# Patient Record
Sex: MALE | Race: BLACK OR AFRICAN AMERICAN | Employment: FULL TIME | URBAN - METROPOLITAN AREA
[De-identification: names, ages, dates, MRNs, and addresses within clinical notes are randomized per-mention and may not be internally consistent; named-entity substitution may affect disease eponyms.]

---

## 2017-03-14 ENCOUNTER — TELEPHONE (OUTPATIENT)
Dept: INTERNAL MEDICINE CLINIC | Age: 42
End: 2017-03-14

## 2017-03-14 DIAGNOSIS — Z13.9 SCREENING: Primary | ICD-10-CM

## 2017-04-24 ENCOUNTER — OFFICE VISIT (OUTPATIENT)
Dept: INTERNAL MEDICINE CLINIC | Age: 42
End: 2017-04-24

## 2017-04-24 VITALS
HEART RATE: 65 BPM | DIASTOLIC BLOOD PRESSURE: 78 MMHG | SYSTOLIC BLOOD PRESSURE: 118 MMHG | WEIGHT: 235 LBS | OXYGEN SATURATION: 98 % | BODY MASS INDEX: 27.16 KG/M2

## 2017-04-24 DIAGNOSIS — Z00.00 WELL ADULT EXAM: Primary | ICD-10-CM

## 2017-04-24 DIAGNOSIS — Z13.9 SCREENING: ICD-10-CM

## 2017-04-24 PROCEDURE — 99396 PREV VISIT EST AGE 40-64: CPT | Performed by: INTERNAL MEDICINE

## 2017-04-24 RX ORDER — FLUTICASONE PROPIONATE 50 MCG
2 SPRAY, SUSPENSION (ML) NASAL DAILY
Qty: 1 BOTTLE | Refills: 5 | Status: SHIPPED | OUTPATIENT
Start: 2017-04-24 | End: 2019-01-14

## 2017-05-11 ENCOUNTER — TELEPHONE (OUTPATIENT)
Dept: INTERNAL MEDICINE CLINIC | Age: 42
End: 2017-05-11

## 2017-06-30 PROBLEM — R07.1 CHEST PAIN VARYING WITH BREATHING: Status: ACTIVE | Noted: 2017-06-30

## 2017-06-30 PROBLEM — I26.99 BILATERAL PULMONARY EMBOLISM (HCC): Status: ACTIVE | Noted: 2017-06-30

## 2017-07-13 ENCOUNTER — OFFICE VISIT (OUTPATIENT)
Dept: INTERNAL MEDICINE CLINIC | Age: 42
End: 2017-07-13

## 2017-07-13 VITALS
DIASTOLIC BLOOD PRESSURE: 62 MMHG | SYSTOLIC BLOOD PRESSURE: 106 MMHG | OXYGEN SATURATION: 98 % | WEIGHT: 225 LBS | BODY MASS INDEX: 26 KG/M2 | HEART RATE: 64 BPM

## 2017-07-13 DIAGNOSIS — I26.99 BILATERAL PULMONARY EMBOLISM (HCC): Primary | ICD-10-CM

## 2017-07-13 PROCEDURE — 99213 OFFICE O/P EST LOW 20 MIN: CPT | Performed by: INTERNAL MEDICINE

## 2017-07-13 RX ORDER — OXYCODONE HYDROCHLORIDE AND ACETAMINOPHEN 5; 325 MG/1; MG/1
1 TABLET ORAL EVERY 4 HOURS PRN
COMMUNITY
End: 2017-12-02

## 2017-07-13 ASSESSMENT — ENCOUNTER SYMPTOMS
ANAL BLEEDING: 0
COUGH: 0
CONSTIPATION: 0
CHEST TIGHTNESS: 0
ABDOMINAL PAIN: 0
NAUSEA: 0
SINUS PRESSURE: 0
APNEA: 0
WHEEZING: 0
BLOOD IN STOOL: 0
VOICE CHANGE: 0
PHOTOPHOBIA: 0
RHINORRHEA: 0
DIARRHEA: 0
SORE THROAT: 0
ABDOMINAL DISTENTION: 0
EYE PAIN: 0
VOMITING: 0
BACK PAIN: 0
TROUBLE SWALLOWING: 0
EYE REDNESS: 0
EYE ITCHING: 0
FACIAL SWELLING: 0
COLOR CHANGE: 0
ALLERGIC/IMMUNOLOGIC NEGATIVE: 1
SHORTNESS OF BREATH: 1
STRIDOR: 0
EYE DISCHARGE: 0

## 2017-07-24 ENCOUNTER — TELEPHONE (OUTPATIENT)
Dept: INTERNAL MEDICINE CLINIC | Age: 42
End: 2017-07-24

## 2017-07-24 RX ORDER — TIZANIDINE 4 MG/1
4 TABLET ORAL 3 TIMES DAILY
Qty: 30 TABLET | Refills: 1 | Status: SHIPPED | OUTPATIENT
Start: 2017-07-24 | End: 2017-08-16 | Stop reason: ALTCHOICE

## 2017-07-31 PROBLEM — Z79.01 CHRONIC ANTICOAGULATION: Status: ACTIVE | Noted: 2017-07-31

## 2017-09-28 ENCOUNTER — TELEPHONE (OUTPATIENT)
Dept: INTERNAL MEDICINE CLINIC | Age: 42
End: 2017-09-28

## 2017-10-05 ENCOUNTER — OFFICE VISIT (OUTPATIENT)
Dept: INTERNAL MEDICINE CLINIC | Age: 42
End: 2017-10-05

## 2017-10-05 VITALS
SYSTOLIC BLOOD PRESSURE: 124 MMHG | BODY MASS INDEX: 26.23 KG/M2 | WEIGHT: 227 LBS | HEART RATE: 68 BPM | OXYGEN SATURATION: 98 % | DIASTOLIC BLOOD PRESSURE: 64 MMHG

## 2017-10-05 DIAGNOSIS — I82.532 CHRONIC DEEP VEIN THROMBOSIS (DVT) OF POPLITEAL VEIN OF LEFT LOWER EXTREMITY (HCC): Primary | ICD-10-CM

## 2017-10-05 DIAGNOSIS — G57.92 NEUROPATHY OF LEFT LOWER EXTREMITY: ICD-10-CM

## 2017-10-05 PROCEDURE — 99213 OFFICE O/P EST LOW 20 MIN: CPT | Performed by: INTERNAL MEDICINE

## 2017-10-10 ENCOUNTER — HOSPITAL ENCOUNTER (OUTPATIENT)
Dept: NEUROLOGY | Age: 42
Discharge: HOME | End: 2017-10-10
Attending: INTERNAL MEDICINE | Admitting: INTERNAL MEDICINE

## 2017-10-10 DIAGNOSIS — I82.532 CHRONIC EMBOLISM AND THROMBOSIS OF LEFT POPLITEAL VEIN (HCC): ICD-10-CM

## 2017-10-11 ENCOUNTER — HOSPITAL ENCOUNTER (OUTPATIENT)
Dept: OTHER | Age: 42
Discharge: HOME | End: 2017-10-11
Attending: INTERNAL MEDICINE | Admitting: INTERNAL MEDICINE

## 2017-12-01 ENCOUNTER — TELEPHONE (OUTPATIENT)
Dept: INTERNAL MEDICINE CLINIC | Age: 42
End: 2017-12-01

## 2017-12-01 NOTE — TELEPHONE ENCOUNTER
Patient calling in about mess he left in My Chart for Dr. Wells Double today. He almost went to the ER last night and wondering what he needs to do . Wants appt soon and was wondering if could be fitted in?  Please cb pt

## 2017-12-04 ENCOUNTER — TELEPHONE (OUTPATIENT)
Dept: INTERNAL MEDICINE CLINIC | Age: 42
End: 2017-12-04

## 2017-12-05 NOTE — TELEPHONE ENCOUNTER
Patient received pain medication from ED on 12/2/17. We cannot refill for 20 days based on [facility] 341.

## 2017-12-08 ENCOUNTER — OFFICE VISIT (OUTPATIENT)
Dept: INTERNAL MEDICINE CLINIC | Age: 42
End: 2017-12-08

## 2017-12-08 VITALS
WEIGHT: 227 LBS | DIASTOLIC BLOOD PRESSURE: 68 MMHG | BODY MASS INDEX: 26.23 KG/M2 | HEART RATE: 82 BPM | SYSTOLIC BLOOD PRESSURE: 110 MMHG | OXYGEN SATURATION: 98 %

## 2017-12-08 DIAGNOSIS — N44.2 BENIGN CYST OF BOTH TESTICLES: Primary | ICD-10-CM

## 2017-12-08 PROCEDURE — 99212 OFFICE O/P EST SF 10 MIN: CPT | Performed by: INTERNAL MEDICINE

## 2017-12-08 RX ORDER — OXYCODONE HYDROCHLORIDE AND ACETAMINOPHEN 5; 325 MG/1; MG/1
1 TABLET ORAL EVERY 6 HOURS PRN
Qty: 28 TABLET | Refills: 0 | Status: SHIPPED | OUTPATIENT
Start: 2017-12-08 | End: 2017-12-22

## 2017-12-08 NOTE — PATIENT INSTRUCTIONS
Patient Education        Learning About Diet for Kidney Stone Prevention  What are kidney stones? Kidney stones are made of salts and minerals in the urine that form small \"fred. \" Stones can form in the kidneys and the ureters (the tubes that lead from the kidneys to the bladder). They can also form in the bladder. Stones may not cause a problem as long as they stay in the kidneys. But they can cause sudden, severe pain. Pain is most likely when the stones travel from the kidneys to the bladder. Kidney stones can cause bloody urine. Kidney stones often run in families. You are more likely to get them if you don't drink enough fluids, mainly water. Certain foods and drinks and some dietary supplements may also increase your risk for kidney stones if you consume too much of them. What can you do to prevent kidney stones? Changing what you eat may not prevent all types of kidney stones. But for people who have a history of certain kinds of kidney stones, some changes in diet may help. A dietitian can help you set up a meal plan that includes healthy, low-oxalate choices. Here are some general guidelines to get you started. Plan your meals and snacks around foods that are low in oxalate. These foods include:  · Corn, kale, parsnips, and squash,. · Beef, chicken, pork, turkey, and fish. · Milk, butter, cheese, and yogurt. You can eat certain foods that are medium-high in oxalate, but eat them only once in a while. These foods include:  · Bread. · Brown rice. · English muffins. · Figs. · Popcorn. · String beans. · Tomatoes. Limit very high-oxalate foods, including:  · Black tea. · Coffee. · Chocolate. · Dark green vegetables. · Nuts. Here are some other things you can do to help prevent kidney stones. · Drink plenty of fluids. If you have kidney, heart, or liver disease and have to limit fluids, talk with your doctor before you increase the amount of fluids you drink.   · Do not take more than

## 2017-12-11 NOTE — PROGRESS NOTES
Subjective:      Patient ID: Reyes Joy is a 36 y.o. male. HPI  Patient comes in for f/u of a left testicular cyst. He is scheduled to go  To urology. He would like some pain medication. Review of Systems  Vitals:    12/08/17 1303   BP: 110/68   Pulse: 82   SpO2: 98%   Weight: 227 lb (103 kg)      Wt Readings from Last 3 Encounters:   12/08/17 227 lb (103 kg)   12/02/17 227 lb (103 kg)   10/05/17 227 lb (103 kg)     BP Readings from Last 3 Encounters:   12/08/17 110/68   12/02/17 124/75   10/05/17 124/64     Body mass index is 26.23 kg/m². Facility age limit for growth percentiles is 20 years. Objective:   Physical Exam    Testicular ultrasound: 9 mm testicular cyst    Assessment/Plan:  Tomy was seen today for follow-up from hospital.    Diagnoses and all orders for this visit:    Benign cyst of both testicles  -     oxyCODONE-acetaminophen (PERCOCET) 5-325 MG per tablet; Take 1 tablet by mouth every 6 hours as needed for Pain  Testicular cyst.      Return in about 1 month (around 1/8/2018).      Total time 10 minutes with > 50% counseling

## 2018-01-31 ENCOUNTER — TELEPHONE (OUTPATIENT)
Dept: INTERNAL MEDICINE CLINIC | Age: 43
End: 2018-01-31

## 2018-01-31 NOTE — LETTER
[address] [facility]  [address] [address]  Phone: [phone]  Fax: [phone]    Tigre Campbell MD        January 31, 2018     Patient: Jeffrey Burgess   YOB: 1975   Date of Visit: 1/31/2018       To Whom It May Concern:    Tomy Perez is under my care for bilateral testicular cysts. These are causing him significant pain and impair his ability to work. I recommend that he remain out of work until 2/15/18. By that time he will have seen his urologist for this. If you have any questions or concerns, please don't hesitate to call.     Sincerely,        Vanita Monroy MD, MSc, [address], [address] [place] [address] Internal Medicine-Pediatrics  [address] [address].

## 2018-04-16 ENCOUNTER — OFFICE VISIT (OUTPATIENT)
Dept: INTERNAL MEDICINE CLINIC | Age: 43
End: 2018-04-16

## 2018-04-16 VITALS
DIASTOLIC BLOOD PRESSURE: 78 MMHG | SYSTOLIC BLOOD PRESSURE: 126 MMHG | OXYGEN SATURATION: 98 % | HEART RATE: 64 BPM | WEIGHT: 234 LBS | BODY MASS INDEX: 27.04 KG/M2

## 2018-04-16 DIAGNOSIS — R41.840 INATTENTION: Primary | ICD-10-CM

## 2018-04-16 PROBLEM — Z79.01 CHRONIC ANTICOAGULATION: Status: RESOLVED | Noted: 2017-07-31 | Resolved: 2018-04-16

## 2018-04-16 PROCEDURE — 99213 OFFICE O/P EST LOW 20 MIN: CPT | Performed by: INTERNAL MEDICINE

## 2018-04-16 PROCEDURE — G8417 CALC BMI ABV UP PARAM F/U: HCPCS | Performed by: INTERNAL MEDICINE

## 2018-04-16 PROCEDURE — 1036F TOBACCO NON-USER: CPT | Performed by: INTERNAL MEDICINE

## 2018-04-16 PROCEDURE — G8427 DOCREV CUR MEDS BY ELIG CLIN: HCPCS | Performed by: INTERNAL MEDICINE

## 2018-05-01 ENCOUNTER — OFFICE VISIT (OUTPATIENT)
Dept: PSYCHOLOGY | Age: 43
End: 2018-05-01

## 2018-05-01 DIAGNOSIS — F33.1 MODERATE EPISODE OF RECURRENT MAJOR DEPRESSIVE DISORDER (HCC): Primary | ICD-10-CM

## 2018-05-01 DIAGNOSIS — F41.1 GAD (GENERALIZED ANXIETY DISORDER): ICD-10-CM

## 2018-05-01 PROCEDURE — 90791 PSYCH DIAGNOSTIC EVALUATION: CPT | Performed by: PSYCHOLOGIST

## 2018-05-01 ASSESSMENT — ANXIETY QUESTIONNAIRES
5. BEING SO RESTLESS THAT IT IS HARD TO SIT STILL: 1-SEVERAL DAYS
1. FEELING NERVOUS, ANXIOUS, OR ON EDGE: 2-OVER HALF THE DAYS
4. TROUBLE RELAXING: 3-NEARLY EVERY DAY
7. FEELING AFRAID AS IF SOMETHING AWFUL MIGHT HAPPEN: 0-NOT AT ALL SURE
2. NOT BEING ABLE TO STOP OR CONTROL WORRYING: 1-SEVERAL DAYS
GAD7 TOTAL SCORE: 12
3. WORRYING TOO MUCH ABOUT DIFFERENT THINGS: 2-OVER HALF THE DAYS
6. BECOMING EASILY ANNOYED OR IRRITABLE: 3-NEARLY EVERY DAY

## 2018-05-01 ASSESSMENT — PATIENT HEALTH QUESTIONNAIRE - PHQ9
6. FEELING BAD ABOUT YOURSELF - OR THAT YOU ARE A FAILURE OR HAVE LET YOURSELF OR YOUR FAMILY DOWN: 3
SUM OF ALL RESPONSES TO PHQ9 QUESTIONS 1 & 2: 3
2. FEELING DOWN, DEPRESSED OR HOPELESS: 1
1. LITTLE INTEREST OR PLEASURE IN DOING THINGS: 2
9. THOUGHTS THAT YOU WOULD BE BETTER OFF DEAD, OR OF HURTING YOURSELF: 1
8. MOVING OR SPEAKING SO SLOWLY THAT OTHER PEOPLE COULD HAVE NOTICED. OR THE OPPOSITE, BEING SO FIGETY OR RESTLESS THAT YOU HAVE BEEN MOVING AROUND A LOT MORE THAN USUAL: 2
10. IF YOU CHECKED OFF ANY PROBLEMS, HOW DIFFICULT HAVE THESE PROBLEMS MADE IT FOR YOU TO DO YOUR WORK, TAKE CARE OF THINGS AT HOME, OR GET ALONG WITH OTHER PEOPLE: 2
4. FEELING TIRED OR HAVING LITTLE ENERGY: 1
SUM OF ALL RESPONSES TO PHQ QUESTIONS 1-9: 18
3. TROUBLE FALLING OR STAYING ASLEEP: 3
5. POOR APPETITE OR OVEREATING: 3
7. TROUBLE CONCENTRATING ON THINGS, SUCH AS READING THE NEWSPAPER OR WATCHING TELEVISION: 2

## 2018-05-30 ENCOUNTER — TELEPHONE (OUTPATIENT)
Dept: INTERNAL MEDICINE CLINIC | Age: 43
End: 2018-05-30

## 2018-06-08 ENCOUNTER — OFFICE VISIT (OUTPATIENT)
Dept: INTERNAL MEDICINE CLINIC | Age: 43
End: 2018-06-08

## 2018-06-08 VITALS
SYSTOLIC BLOOD PRESSURE: 100 MMHG | OXYGEN SATURATION: 98 % | BODY MASS INDEX: 26 KG/M2 | DIASTOLIC BLOOD PRESSURE: 70 MMHG | WEIGHT: 225 LBS | HEART RATE: 72 BPM

## 2018-06-08 DIAGNOSIS — I26.99 PULMONARY INFARCT (HCC): Primary | ICD-10-CM

## 2018-06-08 DIAGNOSIS — I26.99 BILATERAL PULMONARY EMBOLISM (HCC): ICD-10-CM

## 2018-06-08 PROCEDURE — G8417 CALC BMI ABV UP PARAM F/U: HCPCS | Performed by: INTERNAL MEDICINE

## 2018-06-08 PROCEDURE — 1036F TOBACCO NON-USER: CPT | Performed by: INTERNAL MEDICINE

## 2018-06-08 PROCEDURE — G8427 DOCREV CUR MEDS BY ELIG CLIN: HCPCS | Performed by: INTERNAL MEDICINE

## 2018-06-08 PROCEDURE — 99214 OFFICE O/P EST MOD 30 MIN: CPT | Performed by: INTERNAL MEDICINE

## 2018-06-08 RX ORDER — METHOCARBAMOL 750 MG/1
750 TABLET, FILM COATED ORAL 4 TIMES DAILY
COMMUNITY
End: 2018-06-10 | Stop reason: SDUPTHER

## 2018-06-08 RX ORDER — PREDNISONE 20 MG/1
20 TABLET ORAL DAILY
COMMUNITY
End: 2019-01-14 | Stop reason: SINTOL

## 2018-06-08 RX ORDER — OXYCODONE HYDROCHLORIDE AND ACETAMINOPHEN 5; 325 MG/1; MG/1
1 TABLET ORAL EVERY 8 HOURS PRN
COMMUNITY
End: 2018-06-08 | Stop reason: SDUPTHER

## 2018-06-08 RX ORDER — OXYCODONE HYDROCHLORIDE AND ACETAMINOPHEN 5; 325 MG/1; MG/1
1 TABLET ORAL EVERY 12 HOURS PRN
Qty: 60 TABLET | Refills: 0 | Status: SHIPPED | OUTPATIENT
Start: 2018-06-08 | End: 2018-07-10 | Stop reason: SDUPTHER

## 2018-06-09 ENCOUNTER — PATIENT MESSAGE (OUTPATIENT)
Dept: INTERNAL MEDICINE CLINIC | Age: 43
End: 2018-06-09

## 2018-06-10 ENCOUNTER — PATIENT MESSAGE (OUTPATIENT)
Dept: INTERNAL MEDICINE CLINIC | Age: 43
End: 2018-06-10

## 2018-06-10 RX ORDER — METHOCARBAMOL 750 MG/1
750 TABLET, FILM COATED ORAL 4 TIMES DAILY PRN
Qty: 90 TABLET | Refills: 2 | Status: SHIPPED | OUTPATIENT
Start: 2018-06-10 | End: 2019-01-14 | Stop reason: SINTOL

## 2018-06-10 ASSESSMENT — ENCOUNTER SYMPTOMS
CHOKING: 0
SHORTNESS OF BREATH: 0
CHEST TIGHTNESS: 1
WHEEZING: 0

## 2018-06-12 ENCOUNTER — PATIENT MESSAGE (OUTPATIENT)
Dept: INTERNAL MEDICINE CLINIC | Age: 43
End: 2018-06-12

## 2018-06-13 ENCOUNTER — OFFICE VISIT (OUTPATIENT)
Dept: INTERNAL MEDICINE CLINIC | Age: 43
End: 2018-06-13

## 2018-06-13 VITALS
OXYGEN SATURATION: 97 % | SYSTOLIC BLOOD PRESSURE: 120 MMHG | WEIGHT: 228 LBS | DIASTOLIC BLOOD PRESSURE: 80 MMHG | BODY MASS INDEX: 26.35 KG/M2 | HEART RATE: 64 BPM

## 2018-06-13 DIAGNOSIS — I26.99 PULMONARY INFARCT (HCC): ICD-10-CM

## 2018-06-13 DIAGNOSIS — F32.1 MODERATE SINGLE CURRENT EPISODE OF MAJOR DEPRESSIVE DISORDER (HCC): Primary | ICD-10-CM

## 2018-06-13 PROCEDURE — G8427 DOCREV CUR MEDS BY ELIG CLIN: HCPCS | Performed by: INTERNAL MEDICINE

## 2018-06-13 PROCEDURE — 99214 OFFICE O/P EST MOD 30 MIN: CPT | Performed by: INTERNAL MEDICINE

## 2018-06-13 PROCEDURE — G8417 CALC BMI ABV UP PARAM F/U: HCPCS | Performed by: INTERNAL MEDICINE

## 2018-06-13 PROCEDURE — 1036F TOBACCO NON-USER: CPT | Performed by: INTERNAL MEDICINE

## 2018-06-13 RX ORDER — CITALOPRAM 20 MG/1
20 TABLET ORAL DAILY
Qty: 30 TABLET | Refills: 5 | Status: SHIPPED | OUTPATIENT
Start: 2018-06-13 | End: 2019-02-10 | Stop reason: SDUPTHER

## 2018-06-13 ASSESSMENT — PATIENT HEALTH QUESTIONNAIRE - PHQ9
6. FEELING BAD ABOUT YOURSELF - OR THAT YOU ARE A FAILURE OR HAVE LET YOURSELF OR YOUR FAMILY DOWN: 1
5. POOR APPETITE OR OVEREATING: 2
3. TROUBLE FALLING OR STAYING ASLEEP: 3
8. MOVING OR SPEAKING SO SLOWLY THAT OTHER PEOPLE COULD HAVE NOTICED. OR THE OPPOSITE, BEING SO FIGETY OR RESTLESS THAT YOU HAVE BEEN MOVING AROUND A LOT MORE THAN USUAL: 1
1. LITTLE INTEREST OR PLEASURE IN DOING THINGS: 2
7. TROUBLE CONCENTRATING ON THINGS, SUCH AS READING THE NEWSPAPER OR WATCHING TELEVISION: 1
SUM OF ALL RESPONSES TO PHQ9 QUESTIONS 1 & 2: 3
2. FEELING DOWN, DEPRESSED OR HOPELESS: 1
10. IF YOU CHECKED OFF ANY PROBLEMS, HOW DIFFICULT HAVE THESE PROBLEMS MADE IT FOR YOU TO DO YOUR WORK, TAKE CARE OF THINGS AT HOME, OR GET ALONG WITH OTHER PEOPLE: 1
4. FEELING TIRED OR HAVING LITTLE ENERGY: 1
SUM OF ALL RESPONSES TO PHQ QUESTIONS 1-9: 13
9. THOUGHTS THAT YOU WOULD BE BETTER OFF DEAD, OR OF HURTING YOURSELF: 1

## 2018-06-14 ENCOUNTER — HOSPITAL ENCOUNTER (OUTPATIENT)
Dept: OTHER | Age: 43
Discharge: HOME | End: 2018-06-14
Attending: INTERNAL MEDICINE | Admitting: INTERNAL MEDICINE

## 2018-06-14 ENCOUNTER — OFFICE VISIT (OUTPATIENT)
Dept: PULMONOLOGY | Age: 43
End: 2018-06-14

## 2018-06-14 VITALS
SYSTOLIC BLOOD PRESSURE: 110 MMHG | HEIGHT: 78 IN | BODY MASS INDEX: 26.27 KG/M2 | RESPIRATION RATE: 18 BRPM | OXYGEN SATURATION: 97 % | WEIGHT: 227 LBS | DIASTOLIC BLOOD PRESSURE: 70 MMHG | HEART RATE: 70 BPM

## 2018-06-14 DIAGNOSIS — R06.02 SHORTNESS OF BREATH: ICD-10-CM

## 2018-06-14 DIAGNOSIS — I26.99 PULMONARY EMBOLISM WITH INFARCTION (HCC): Primary | ICD-10-CM

## 2018-06-14 DIAGNOSIS — R07.81 PLEURITIC CHEST PAIN: ICD-10-CM

## 2018-06-14 DIAGNOSIS — I26.99 PULMONARY EMBOLISM WITH INFARCTION (HCC): ICD-10-CM

## 2018-06-14 PROCEDURE — 99244 OFF/OP CNSLTJ NEW/EST MOD 40: CPT | Performed by: INTERNAL MEDICINE

## 2018-06-14 PROCEDURE — G8417 CALC BMI ABV UP PARAM F/U: HCPCS | Performed by: INTERNAL MEDICINE

## 2018-06-14 PROCEDURE — G8427 DOCREV CUR MEDS BY ELIG CLIN: HCPCS | Performed by: INTERNAL MEDICINE

## 2018-06-15 LAB
ANA INTERPRETATION: ABNORMAL
ANA TITER: ABNORMAL
ANTI-NUCLEAR ANTIBODY (ANA): POSITIVE
ENA TO RNP ANTIBODY: NEGATIVE EU
ENA TO SMITH (SM) ANTIBODY: NEGATIVE EU
ENA TO SSA (RO) ANTIBODY: NEGATIVE EU
ENA TO SSB (LA) ANTIBODY: NEGATIVE EU

## 2018-06-16 LAB
ANCA IFA: NORMAL
DOUBLE STRANDED DNA AB, IGG: NORMAL
SCLERODERMA (SCL-70) AB: 1 AU/ML (ref 0–40)

## 2018-06-22 ENCOUNTER — TELEPHONE (OUTPATIENT)
Dept: INTERNAL MEDICINE CLINIC | Age: 43
End: 2018-06-22

## 2018-06-25 ENCOUNTER — HOSPITAL ENCOUNTER (OUTPATIENT)
Dept: NON INVASIVE DIAGNOSTICS | Age: 43
Discharge: HOME | End: 2018-06-25
Attending: INTERNAL MEDICINE | Admitting: INTERNAL MEDICINE

## 2018-06-25 DIAGNOSIS — R06.02 SHORTNESS OF BREATH: ICD-10-CM

## 2018-06-25 LAB
LV EF: 53 %
LVEF MODALITY: NORMAL

## 2018-07-10 ENCOUNTER — OFFICE VISIT (OUTPATIENT)
Dept: INTERNAL MEDICINE CLINIC | Age: 43
End: 2018-07-10

## 2018-07-10 VITALS
HEART RATE: 65 BPM | SYSTOLIC BLOOD PRESSURE: 110 MMHG | OXYGEN SATURATION: 96 % | BODY MASS INDEX: 26.46 KG/M2 | WEIGHT: 229 LBS | DIASTOLIC BLOOD PRESSURE: 70 MMHG

## 2018-07-10 DIAGNOSIS — I26.99 PULMONARY INFARCT (HCC): ICD-10-CM

## 2018-07-10 DIAGNOSIS — F33.1 MODERATE EPISODE OF RECURRENT MAJOR DEPRESSIVE DISORDER (HCC): Primary | ICD-10-CM

## 2018-07-10 PROCEDURE — 99213 OFFICE O/P EST LOW 20 MIN: CPT | Performed by: INTERNAL MEDICINE

## 2018-07-10 PROCEDURE — G8427 DOCREV CUR MEDS BY ELIG CLIN: HCPCS | Performed by: INTERNAL MEDICINE

## 2018-07-10 PROCEDURE — 1036F TOBACCO NON-USER: CPT | Performed by: INTERNAL MEDICINE

## 2018-07-10 PROCEDURE — G8417 CALC BMI ABV UP PARAM F/U: HCPCS | Performed by: INTERNAL MEDICINE

## 2018-07-10 RX ORDER — OXYCODONE HYDROCHLORIDE AND ACETAMINOPHEN 5; 325 MG/1; MG/1
1 TABLET ORAL EVERY 12 HOURS PRN
Qty: 60 TABLET | Refills: 0 | Status: SHIPPED | OUTPATIENT
Start: 2018-07-10 | End: 2018-08-09

## 2018-07-10 RX ORDER — ARIPIPRAZOLE 5 MG/1
5 TABLET ORAL DAILY
Qty: 30 TABLET | Refills: 3 | Status: SHIPPED | OUTPATIENT
Start: 2018-07-10 | End: 2019-01-14 | Stop reason: SINTOL

## 2018-07-10 ASSESSMENT — PATIENT HEALTH QUESTIONNAIRE - PHQ9
2. FEELING DOWN, DEPRESSED OR HOPELESS: 1
1. LITTLE INTEREST OR PLEASURE IN DOING THINGS: 1
3. TROUBLE FALLING OR STAYING ASLEEP: 3
6. FEELING BAD ABOUT YOURSELF - OR THAT YOU ARE A FAILURE OR HAVE LET YOURSELF OR YOUR FAMILY DOWN: 2
8. MOVING OR SPEAKING SO SLOWLY THAT OTHER PEOPLE COULD HAVE NOTICED. OR THE OPPOSITE, BEING SO FIGETY OR RESTLESS THAT YOU HAVE BEEN MOVING AROUND A LOT MORE THAN USUAL: 0
4. FEELING TIRED OR HAVING LITTLE ENERGY: 2
5. POOR APPETITE OR OVEREATING: 1
9. THOUGHTS THAT YOU WOULD BE BETTER OFF DEAD, OR OF HURTING YOURSELF: 1
SUM OF ALL RESPONSES TO PHQ9 QUESTIONS 1 & 2: 2
SUM OF ALL RESPONSES TO PHQ QUESTIONS 1-9: 12
10. IF YOU CHECKED OFF ANY PROBLEMS, HOW DIFFICULT HAVE THESE PROBLEMS MADE IT FOR YOU TO DO YOUR WORK, TAKE CARE OF THINGS AT HOME, OR GET ALONG WITH OTHER PEOPLE: 1
7. TROUBLE CONCENTRATING ON THINGS, SUCH AS READING THE NEWSPAPER OR WATCHING TELEVISION: 1

## 2019-01-14 ENCOUNTER — OFFICE VISIT (OUTPATIENT)
Dept: INTERNAL MEDICINE CLINIC | Age: 44
End: 2019-01-14
Payer: COMMERCIAL

## 2019-01-14 VITALS
HEART RATE: 81 BPM | DIASTOLIC BLOOD PRESSURE: 70 MMHG | SYSTOLIC BLOOD PRESSURE: 128 MMHG | BODY MASS INDEX: 28.2 KG/M2 | WEIGHT: 244 LBS | OXYGEN SATURATION: 96 %

## 2019-01-14 DIAGNOSIS — R06.02 SHORTNESS OF BREATH: Primary | ICD-10-CM

## 2019-01-14 DIAGNOSIS — F51.01 PRIMARY INSOMNIA: ICD-10-CM

## 2019-01-14 DIAGNOSIS — I26.99 PULMONARY INFARCT (HCC): ICD-10-CM

## 2019-01-14 PROCEDURE — 99214 OFFICE O/P EST MOD 30 MIN: CPT | Performed by: INTERNAL MEDICINE

## 2019-01-14 PROCEDURE — G8417 CALC BMI ABV UP PARAM F/U: HCPCS | Performed by: INTERNAL MEDICINE

## 2019-01-14 PROCEDURE — G8484 FLU IMMUNIZE NO ADMIN: HCPCS | Performed by: INTERNAL MEDICINE

## 2019-01-14 PROCEDURE — 1036F TOBACCO NON-USER: CPT | Performed by: INTERNAL MEDICINE

## 2019-01-14 PROCEDURE — G8427 DOCREV CUR MEDS BY ELIG CLIN: HCPCS | Performed by: INTERNAL MEDICINE

## 2019-01-14 RX ORDER — VALACYCLOVIR HYDROCHLORIDE 1 G/1
1000 TABLET, FILM COATED ORAL DAILY
Qty: 30 TABLET | Refills: 11 | Status: SHIPPED | OUTPATIENT
Start: 2019-01-14 | End: 2020-01-20

## 2019-01-14 RX ORDER — CIPROFLOXACIN AND DEXAMETHASONE 3; 1 MG/ML; MG/ML
4 SUSPENSION/ DROPS AURICULAR (OTIC) 2 TIMES DAILY
Qty: 7.5 ML | Refills: 0 | Status: SHIPPED | OUTPATIENT
Start: 2019-01-14 | End: 2019-01-21

## 2019-01-16 ASSESSMENT — ENCOUNTER SYMPTOMS
WHEEZING: 1
SHORTNESS OF BREATH: 1
EYE PAIN: 0

## 2019-01-22 ENCOUNTER — HOSPITAL ENCOUNTER (OUTPATIENT)
Dept: PULMONOLOGY | Age: 44
Discharge: HOME | End: 2019-01-22
Payer: COMMERCIAL

## 2019-01-22 PROCEDURE — 94761 N-INVAS EAR/PLS OXIMETRY MLT: CPT

## 2019-01-22 PROCEDURE — 94664 DEMO&/EVAL PT USE INHALER: CPT

## 2019-01-22 PROCEDURE — 94729 DIFFUSING CAPACITY: CPT

## 2019-01-22 PROCEDURE — 6360000002 HC RX W HCPCS: Performed by: INTERNAL MEDICINE

## 2019-01-22 PROCEDURE — 94640 AIRWAY INHALATION TREATMENT: CPT

## 2019-01-22 PROCEDURE — 94726 PLETHYSMOGRAPHY LUNG VOLUMES: CPT

## 2019-01-22 PROCEDURE — 94060 EVALUATION OF WHEEZING: CPT

## 2019-01-22 RX ORDER — ALBUTEROL SULFATE 2.5 MG/3ML
2.5 SOLUTION RESPIRATORY (INHALATION) ONCE
Status: COMPLETED | OUTPATIENT
Start: 2019-01-22 | End: 2019-01-22

## 2019-01-22 RX ADMIN — ALBUTEROL SULFATE 2.5 MG: 2.5 SOLUTION RESPIRATORY (INHALATION) at 16:16

## 2019-02-04 ENCOUNTER — OFFICE VISIT (OUTPATIENT)
Dept: INTERNAL MEDICINE CLINIC | Age: 44
End: 2019-02-04
Payer: COMMERCIAL

## 2019-02-04 VITALS
SYSTOLIC BLOOD PRESSURE: 118 MMHG | BODY MASS INDEX: 28.54 KG/M2 | WEIGHT: 247 LBS | HEART RATE: 83 BPM | OXYGEN SATURATION: 96 % | DIASTOLIC BLOOD PRESSURE: 70 MMHG

## 2019-02-04 DIAGNOSIS — J45.32 MILD PERSISTENT ASTHMA WITH STATUS ASTHMATICUS: Primary | ICD-10-CM

## 2019-02-04 PROCEDURE — G8484 FLU IMMUNIZE NO ADMIN: HCPCS | Performed by: INTERNAL MEDICINE

## 2019-02-04 PROCEDURE — G8427 DOCREV CUR MEDS BY ELIG CLIN: HCPCS | Performed by: INTERNAL MEDICINE

## 2019-02-04 PROCEDURE — 1036F TOBACCO NON-USER: CPT | Performed by: INTERNAL MEDICINE

## 2019-02-04 PROCEDURE — 99214 OFFICE O/P EST MOD 30 MIN: CPT | Performed by: INTERNAL MEDICINE

## 2019-02-04 PROCEDURE — G8417 CALC BMI ABV UP PARAM F/U: HCPCS | Performed by: INTERNAL MEDICINE

## 2019-02-04 RX ORDER — ALBUTEROL SULFATE 90 UG/1
2 AEROSOL, METERED RESPIRATORY (INHALATION) EVERY 6 HOURS PRN
Qty: 1 INHALER | Refills: 3 | Status: SHIPPED | OUTPATIENT
Start: 2019-02-04 | End: 2020-10-29

## 2019-02-04 RX ORDER — FLUTICASONE FUROATE AND VILANTEROL 100; 25 UG/1; UG/1
1 POWDER RESPIRATORY (INHALATION) DAILY
Qty: 60 EACH | Refills: 5 | Status: SHIPPED | OUTPATIENT
Start: 2019-02-04 | End: 2019-04-03 | Stop reason: SDUPTHER

## 2019-02-04 RX ORDER — FLUTICASONE FUROATE AND VILANTEROL 100; 25 UG/1; UG/1
1 POWDER RESPIRATORY (INHALATION) DAILY
Qty: 14 EACH | Refills: 0 | COMMUNITY
Start: 2019-02-04 | End: 2019-04-03

## 2019-02-05 ASSESSMENT — ENCOUNTER SYMPTOMS
WHEEZING: 1
SHORTNESS OF BREATH: 1

## 2019-02-10 DIAGNOSIS — F32.1 MODERATE SINGLE CURRENT EPISODE OF MAJOR DEPRESSIVE DISORDER (HCC): ICD-10-CM

## 2019-02-11 RX ORDER — APIXABAN 5 MG/1
TABLET, FILM COATED ORAL
Qty: 60 TABLET | Refills: 0 | Status: SHIPPED | OUTPATIENT
Start: 2019-02-11 | End: 2019-04-06 | Stop reason: SDUPTHER

## 2019-02-11 RX ORDER — CITALOPRAM 20 MG/1
20 TABLET ORAL DAILY
Qty: 30 TABLET | Refills: 0 | Status: SHIPPED | OUTPATIENT
Start: 2019-02-11 | End: 2019-04-06 | Stop reason: SDUPTHER

## 2019-02-25 ENCOUNTER — APPOINTMENT (OUTPATIENT)
Dept: CT IMAGING | Age: 44
End: 2019-02-25
Payer: COMMERCIAL

## 2019-02-25 ENCOUNTER — HOSPITAL ENCOUNTER (EMERGENCY)
Age: 44
Discharge: HOME | End: 2019-02-26
Attending: EMERGENCY MEDICINE
Payer: COMMERCIAL

## 2019-02-25 ENCOUNTER — APPOINTMENT (OUTPATIENT)
Dept: GENERAL RADIOLOGY | Age: 44
End: 2019-02-25
Payer: COMMERCIAL

## 2019-02-25 VITALS
RESPIRATION RATE: 20 BRPM | SYSTOLIC BLOOD PRESSURE: 131 MMHG | HEART RATE: 56 BPM | BODY MASS INDEX: 28.69 KG/M2 | TEMPERATURE: 98.4 F | OXYGEN SATURATION: 97 % | WEIGHT: 248 LBS | DIASTOLIC BLOOD PRESSURE: 87 MMHG | HEIGHT: 78 IN

## 2019-02-25 DIAGNOSIS — J18.9 PNEUMONIA DUE TO ORGANISM: ICD-10-CM

## 2019-02-25 DIAGNOSIS — Z79.01 ON CONTINUOUS ORAL ANTICOAGULATION: ICD-10-CM

## 2019-02-25 DIAGNOSIS — R06.02 SHORTNESS OF BREATH: Primary | ICD-10-CM

## 2019-02-25 DIAGNOSIS — Z86.711 HX OF PULMONARY EMBOLUS: ICD-10-CM

## 2019-02-25 LAB
A/G RATIO: 1.4 (ref 1.1–2.2)
ALBUMIN SERPL-MCNC: 4.3 G/DL (ref 3.4–5)
ALP BLD-CCNC: 54 U/L (ref 40–129)
ALT SERPL-CCNC: 26 U/L (ref 10–40)
ANION GAP SERPL CALCULATED.3IONS-SCNC: 11 MMOL/L (ref 3–16)
AST SERPL-CCNC: 30 U/L (ref 15–37)
BASOPHILS ABSOLUTE: 0 K/UL (ref 0–0.2)
BASOPHILS RELATIVE PERCENT: 1.1 %
BILIRUB SERPL-MCNC: 0.3 MG/DL (ref 0–1)
BUN BLDV-MCNC: 14 MG/DL (ref 7–20)
CALCIUM SERPL-MCNC: 9.4 MG/DL (ref 8.3–10.6)
CHLORIDE BLD-SCNC: 100 MMOL/L (ref 99–110)
CO2: 28 MMOL/L (ref 21–32)
CREAT SERPL-MCNC: 1.2 MG/DL (ref 0.9–1.3)
D DIMER: 389 NG/ML DDU (ref 0–229)
EOSINOPHILS ABSOLUTE: 0.1 K/UL (ref 0–0.6)
EOSINOPHILS RELATIVE PERCENT: 2.6 %
GFR AFRICAN AMERICAN: >60
GFR NON-AFRICAN AMERICAN: >60
GLOBULIN: 3.1 G/DL
GLUCOSE BLD-MCNC: 135 MG/DL (ref 70–99)
HCT VFR BLD CALC: 44 % (ref 40.5–52.5)
HEMOGLOBIN: 14.9 G/DL (ref 13.5–17.5)
LYMPHOCYTES ABSOLUTE: 1.9 K/UL (ref 1–5.1)
LYMPHOCYTES RELATIVE PERCENT: 51.4 %
MCH RBC QN AUTO: 29.6 PG (ref 26–34)
MCHC RBC AUTO-ENTMCNC: 33.9 G/DL (ref 31–36)
MCV RBC AUTO: 87.4 FL (ref 80–100)
MONOCYTES ABSOLUTE: 0.3 K/UL (ref 0–1.3)
MONOCYTES RELATIVE PERCENT: 6.7 %
NEUTROPHILS ABSOLUTE: 1.4 K/UL (ref 1.7–7.7)
NEUTROPHILS RELATIVE PERCENT: 38.2 %
PDW BLD-RTO: 14.1 % (ref 12.4–15.4)
PLATELET # BLD: 132 K/UL (ref 135–450)
PMV BLD AUTO: 8.5 FL (ref 5–10.5)
POTASSIUM SERPL-SCNC: 3.9 MMOL/L (ref 3.5–5.1)
RAPID INFLUENZA  B AGN: NEGATIVE
RAPID INFLUENZA A AGN: NEGATIVE
RBC # BLD: 5.03 M/UL (ref 4.2–5.9)
SODIUM BLD-SCNC: 139 MMOL/L (ref 136–145)
TOTAL PROTEIN: 7.4 G/DL (ref 6.4–8.2)
TROPONIN: <0.01 NG/ML
TROPONIN: <0.01 NG/ML
WBC # BLD: 3.7 K/UL (ref 4–11)

## 2019-02-25 PROCEDURE — 87804 INFLUENZA ASSAY W/OPTIC: CPT

## 2019-02-25 PROCEDURE — 85379 FIBRIN DEGRADATION QUANT: CPT

## 2019-02-25 PROCEDURE — 71046 X-RAY EXAM CHEST 2 VIEWS: CPT

## 2019-02-25 PROCEDURE — 93005 ELECTROCARDIOGRAM TRACING: CPT | Performed by: EMERGENCY MEDICINE

## 2019-02-25 PROCEDURE — 6370000000 HC RX 637 (ALT 250 FOR IP): Performed by: EMERGENCY MEDICINE

## 2019-02-25 PROCEDURE — 6360000004 HC RX CONTRAST MEDICATION: Performed by: EMERGENCY MEDICINE

## 2019-02-25 PROCEDURE — 84484 ASSAY OF TROPONIN QUANT: CPT

## 2019-02-25 PROCEDURE — 85025 COMPLETE CBC W/AUTO DIFF WBC: CPT

## 2019-02-25 PROCEDURE — 80053 COMPREHEN METABOLIC PANEL: CPT

## 2019-02-25 PROCEDURE — 99284 EMERGENCY DEPT VISIT MOD MDM: CPT

## 2019-02-25 PROCEDURE — 71275 CT ANGIOGRAPHY CHEST: CPT

## 2019-02-25 PROCEDURE — 36415 COLL VENOUS BLD VENIPUNCTURE: CPT

## 2019-02-25 RX ORDER — AMOXICILLIN AND CLAVULANATE POTASSIUM 875; 125 MG/1; MG/1
1 TABLET, FILM COATED ORAL 2 TIMES DAILY
Qty: 20 TABLET | Refills: 0 | Status: SHIPPED | OUTPATIENT
Start: 2019-02-25 | End: 2019-03-07

## 2019-02-25 RX ORDER — AMOXICILLIN AND CLAVULANATE POTASSIUM 875; 125 MG/1; MG/1
1 TABLET, FILM COATED ORAL ONCE
Status: COMPLETED | OUTPATIENT
Start: 2019-02-25 | End: 2019-02-25

## 2019-02-25 RX ADMIN — AMOXICILLIN AND CLAVULANATE POTASSIUM 1 TABLET: 875; 125 TABLET, FILM COATED ORAL at 23:31

## 2019-02-25 RX ADMIN — IOPAMIDOL 80 ML: 755 INJECTION, SOLUTION INTRAVENOUS at 21:32

## 2019-02-25 ASSESSMENT — PAIN DESCRIPTION - PAIN TYPE
TYPE: ACUTE PAIN
TYPE: ACUTE PAIN

## 2019-02-25 ASSESSMENT — PAIN DESCRIPTION - DESCRIPTORS: DESCRIPTORS: PRESSURE

## 2019-02-25 ASSESSMENT — PAIN SCALES - GENERAL
PAINLEVEL_OUTOF10: 8
PAINLEVEL_OUTOF10: 5

## 2019-02-25 ASSESSMENT — PAIN DESCRIPTION - ORIENTATION
ORIENTATION: RIGHT
ORIENTATION: MID

## 2019-02-25 ASSESSMENT — PAIN DESCRIPTION - LOCATION
LOCATION: CHEST
LOCATION: HAND

## 2019-02-26 LAB
EKG ATRIAL RATE: 74 BPM
EKG DIAGNOSIS: NORMAL
EKG P AXIS: 55 DEGREES
EKG P-R INTERVAL: 150 MS
EKG Q-T INTERVAL: 356 MS
EKG QRS DURATION: 82 MS
EKG QTC CALCULATION (BAZETT): 395 MS
EKG R AXIS: 14 DEGREES
EKG T AXIS: 53 DEGREES
EKG VENTRICULAR RATE: 74 BPM

## 2019-02-26 PROCEDURE — 93010 ELECTROCARDIOGRAM REPORT: CPT | Performed by: INTERNAL MEDICINE

## 2019-04-03 ENCOUNTER — OFFICE VISIT (OUTPATIENT)
Dept: INTERNAL MEDICINE CLINIC | Age: 44
End: 2019-04-03
Payer: COMMERCIAL

## 2019-04-03 VITALS
DIASTOLIC BLOOD PRESSURE: 68 MMHG | OXYGEN SATURATION: 96 % | WEIGHT: 250 LBS | BODY MASS INDEX: 28.89 KG/M2 | HEART RATE: 61 BPM | SYSTOLIC BLOOD PRESSURE: 108 MMHG

## 2019-04-03 DIAGNOSIS — J45.32 MILD PERSISTENT ASTHMA WITH STATUS ASTHMATICUS: ICD-10-CM

## 2019-04-03 PROCEDURE — 1036F TOBACCO NON-USER: CPT | Performed by: INTERNAL MEDICINE

## 2019-04-03 PROCEDURE — 99214 OFFICE O/P EST MOD 30 MIN: CPT | Performed by: INTERNAL MEDICINE

## 2019-04-03 PROCEDURE — G8427 DOCREV CUR MEDS BY ELIG CLIN: HCPCS | Performed by: INTERNAL MEDICINE

## 2019-04-03 PROCEDURE — G8417 CALC BMI ABV UP PARAM F/U: HCPCS | Performed by: INTERNAL MEDICINE

## 2019-04-03 RX ORDER — FLUTICASONE FUROATE AND VILANTEROL 100; 25 UG/1; UG/1
1 POWDER RESPIRATORY (INHALATION) DAILY
Qty: 60 EACH | Refills: 11 | Status: SHIPPED | OUTPATIENT
Start: 2019-04-03 | End: 2020-10-29

## 2019-04-03 ASSESSMENT — ASTHMA QUESTIONNAIRES
QUESTION_2 LAST FOUR WEEKS HOW OFTEN HAVE YOU HAD SHORTNESS OF BREATH: 3
ACT_TOTALSCORE: 18
QUESTION_1 LAST FOUR WEEKS HOW MUCH OF THE TIME DID YOUR ASTHMA KEEP YOU FROM GETTING AS MUCH DONE AT WORK, SCHOOL OR AT HOME: 3
QUESTION_5 LAST FOUR WEEKS HOW WOULD YOU RATE YOUR ASTHMA CONTROL: 4
QUESTION_3 LAST FOUR WEEKS HOW OFTEN DID YOUR ASTHMA SYMPTOMS (WHEEZING, COUGHING, SHORTNESS OF BREATH, CHEST TIGHTNESS OR PAIN) WAKE YOU UP AT NIGHT OR EARLIER THAN USUAL IN THE MORNING: 5
QUESTION_4 LAST FOUR WEEKS HOW OFTEN HAVE YOU USED YOUR RESCUE INHALER OR NEBULIZER MEDICATION (SUCH AS ALBUTEROL): 3

## 2019-04-03 NOTE — PROGRESS NOTES
4/3/2019     Tomy Perez (:  1975) is a 39 y.o. male, here for evaluation of the following medical concerns:    HPI  Asthma:  Current treatment includes combination beta agonists/steroid inhalers. Using preventive medication(s) consistently: no- using it intermittently. Residual symptoms: chest tightness and non-productive cough. Patient denies any other symptoms. He requires his rescue inhaler 2 time(s) per week. Review of Systems   Constitutional: Negative for diaphoresis and fatigue. Eyes: Negative for pain and redness. Respiratory: Negative for cough and choking. Prior to Visit Medications    Medication Sig Taking? Authorizing Provider   fluticasone-vilanterol (BREO ELLIPTA) 100-25 MCG/INH AEPB inhaler Inhale 1 puff into the lungs daily Yes Loren Leal MD   citalopram (CELEXA) 20 MG tablet TAKE 1 TABLET BY MOUTH DAILY Yes Loren Leal MD   ELIQUIS 5 MG TABS tablet TAKE 1 TABLET BY MOUTH TWICE DAILY Yes Loren Leal MD   albuterol sulfate HFA (VENTOLIN HFA) 108 (90 Base) MCG/ACT inhaler Inhale 2 puffs into the lungs every 6 hours as needed for Wheezing Yes Loren Leal MD   Suvorexant (BELSOMRA) 10 MG TABS Take 10 mg by mouth daily for 90 days. Alfa Chen MD   valACYclovir Morena Floro) 1 g tablet Take 1 tablet by mouth daily Yes Loren Leal MD        Social History     Tobacco Use    Smoking status: Former Smoker     Packs/day: 0.50     Years: 25.00     Pack years: 12.50     Types: Cigarettes     Last attempt to quit: 2015     Years since quitting: 3.5    Smokeless tobacco: Never Used    Tobacco comment: started to smoke at 12 / smoked up to 1 to 1.5 p.p.d / quit smoking 2015   Substance Use Topics    Alcohol use:  Yes     Alcohol/week: 0.0 oz     Comment: socially        Vitals:    19 1712   BP: 108/68   Site: Left Upper Arm   Position: Sitting   Cuff Size: Large Adult   Pulse: 61   SpO2: 96%   Weight: 250 lb (113.4 kg)     Estimated body mass index is 28.89 kg/m² as calculated from the following:    Height as of 2/25/19: 6' 6\" (1.981 m). Weight as of this encounter: 250 lb (113.4 kg). Physical Exam   Constitutional: He appears well-nourished. HENT:   Head: Normocephalic. Right Ear: External ear normal.   Left Ear: External ear normal.   Mouth/Throat: No oropharyngeal exudate. Eyes: Pupils are equal, round, and reactive to light. Conjunctivae are normal. Right eye exhibits no discharge. Neck: Normal range of motion. Neck supple. No tracheal deviation present. No thyromegaly present. Cardiovascular: Normal rate, regular rhythm and normal heart sounds. Exam reveals no friction rub. No murmur heard. Pulmonary/Chest: Effort normal and breath sounds normal. No stridor. No respiratory distress. He has no wheezes. ASTHMA CONTROL TEST 4/3/2019   In the past 4 weeks, how much of the time did your asthma keep you from getting as much done at work, school or at home? 3   During the past 4 weeks, how often have you had shortness of breath? 3   During the past 4 weeks, how often did your asthma symptoms (wheezing, coughing, shortness of breath, chest tightness or pain) wake you up at night or earlier than usual in the morning? 5   During the past 4 weeks, how often have you used your rescue inhaler or nebulizer medication (such as albuterol)? 3   How would you rate your asthma control during the past 4 weeks? 4   Asthma Control Test Total Score 18       ASSESSMENT/PLAN:  1. Mild persistent asthma with status asthmaticus  stable  - fluticasone-vilanterol (BREO ELLIPTA) 100-25 MCG/INH AEPB inhaler; Inhale 1 puff into the lungs daily  Dispense: 60 each; Refill: 11      Return in about 2 months (around 6/3/2019) for asthma. An electronic signature was used to authenticate this note.     --Александр Araujo MD on 4/4/2019 at 3:45 PM

## 2019-04-04 ASSESSMENT — ENCOUNTER SYMPTOMS
COUGH: 0
EYE PAIN: 0
CHOKING: 0
EYE REDNESS: 0

## 2019-04-06 DIAGNOSIS — F32.1 MODERATE SINGLE CURRENT EPISODE OF MAJOR DEPRESSIVE DISORDER (HCC): ICD-10-CM

## 2019-04-08 RX ORDER — CITALOPRAM 20 MG/1
20 TABLET ORAL DAILY
Qty: 30 TABLET | Refills: 0 | Status: SHIPPED | OUTPATIENT
Start: 2019-04-08 | End: 2019-04-25 | Stop reason: SDUPTHER

## 2019-04-08 RX ORDER — APIXABAN 5 MG/1
TABLET, FILM COATED ORAL
Qty: 60 TABLET | Refills: 0 | Status: SHIPPED | OUTPATIENT
Start: 2019-04-08 | End: 2019-06-10 | Stop reason: SDUPTHER

## 2019-04-22 ENCOUNTER — NURSE TRIAGE (OUTPATIENT)
Dept: OTHER | Facility: CLINIC | Age: 44
End: 2019-04-22

## 2019-04-25 DIAGNOSIS — F32.1 MODERATE SINGLE CURRENT EPISODE OF MAJOR DEPRESSIVE DISORDER (HCC): ICD-10-CM

## 2019-04-29 ENCOUNTER — TELEPHONE (OUTPATIENT)
Dept: INTERNAL MEDICINE CLINIC | Age: 44
End: 2019-04-29

## 2019-04-29 RX ORDER — CITALOPRAM 20 MG/1
20 TABLET ORAL DAILY
Qty: 30 TABLET | Refills: 0 | Status: SHIPPED | OUTPATIENT
Start: 2019-04-29 | End: 2019-06-05 | Stop reason: SDUPTHER

## 2019-04-29 RX ORDER — SUVOREXANT 10 MG/1
TABLET, FILM COATED ORAL
Qty: 30 TABLET | Refills: 2 | Status: SHIPPED | OUTPATIENT
Start: 2019-04-29 | End: 2019-07-28

## 2019-06-05 DIAGNOSIS — F32.1 MODERATE SINGLE CURRENT EPISODE OF MAJOR DEPRESSIVE DISORDER (HCC): ICD-10-CM

## 2019-06-05 RX ORDER — CITALOPRAM 20 MG/1
20 TABLET ORAL DAILY
Qty: 30 TABLET | Refills: 0 | Status: SHIPPED | OUTPATIENT
Start: 2019-06-05 | End: 2019-07-07 | Stop reason: SDUPTHER

## 2019-07-07 DIAGNOSIS — F32.1 MODERATE SINGLE CURRENT EPISODE OF MAJOR DEPRESSIVE DISORDER (HCC): ICD-10-CM

## 2019-07-09 ENCOUNTER — TELEPHONE (OUTPATIENT)
Dept: INTERNAL MEDICINE CLINIC | Age: 44
End: 2019-07-09

## 2019-07-09 RX ORDER — CITALOPRAM 20 MG/1
20 TABLET ORAL DAILY
Qty: 30 TABLET | Refills: 0 | Status: SHIPPED | OUTPATIENT
Start: 2019-07-09 | End: 2019-08-15 | Stop reason: SDUPTHER

## 2019-08-01 RX ORDER — APIXABAN 5 MG/1
TABLET, FILM COATED ORAL
Qty: 60 TABLET | Refills: 5 | OUTPATIENT
Start: 2019-08-01

## 2019-08-20 ENCOUNTER — HOSPITAL ENCOUNTER (EMERGENCY)
Age: 44
Discharge: LEFT BEFORE DISCHARGE | End: 2019-08-20
Payer: COMMERCIAL

## 2019-08-20 PROCEDURE — 4500000002 HC ER NO CHARGE

## 2019-08-28 ENCOUNTER — OFFICE VISIT (OUTPATIENT)
Dept: INTERNAL MEDICINE CLINIC | Age: 44
End: 2019-08-28
Payer: COMMERCIAL

## 2019-08-28 VITALS
DIASTOLIC BLOOD PRESSURE: 80 MMHG | WEIGHT: 247 LBS | OXYGEN SATURATION: 98 % | HEART RATE: 72 BPM | BODY MASS INDEX: 28.54 KG/M2 | SYSTOLIC BLOOD PRESSURE: 130 MMHG

## 2019-08-28 DIAGNOSIS — G47.30 SLEEP DISORDER BREATHING: ICD-10-CM

## 2019-08-28 DIAGNOSIS — Z20.2 CONTACT WITH AND (SUSPECTED) EXPOSURE TO INFECTIONS WITH A PREDOMINANTLY SEXUAL MODE OF TRANSMISSION: ICD-10-CM

## 2019-08-28 DIAGNOSIS — J45.32 MILD PERSISTENT ASTHMA WITH STATUS ASTHMATICUS: Primary | ICD-10-CM

## 2019-08-28 PROCEDURE — 1036F TOBACCO NON-USER: CPT | Performed by: INTERNAL MEDICINE

## 2019-08-28 PROCEDURE — 99213 OFFICE O/P EST LOW 20 MIN: CPT | Performed by: INTERNAL MEDICINE

## 2019-08-28 PROCEDURE — 36415 COLL VENOUS BLD VENIPUNCTURE: CPT | Performed by: INTERNAL MEDICINE

## 2019-08-28 PROCEDURE — G8427 DOCREV CUR MEDS BY ELIG CLIN: HCPCS | Performed by: INTERNAL MEDICINE

## 2019-08-28 PROCEDURE — G8417 CALC BMI ABV UP PARAM F/U: HCPCS | Performed by: INTERNAL MEDICINE

## 2019-08-28 ASSESSMENT — ENCOUNTER SYMPTOMS
EYE PAIN: 0
COUGH: 0
EYE REDNESS: 0
CHOKING: 0

## 2019-08-28 ASSESSMENT — ASTHMA QUESTIONNAIRES
QUESTION_2 LAST FOUR WEEKS HOW OFTEN HAVE YOU HAD SHORTNESS OF BREATH: 4
QUESTION_1 LAST FOUR WEEKS HOW MUCH OF THE TIME DID YOUR ASTHMA KEEP YOU FROM GETTING AS MUCH DONE AT WORK, SCHOOL OR AT HOME: 4
QUESTION_5 LAST FOUR WEEKS HOW WOULD YOU RATE YOUR ASTHMA CONTROL: 4
QUESTION_3 LAST FOUR WEEKS HOW OFTEN DID YOUR ASTHMA SYMPTOMS (WHEEZING, COUGHING, SHORTNESS OF BREATH, CHEST TIGHTNESS OR PAIN) WAKE YOU UP AT NIGHT OR EARLIER THAN USUAL IN THE MORNING: 5
QUESTION_4 LAST FOUR WEEKS HOW OFTEN HAVE YOU USED YOUR RESCUE INHALER OR NEBULIZER MEDICATION (SUCH AS ALBUTEROL): 4
ACT_TOTALSCORE: 21

## 2019-08-28 NOTE — PROGRESS NOTES
kg)   02/25/19 248 lb (112.5 kg)     BP Readings from Last 3 Encounters:   08/28/19 130/80   04/03/19 108/68   02/25/19 131/87     Body mass index is 28.54 kg/m². Facility age limit for growth percentiles is 20 years. Physical Exam   Constitutional: He appears well-nourished. No distress. HENT:   Head: Normocephalic. Right Ear: External ear normal.   Left Ear: External ear normal.   Mouth/Throat: Oropharynx is clear and moist.   Eyes: Pupils are equal, round, and reactive to light. Conjunctivae are normal. Right eye exhibits no discharge. Left eye exhibits no discharge. Neck: Normal range of motion. No JVD present. No tracheal deviation present. No thyromegaly present. Cardiovascular: Normal rate, regular rhythm and normal heart sounds. Exam reveals no friction rub. No murmur heard. Pulmonary/Chest: Effort normal and breath sounds normal. No stridor. No respiratory distress. He has no wheezes. ASTHMA CONTROL TEST 8/28/2019 4/3/2019   In the past 4 weeks, how much of the time did your asthma keep you from getting as much done at work, school or at home? 4 3   During the past 4 weeks, how often have you had shortness of breath? 4 3   During the past 4 weeks, how often did your asthma symptoms (wheezing, coughing, shortness of breath, chest tightness or pain) wake you up at night or earlier than usual in the morning? 5 5   During the past 4 weeks, how often have you used your rescue inhaler or nebulizer medication (such as albuterol)? 4 3   How would you rate your asthma control during the past 4 weeks? 4 4   Asthma Control Test Total Score 21 18     ASSESSMENT/PLAN:  1. Mild persistent asthma with status asthmaticus  Improved  -  Continue current treatment    2. Contact with and (suspected) exposure to infections with a predominantly sexual mode of transmission  - C.trachomatis N.gonorrhoeae DNA, Urine  - Syphilis Antibody Cascading Reflex  - HIV-1, Quantitative, Molecular  - Urine Culture    3.

## 2019-08-29 LAB — TOTAL SYPHILLIS IGG/IGM: NORMAL

## 2019-08-30 LAB
C. TRACHOMATIS DNA ,URINE: NEGATIVE
N. GONORRHOEAE DNA, URINE: NEGATIVE
URINE CULTURE, ROUTINE: NORMAL

## 2019-08-31 LAB
HIV-1 QNT LOG, IU/ML: NOT DETECTED LOG CPY/ML
HIV-1 QNT, IU/ML: NOT DETECTED CPY/ML
INTERPRETATION: NOT DETECTED

## 2020-01-13 ENCOUNTER — OFFICE VISIT (OUTPATIENT)
Dept: INTERNAL MEDICINE CLINIC | Age: 45
End: 2020-01-13
Payer: COMMERCIAL

## 2020-01-13 VITALS
OXYGEN SATURATION: 97 % | BODY MASS INDEX: 27.85 KG/M2 | HEART RATE: 65 BPM | WEIGHT: 241 LBS | DIASTOLIC BLOOD PRESSURE: 60 MMHG | SYSTOLIC BLOOD PRESSURE: 114 MMHG

## 2020-01-13 PROCEDURE — 99213 OFFICE O/P EST LOW 20 MIN: CPT | Performed by: INTERNAL MEDICINE

## 2020-01-13 RX ORDER — METHYLPREDNISOLONE 4 MG/1
TABLET ORAL
Qty: 1 KIT | Refills: 0 | Status: SHIPPED | OUTPATIENT
Start: 2020-01-13 | End: 2020-01-19

## 2020-01-13 RX ORDER — CYCLOBENZAPRINE HCL 10 MG
10 TABLET ORAL 3 TIMES DAILY PRN
Qty: 30 TABLET | Refills: 0 | Status: SHIPPED | OUTPATIENT
Start: 2020-01-13 | End: 2020-01-23

## 2020-01-13 ASSESSMENT — ENCOUNTER SYMPTOMS
BOWEL INCONTINENCE: 0
BACK PAIN: 1
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
## 2020-01-20 ENCOUNTER — HOSPITAL ENCOUNTER (OUTPATIENT)
Dept: PHYSICAL THERAPY | Age: 45
Setting detail: THERAPIES SERIES
Discharge: HOME | End: 2020-01-20
Payer: COMMERCIAL

## 2020-01-20 PROCEDURE — 97161 PT EVAL LOW COMPLEX 20 MIN: CPT

## 2020-01-20 PROCEDURE — 97530 THERAPEUTIC ACTIVITIES: CPT

## 2020-01-20 PROCEDURE — 97110 THERAPEUTIC EXERCISES: CPT

## 2020-01-20 RX ORDER — VALACYCLOVIR HYDROCHLORIDE 1 G/1
1000 TABLET, FILM COATED ORAL DAILY
Qty: 30 TABLET | Refills: 11 | Status: SHIPPED | OUTPATIENT
Start: 2020-01-20 | End: 2020-02-07 | Stop reason: SDUPTHER

## 2020-01-20 NOTE — FLOWSHEET NOTE
proximal hip and/or LS spine soft tissue/joints for the purpose of modulating pain, promoting relaxation,  increasing ROM, reducing/eliminating soft tissue swelling/inflammation/restriction, improving soft tissue extensibility and allowing for proper ROM for normal function with   [] LE / lumbar: self care, mobility, lifting and ambulation. [] UE / Cervical: self care, reaching, carrying, lifting, house/yardwork, driving, computer work. Modalities:  [] (89256) Vasopneumatic compression: Utilized vasopneumatic compression to decrease edema / swelling for the purpose of improving mobility and quad tone / recruitment which will allow for increased overall function including but not limited to self-care, transfers, ambulation, and ascending / descending stairs. Modalities:      Charges:  Timed Code Treatment Minutes: 29   64      [x] EVAL - LOW (86285)   [] EVAL - MOD (56824)  [] EVAL - HIGH (62743)  [] RE-EVAL (04268)  [x] XB(41926) x 1       [] Ionto  [] NMR (01053) x       [] Vaso  [] Manual (10560) x       [] Ultrasound  [x] TA x 2        [] Mech Traction (21411)  [] Aquatic Therapy x     [] ES (un) (02631):   [] Home Management Training x  [] ES(attended) (81385)   [] Dry Needling 1-2 muscles (42567):  [] Dry Needling 3+ muscles (911810  [] Group:      [] Other:     GOALS: Short Term Goals: To be achieved in: 2 weeks  1. Independent in HEP and progression per patient tolerance, in order to prevent re-injury. [] Progressing: [] Met: [] Not Met: [] Adjusted  2. Patient will have a decrease in pain to facilitate improvement in movement, function, and ADLs as indicated by Functional Deficits. [] Progressing: [] Met: [] Not Met: [] Adjusted    Long Term Goals: To be achieved in: 5 weeks  2.  Patient will demonstrate increased AROM to [facility] of thoracic/lumbar spine, including improving lumbar flexion to 80* and good LS mobility, good hip ROM to allow for proper joint functioning as indicated by patients Other:     Prognosis: [] Good [] Fair  [] Poor    Patient Requires Follow-up: [x] Yes  [] No    Plan for next treatment session:    PLAN: See eval. PT 2x / week for 5weeks. [] Continue per plan of care [] Alter current plan (see comments)  [x] Plan of care initiated [] Hold pending MD visit [] Discharge    Electronically signed by: Marianela Sahu PT    Note: If patient does not return for scheduled/ recommended follow up visits, his note will serve as a discharge from care along with most recent update on progress.
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
## 2020-01-23 ENCOUNTER — HOSPITAL ENCOUNTER (OUTPATIENT)
Dept: PHYSICAL THERAPY | Age: 45
Setting detail: THERAPIES SERIES
Discharge: HOME | End: 2020-01-23
Payer: COMMERCIAL

## 2020-01-23 ENCOUNTER — TELEPHONE (OUTPATIENT)
Dept: INTERNAL MEDICINE CLINIC | Age: 45
End: 2020-01-23

## 2020-01-23 PROCEDURE — G0283 ELEC STIM OTHER THAN WOUND: HCPCS

## 2020-01-23 PROCEDURE — 97140 MANUAL THERAPY 1/> REGIONS: CPT

## 2020-01-23 PROCEDURE — 97110 THERAPEUTIC EXERCISES: CPT

## 2020-01-23 NOTE — FLOWSHEET NOTE
Neuromuscular Re-ed (53720)       Swiss Ball       Multifidi Walkout/Paloff                                    Manual Intervention (46498)       Gentle P/A glides C11-T10 X 5 mins    Gentle, with pain           Sideling mobs L5 rotation  X 3 mins                                Pt. Education:  -patient educated on diagnosis, prognosis and expectations for rehab  -all patient questions were answered    HEP instruction:  -patient provided with written and illustrated instructions for HEP (see scanned image in ): prone press up, prone on elbows, kneeling/standing hip flexor stretch, standing hamstring stretch, mid/high rows blue TB    Therapeutic Exercise and NMR EXR  [] (43102) Provided verbal/tactile cueing for activities related to strengthening, flexibility, endurance, ROM for improvements in  [] LE / Lumbar: LE, proximal hip, and core control with self care, mobility, lifting, ambulation. [] UE / Cervical: cervical, postural, scapular, scapulothoracic and UE control with self care, reaching, carrying, lifting, house/yardwork, driving, computer work.  [] (38137) Provided verbal/tactile cueing for activities related to improving balance, coordination, kinesthetic sense, posture, motor skill, proprioception to assist with   [] LE / lumbar: LE, proximal hip, and core control in self care, mobility, lifting, ambulation and eccentric single leg control. [] UE / cervical: cervical, scapular, scapulothoracic and UE control with self care, reaching, carrying, lifting, house/yardwork, driving, computer work.   [] (24827) Therapist is in constant attendance of 2 or more patients providing skilled therapy interventions, but not providing any significant amount of measurable one-on-one time to either patient, for improvements in  [] LE / lumbar: LE, proximal hip, and core control in self care, mobility, lifting, ambulation and eccentric single leg control.    [] UE / cervical: cervical, cervical, postural,  scapular, scapulothoracic and UE control with self care, reaching, carrying, lifting, house/yardwork, driving, computer work    Manual Treatments:  PROM / STM / Oscillations-Mobs:  G-I, II, III, IV (PA's, Inf., Post.)  [] (23569) Provided manual therapy to mobilize LE, proximal hip and/or LS spine soft tissue/joints for the purpose of modulating pain, promoting relaxation,  increasing ROM, reducing/eliminating soft tissue swelling/inflammation/restriction, improving soft tissue extensibility and allowing for proper ROM for normal function with   [] LE / lumbar: self care, mobility, lifting and ambulation. [] UE / Cervical: self care, reaching, carrying, lifting, house/yardwork, driving, computer work. Modalities:  [] (84311) Vasopneumatic compression: Utilized vasopneumatic compression to decrease edema / swelling for the purpose of improving mobility and quad tone / recruitment which will allow for increased overall function including but not limited to self-care, transfers, ambulation, and ascending / descending stairs. Modalities:  123 E-stim IFC  to lumbar paraspinals with CP in supine position with bolster under knees x 15'        Charges:  Timed Code Treatment Minutes: 32   Total Treatment Minutes: 47     [] EVAL - LOW (76087)   [] EVAL - MOD (93837)  [] EVAL - HIGH (98268)  [] RE-EVAL (07124)  [x] BL(32503) x 1      [] Ionto  [] NMR (95571) x       [] Vaso  [x] Manual (33880) x  1     [] Ultrasound  [] TA x 2        [] Mech Traction (33732)  [] Aquatic Therapy x     [x] ES (un) (92318):   [] Home Management Training x  [] ES(attended) (13015)   [] Dry Needling 1-2 muscles (28931):  [] Dry Needling 3+ muscles (660598  [] Group:      [] Other:     GOALS: Short Term Goals: To be achieved in: 2 weeks  1. Independent in HEP and progression per patient tolerance, in order to prevent re-injury. [] Progressing: [] Met: [] Not Met: [] Adjusted  2.  Patient will have a decrease in pain to facilitate improvement in movement, function, and ADLs as indicated by Functional Deficits. [] Progressing: [] Met: [] Not Met: [] Adjusted    Long Term Goals: To be achieved in: 5 weeks  2. Patient will demonstrate increased [facility]/lumbar spine, including improving lumbar flexion to 80* and good LS mobility, good hip ROM to allow for proper joint functioning as indicated by patients Functional Deficits. [] Progressing: [] Met: [] Not Met: [] Adjusted  3. Patient will demonstrate an increase in postural awareness and control and activation of deep lumbar stabilizers to allow for proper functional mobility as indicated by patients Functional Deficits. [] Progressing: [] Met: [] Not Met: [] Adjusted  4. Patient will demonstrate an increase in Strength to good proximal hip and core activation to allow for proper functional mobility as indicated by patients Functional Deficits. [] Progressing: [] Met: [] Not Met: [] Adjusted  5. Patient will return to functional activities including standing for > 1 hour  without increased symptoms or restriction. [] Progressing: [] Met: [] Not Met: [] Adjusted  6. 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 [] Progression is slowed due to complexities/Impairments listed. [] Progression has been slowed due to co-morbidities.   [x] Plan just implemented, too soon to assess goals progression <30days   [] Goals require adjustment due to lack of progress  [] Patient is not progressing as expected and requires additional follow up with physician  [] Other    Persisting Functional

## 2020-01-28 ENCOUNTER — HOSPITAL ENCOUNTER (OUTPATIENT)
Dept: PHYSICAL THERAPY | Age: 45
Setting detail: THERAPIES SERIES
Discharge: HOME | End: 2020-01-28
Payer: COMMERCIAL

## 2020-01-28 PROCEDURE — 97110 THERAPEUTIC EXERCISES: CPT

## 2020-01-28 PROCEDURE — G0283 ELEC STIM OTHER THAN WOUND: HCPCS

## 2020-01-28 PROCEDURE — 97140 MANUAL THERAPY 1/> REGIONS: CPT

## 2020-01-28 NOTE — FLOWSHEET NOTE
[address] [facility]  Phone: [phone]   Fax: [phone]    Physical Therapy Daily Treatment Note  Date:  2020    Patient Name:  Flores Rodriguez    :  1975  MRN: [id number]  Medical/Treatment Diagnosis Information:  · Diagnosis: Thoracic/Lumbar DDD M51.36, M51.34  · Treatment Diagnosis: Muscle guarding thoracic/lumbar, tight hip flexors/hamstrings, decreased core activation, poor sitting/standing tolerance, inability to work   Insurance/Certification information:  PT Insurance Information: [facility] 40 visits/year  Physician Information:  Referring Practitioner: [address] [place] [address] signed (Y/N): []  Yes [x]  No     Date of Patient follow up with Physician:      Progress Report: []  Yes  [x]  No     Date Range for reporting period:  Beginning    Ending    Progress report due (10 Rx/or 30 days whichever is less):     Recertification due (POC duration/ or 90 days whichever is less):    Visit # Insurance Allowable Auth required?  Date Range   3/10 40 []  Yes  [x]  No      Latex Allergy:  [x]NO      []YES  Preferred Language for Healthcare:   [x]English       []other:    Functional Scale:      Pain level: 6-7/10     SUBJECTIVE:  Says the knot in his shoulder blade has been bothering him a lot (points to mid thoracic spine), lower back stays about the same     OBJECTIVE: See eval      RESTRICTIONS/PRECAUTIONS:     Exercises/Interventions:     Therapeutic Exercises (23089) Resistance / level Sets/sec Reps Notes   Bike       HS/Hip Flexor Stretch   30\"  X 2 each     Prone Press Up  3\"  X 10     Prone Hip Extension     If tolerated    Cat/Camel    X 10     Lumbar Ext over SB       Prayer Stretch   X 10     Supine 90/90 HS Stretch with sciatic nerve flossing   10\" X 10 B    Tra hooklying  5\" X 10  Tactile cues with pt placing hands on his ASIS   1/2 foam W's against wall   1/2 foam scap squeezes   X 10  X 10     Horiz Abd on 1/2 foam  blue  2 x 5  With mid scap discomfort                        Therapeutic Activities (48828)              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 Education:  -patient educated on diagnosis, prognosis and expectations for rehab  -all patient questions were answered    HEP instruction:  -patient provided with written and illustrated instructions for HEP (see scanned image in ): prone press up, prone on elbows, kneeling/standing hip flexor stretch, standing hamstring stretch, mid/high rows blue TB    Therapeutic Exercise and NMR EXR  [] (70072) Provided verbal/tactile cueing for activities related to strengthening, flexibility, endurance, ROM for improvements in  [] LE / Lumbar: LE, proximal hip, and core control with self care, mobility, lifting, ambulation. [] UE / Cervical: cervical, postural, scapular, scapulothoracic and UE control with self care, reaching, carrying, lifting, house/yardwork, driving, computer work.  [] (26155) Provided verbal/tactile cueing for activities related to improving balance, coordination, kinesthetic sense, posture, motor skill, proprioception to assist with   [] LE / lumbar: LE, proximal hip, and core control in self care, mobility, lifting, ambulation and eccentric single leg control.    [] UE / cervical: cervical, scapular, scapulothoracic and UE control with self care, reaching, carrying, lifting, house/yardwork, driving, computer work.   [] (77568) Therapist is in constant attendance of 2 or more patients providing skilled therapy interventions, but not providing any significant amount of Dry Needling 1-2 muscles (46906):  [] Dry Needling 3+ muscles (229748  [] Group:      [] Other:     GOALS: Short Term Goals: To be achieved in: 2 weeks  1. Independent in HEP and progression per patient tolerance, in order to prevent re-injury. [] Progressing: [] Met: [] Not Met: [] Adjusted  2. Patient will have a decrease in pain to facilitate improvement in movement, function, and ADLs as indicated by Functional Deficits. [] Progressing: [] Met: [] Not Met: [] Adjusted    Long Term Goals: To be achieved in: 5 weeks  2. Patient will demonstrate increased AROM to [employer] of thoracic/lumbar spine, including improving lumbar flexion to 80* and good LS mobility, good hip ROM to allow for proper joint functioning as indicated by patients Functional Deficits. [] Progressing: [] Met: [] Not Met: [] Adjusted  3. Patient will demonstrate an increase in postural awareness and control and activation of deep lumbar stabilizers to allow for proper functional mobility as indicated by patients Functional Deficits. [] Progressing: [] Met: [] Not Met: [] Adjusted  4. Patient will demonstrate an increase in Strength to good proximal hip and core activation to allow for proper functional mobility as indicated by patients Functional Deficits. [] Progressing: [] Met: [] Not Met: [] Adjusted  5. Patient will return to functional activities including standing for > 1 hour  without increased symptoms or restriction. [] Progressing: [] Met: [] Not Met: [] Adjusted  6. 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 [] Progression is slowed due to complexities/Impairments listed.   [] Progression has been slowed due to co-morbidities. [x] Plan just implemented, too soon to assess goals progression <30days   [] Goals require adjustment due to lack of progress  [] Patient is not progressing as expected and requires additional follow up with physician  [] Other    Persisting Functional Limitations/Impairments:  [x]Sleeping []Sitting               [x]Standing [x]Transfers        [x]Walking []Kneeling               [x]Stairs [x]Squatting / bending   [x]ADLs [x]Reaching  [x]Lifting  []Housework  [x]Driving [x]Job related tasks  []Sports/Recreation []Other:        ASSESSMENT:   Pt very limited/guarding throughout thoracic and lumbar spine. Inconsistent with biases, with progressive changes in where symptoms are located. Continues to be very guarded and limited with all interventions. Treatment/Activity Tolerance:  [] Patient able to complete tx [] Patient limited by fatigue  [x] Patient limited by pain  [] Patient limited by other medical complications  [x] Other:  Poor tolerance overall, guarded with all activities this date     Prognosis: [] Good [] Fair  [] Poor    Patient Requires Follow-up: [x] Yes  [] No    Plan for next treatment session:    PLAN: See eval. PT 2x / week for 5weeks. [x] Continue per plan of care [] Alter current plan (see comments)  [] Plan of care initiated [] Hold pending MD visit [] Discharge    Electronically signed by: Neeru Perez PT    Note: If patient does not return for scheduled/ recommended follow up visits, his note will serve as a discharge from care along with most recent update on progress.

## 2020-01-30 ENCOUNTER — HOSPITAL ENCOUNTER (OUTPATIENT)
Dept: PHYSICAL THERAPY | Age: 45
Setting detail: THERAPIES SERIES
Discharge: HOME | End: 2020-01-30
Payer: COMMERCIAL

## 2020-01-30 PROCEDURE — G0283 ELEC STIM OTHER THAN WOUND: HCPCS

## 2020-01-30 PROCEDURE — 97140 MANUAL THERAPY 1/> REGIONS: CPT

## 2020-01-30 PROCEDURE — 97110 THERAPEUTIC EXERCISES: CPT

## 2020-01-30 NOTE — FLOWSHEET NOTE
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 Education:  -patient educated on diagnosis, prognosis and expectations for rehab  -all patient questions were answered    Manual: 1/30: MET to correct R Pubic upslip, L4 NRRSL/ERSR, L5 FRSR, L3 FRSL, ANA MARIA sacral torsion, L SI anterior innominate SI, R SI posterior innominate, L5 flexion mobs grade II followed with PA yaakov Grade II L1-L5, sacral respiration mobs X15'  HEP instruction:  -patient provided with written and illustrated instructions for HEP (see scanned image in ): prone press up, prone on elbows, kneeling/standing hip flexor stretch, standing hamstring stretch, mid/high rows blue TB    Therapeutic Exercise and NMR EXR  [x] (09000) Provided verbal/tactile cueing for activities related to strengthening, flexibility, endurance, ROM for improvements in  [] LE / Lumbar: LE, proximal hip, and core control with self care, mobility, lifting, ambulation. [] UE / Cervical: cervical, postural, scapular, scapulothoracic and UE control with self care, reaching, carrying, lifting, house/yardwork, driving, computer work.  [] (62640) Provided verbal/tactile cueing for activities related to improving balance, coordination, kinesthetic sense, posture, motor skill, proprioception to assist with   [] LE / lumbar: LE, proximal hip, and core control in self care, mobility, lifting, ambulation and eccentric single leg control.    [] UE / cervical: cervical, scapular, scapulothoracic and UE control with self care, reaching, carrying, lifting, house/yardwork, driving, computer work.   [] (21815) Therapist is in constant attendance of 2 or more patients providing skilled therapy interventions, but not providing any significant amount of measurable one-on-one time to either patient, for improvements in  [] LE / lumbar: LE, proximal hip, and core control in self care, mobility, lifting, ambulation and eccentric single leg control. [] UE / cervical: cervical, scapular, scapulothoracic and UE control with self care, reaching, carrying, lifting, house/yardwork, driving, computer work. NMR and Therapeutic Activities:    [] (29658 or 99062) Provided verbal/tactile cueing for activities related to improving balance, coordination, kinesthetic sense, posture, motor skill, proprioception and motor activation to allow for proper function of   [] LE: / Lumbar core, proximal hip and LE with self care and ADLs  [] UE / Cervical: cervical, postural, scapular, scapulothoracic and UE control with self care, carrying, lifting, driving, computer work.   [] (35129) Gait Re-education- Provided training and instruction to the patient for proper LE, core and proximal hip recruitment and positioning and eccentric body weight control with ambulation re-education including up and down stairs     Home Management Training / Self Care:  [] (38048) Provided self-care/home management training related to activities of daily living and compensatory training, and/or use of adaptive equipment for improvement with: ADLs and compensatory training, meal preparation, safety procedures and instruction in use of adaptive equipment, including bathing, grooming, dressing, personal hygiene, basic household cleaning and chores.      Home Exercise Program:    [x] (21379) Reviewed/Progressed HEP activities related to strengthening, flexibility, endurance, ROM of   [] LE / Lumbar: core, proximal hip and LE for functional self-care, mobility, lifting and ambulation/stair navigation   [] UE / Cervical: cervical, postural, scapular, scapulothoracic and UE control with self care, reaching, carrying, lifting, house/yardwork, driving, computer work  [] (95980)Reviewed/Progressed HEP activities related to improving balance, coordination, kinesthetic sense, posture, motor skill, proprioception of   [] LE: core, proximal hip and LE for self care, mobility, lifting, and ambulation/stair navigation    [] UE / Cervical: cervical, postural,  scapular, scapulothoracic and UE control with self care, reaching, carrying, lifting, house/yardwork, driving, computer work    Manual Treatments:  PROM / STM / Oscillations-Mobs:  G-I, II, III, IV (PA's, Inf., Post.)  [x] (14418) Provided manual therapy to mobilize LE, proximal hip and/or LS spine soft tissue/joints for the purpose of modulating pain, promoting relaxation,  increasing ROM, reducing/eliminating soft tissue swelling/inflammation/restriction, improving soft tissue extensibility and allowing for proper ROM for normal function with   [] LE / lumbar: self care, mobility, lifting and ambulation. [] UE / Cervical: self care, reaching, carrying, lifting, house/yardwork, driving, computer work. Modalities:  [x] (47730) Vasopneumatic compression: Utilized vasopneumatic compression to decrease edema / swelling for the purpose of improving mobility and quad tone / recruitment which will allow for increased overall function including but not limited to self-care, transfers, ambulation, and ascending / descending stairs. Modalities:    1/30 E-stim IFC  to lumbar paraspinals with CP in prone position with Towel rolls under L/R legs X15'  1/28.   Premod to mid scapula, lumbar spine with Cold packs with grey bolster under knees x 15 mins  1/23 E-stim IFC  to lumbar paraspinals with CP in supine position with bolster under knees x 15'        Charges:  Timed Code Treatment Minutes: 30   Total Treatment Minutes: 47     [] EVAL - LOW (25457)   [] EVAL - MOD (53714)  [] EVAL - HIGH (37941)  [] RE-EVAL (99851)  [x] PS(20712) x 1

## 2020-02-04 ENCOUNTER — HOSPITAL ENCOUNTER (OUTPATIENT)
Dept: PHYSICAL THERAPY | Age: 45
Setting detail: THERAPIES SERIES
Discharge: HOME | End: 2020-02-04
Payer: COMMERCIAL

## 2020-02-04 PROCEDURE — G0283 ELEC STIM OTHER THAN WOUND: HCPCS

## 2020-02-04 PROCEDURE — 97110 THERAPEUTIC EXERCISES: CPT

## 2020-02-04 PROCEDURE — 97140 MANUAL THERAPY 1/> REGIONS: CPT

## 2020-02-04 NOTE — FLOWSHEET NOTE
[address] [facility] Physical Therapy  Phone: [phone]   Fax: [phone]    Physical Therapy Daily Treatment Note  Date:  2020    Patient Name:  Edward Payne    :  1975  MRN: [id number]  Medical/Treatment Diagnosis Information:  · Diagnosis: Thoracic/Lumbar DDD M51.36, M51.34  · Treatment Diagnosis: Muscle guarding thoracic/lumbar, tight hip flexors/hamstrings, decreased core activation, poor sitting/standing tolerance, inability to work   Insurance/Certification information:  PT Insurance Information: [facility] 40 visits/year  Physician Information:  Referring Practitioner: [address] [place] [address] signed (Y/N): []  Yes [x]  No     Date of Patient follow up with Physician:      Progress Report: []  Yes  [x]  No     Date Range for reporting period:  Beginning    Ending    Progress report due (10 Rx/or 30 days whichever is less):     Recertification due (POC duration/ or 90 days whichever is less):    Visit # Insurance Allowable Auth required?  Date Range   5/10 40 []  Yes  [x]  No      Latex Allergy:  [x]NO      []YES  Preferred Language for Healthcare:   [x]English       []other:    Functional Scale:      Pain level: 5/10 middle of back; 7-8/10 R Lower Lumbar    SUBJECTIVE:   Says whenever he tries to do anything (ie: mopping), it makes it worse     OBJECTIVE: See eval      RESTRICTIONS/PRECAUTIONS:  Pt on blood thinners (may be contraindication for dry needling)    Exercises/Interventions:     Therapeutic Exercises (59702) Resistance / level Sets/sec Reps Notes   UBE Level 1.5 1.5' FWD, 1.5' Bkwd     HS/Hip Flexor Stretch   30\"  X 2 each     Prone Press Up  3\"  X 10     Prone Hip Extension     If tolerated    Cat/Camel        Lumbar Ext over SB  Lumbar Sidebend over SB  10\"  5\"  X 10  X 5 B     Prayer Stretch       Supine 90/90 HS Stretch with sciatic nerve flossing   10\" X 10 B    Tra hooklying  1/2 foam W's against wall   1/2 foam scap squeezes  Horiz Abd on 1/2 foam  blue Lat stretch ballet bar   20\" x 3                  Therapeutic Activities (75644)              Functional lifting 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 Education:  -patient educated on diagnosis, prognosis and expectations for rehab  -all patient questions were answered  2/4  Educated pt regarding dry needling, discussed contraindications with Bonita, dry needling therapist, present. Talked to pt about discussing potential dry needling with his PCP, Dr Kenny Zepeda, who prescribes him the blood thinners, to get approval for the dry needling prior to proceeding at a future date. Dry Needling acknowledgement form given to patient this date. Manual: 1/30: MET to correct R Pubic upslip, L4 NRRSL/ERSR, L5 FRSR, L3 FRSL, ANA MARIA sacral torsion, L SI anterior innominate SI, R SI posterior innominate, L5 flexion mobs grade II followed with KILO nuno Grade II L1-L5, sacral respiration mobs X15'  HEP instruction:  -patient provided with written and illustrated instructions for HEP (see scanned image in ): prone press up, prone on elbows, kneeling/standing hip flexor stretch, standing hamstring stretch, mid/high rows blue TB    Therapeutic Exercise and NMR EXR  [x] (95765) Provided verbal/tactile cueing for activities related to strengthening, flexibility, endurance, ROM for improvements in  [] LE / Lumbar: LE, proximal hip, and core control with self care, mobility, lifting, ambulation.   [] UE / Cervical: cervical, postural, scapular, scapulothoracic and UE control with self care, reaching, carrying, lifting, house/yardwork, driving, computer work.  [] (18949) Provided verbal/tactile cueing for activities related to improving balance, coordination, kinesthetic sense, posture, motor skill, proprioception to assist with   [] LE / lumbar: LE, proximal hip, and core control in self care, mobility, lifting, ambulation and eccentric single leg control. [] UE / cervical: cervical, scapular, scapulothoracic and UE control with self care, reaching, carrying, lifting, house/yardwork, driving, computer work.   [] (15476) Therapist is in constant attendance of 2 or more patients providing skilled therapy interventions, but not providing any significant amount of measurable one-on-one time to either patient, for improvements in  [] LE / lumbar: LE, proximal hip, and core control in self care, mobility, lifting, ambulation and eccentric single leg control. [] UE / cervical: cervical, scapular, scapulothoracic and UE control with self care, reaching, carrying, lifting, house/yardwork, driving, computer work.      NMR and Therapeutic Activities:    [] (01393 or 29485) Provided verbal/tactile cueing for activities related to improving balance, coordination, kinesthetic sense, posture, motor skill, proprioception and motor activation to allow for proper function of   [] LE: / Lumbar core, proximal hip and LE with self care and ADLs  [] UE / Cervical: cervical, postural, scapular, scapulothoracic and UE control with self care, carrying, lifting, driving, computer work.   [] (42784) Gait Re-education- Provided training and instruction to the patient for proper LE, core and proximal hip recruitment and positioning and eccentric body weight control with ambulation re-education including up and down stairs     Home Management Training / Self Care:  [] (60727) Provided self-care/home management training related to activities of daily living and compensatory training, and/or use of adaptive equipment for improvement with: ADLs and compensatory training, meal preparation, safety procedures and instruction in use of adaptive equipment, including bathing, grooming, dressing, personal hygiene, basic household cleaning and chores. Home Exercise Program:    [x] (47338) Reviewed/Progressed HEP activities related to strengthening, flexibility, endurance, ROM of   [] LE / Lumbar: core, proximal hip and LE for functional self-care, mobility, lifting and ambulation/stair navigation   [] UE / Cervical: cervical, postural, scapular, scapulothoracic and UE control with self care, reaching, carrying, lifting, house/yardwork, driving, computer work  [] (76039)Reviewed/Progressed HEP activities related to improving balance, coordination, kinesthetic sense, posture, motor skill, proprioception of   [] LE: core, proximal hip and LE for self care, mobility, lifting, and ambulation/stair navigation    [] UE / Cervical: cervical, postural,  scapular, scapulothoracic and UE control with self care, reaching, carrying, lifting, house/yardwork, driving, computer work    Manual Treatments:  PROM / STM / Oscillations-Mobs:  G-I, II, III, IV (PA's, Inf., Post.)  [x] (13836) Provided manual therapy to mobilize LE, proximal hip and/or LS spine soft tissue/joints for the purpose of modulating pain, promoting relaxation,  increasing ROM, reducing/eliminating soft tissue swelling/inflammation/restriction, improving soft tissue extensibility and allowing for proper ROM for normal function with   [] LE / lumbar: self care, mobility, lifting and ambulation. [] UE / Cervical: self care, reaching, carrying, lifting, house/yardwork, driving, computer work.      Modalities:  [x] (82517) Vasopneumatic compression: Utilized vasopneumatic compression to decrease edema / swelling for the purpose of improving mobility and quad tone / recruitment which will allow for increased overall function including but not limited to self-care, transfers, ambulation, and ascending / descending stairs. Modalities:    2/4, 1/30 E-stim IFC  to lumbar paraspinals with CP in prone position with Towel rolls under L/R legs X15'  1/28. Premod to mid scapula, lumbar spine with Cold packs with grey bolster under knees x 15 mins  1/23 E-stim IFC  to lumbar paraspinals with CP in supine position with bolster under knees x 15'        Charges:  Timed Code Treatment Minutes: 30   Total Treatment Minutes: 47     [] EVAL - LOW (55043)   [] EVAL - MOD (24999)  [] EVAL - HIGH (75666)  [] RE-EVAL (61577)  [x] WL(41840) x 1      [] Ionto  [] NMR (93283) x       [] Vaso  [x] Manual (19266) x  1     [] Ultrasound  [] TA x 2        [] Mech Traction (48262)  [] Aquatic Therapy x      [x] ES (un) (27216):   [] Home Management Training x  [] ES(attended) (17497)   [] Dry Needling 1-2 muscles (88419):  [] Dry Needling 3+ muscles (862766  [] Group:      [] Other:     GOALS: Short Term Goals: To be achieved in: 2 weeks  1. Independent in HEP and progression per patient tolerance, in order to prevent re-injury. [] Progressing: [] Met: [] Not Met: [] Adjusted  2. Patient will have a decrease in pain to facilitate improvement in movement, function, and ADLs as indicated by Functional Deficits. [] Progressing: [] Met: [] Not Met: [] Adjusted    Long Term Goals: To be achieved in: 5 weeks  2. Patient will demonstrate increased AROM to [employer] of thoracic/lumbar spine, including improving lumbar flexion to 80* and good LS mobility, good hip ROM to allow for proper joint functioning as indicated by patients Functional Deficits. [] Progressing: [] Met: [] Not Met: [] Adjusted  3. Patient will demonstrate an increase in postural awareness and control and activation of deep lumbar stabilizers to allow for proper functional mobility as indicated by patients Functional Deficits. [] Progressing: [] Met: [] Not Met: [] Adjusted  4.  Patient will demonstrate an increase in Strength to good proximal hip and core activation to allow for proper functional mobility as indicated by patients Functional Deficits. [] Progressing: [] Met: [] Not Met: [] Adjusted  5. Patient will return to functional activities including standing for > 1 hour  without increased symptoms or restriction. [] Progressing: [] Met: [] Not Met: [] Adjusted  6. 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 [x] Progression is slowed due to complexities/Impairments listed. [] Progression has been slowed due to co-morbidities. [] Plan just implemented, too soon to assess goals progression <30days   [] Goals require adjustment due to lack of progress  [] Patient is not progressing as expected and requires additional follow up with physician  [] Other    Persisting Functional Limitations/Impairments:  [x]Sleeping []Sitting               [x]Standing [x]Transfers        [x]Walking []Kneeling               [x]Stairs [x]Squatting / bending   [x]ADLs [x]Reaching  [x]Lifting  []Housework  [x]Driving [x]Job related tasks  []Sports/Recreation []Other:        ASSESSMENT:   Patient presented with R pubic uplsip as well as a R LAteral shift at L4. Patient has significant muscle spasms and guarding but did show some improvement with gentle manual therapy. PAtient may also benefit from dry needling into Lumbar ps/multifidus in next few visits if spasms persists.   After achieving improved extension in spine and hips, patient's pain should decrease enough to progress better with lumbar stabilzation program.    Treatment/Activity Tolerance:  [] Patient able to complete tx [] Patient limited by fatigue  [x] Patient limited by pain  [] Patient limited by other medical complications  [x] Other:  Poor tolerance overall, guarded with all activities this date     Prognosis: [] Good [] Fair  [] Poor    Patient Requires Follow-up: [x] Yes  [] No    Plan for next treatment session:    PLAN: See eval. PT 2x / week for 5weeks. [x] Continue per plan of care [] Alter current plan (see comments)  [] Plan of care initiated [] Hold pending MD visit [] Discharge    Electronically signed by: Allyn Jain PT    Note: If patient does not return for scheduled/ recommended follow up visits, his note will serve as a discharge from care along with most recent update on progress.

## 2020-02-07 ENCOUNTER — HOSPITAL ENCOUNTER (OUTPATIENT)
Dept: PHYSICAL THERAPY | Age: 45
Setting detail: THERAPIES SERIES
Discharge: HOME | End: 2020-02-07
Payer: COMMERCIAL

## 2020-02-07 PROCEDURE — 97110 THERAPEUTIC EXERCISES: CPT

## 2020-02-07 PROCEDURE — G0283 ELEC STIM OTHER THAN WOUND: HCPCS

## 2020-02-07 RX ORDER — VALACYCLOVIR HYDROCHLORIDE 1 G/1
1000 TABLET, FILM COATED ORAL DAILY
Qty: 30 TABLET | Refills: 11 | Status: SHIPPED | OUTPATIENT
Start: 2020-02-07 | End: 2020-10-29

## 2020-02-07 RX ORDER — CITALOPRAM 20 MG/1
20 TABLET ORAL DAILY
Qty: 90 TABLET | Refills: 3 | Status: SHIPPED | OUTPATIENT
Start: 2020-02-07 | End: 2020-09-14

## 2020-02-07 NOTE — FLOWSHEET NOTE
[address] [facility] Physical Therapy  Phone: [phone]   Fax: [phone]    Physical Therapy Daily Treatment Note  Date:  2020    Patient Name:  Cherry Barragan    :  1975  MRN: [id number]  Medical/Treatment Diagnosis Information:  · Diagnosis: Thoracic/Lumbar DDD M51.36, M51.34  · Treatment Diagnosis: Muscle guarding thoracic/lumbar, tight hip flexors/hamstrings, decreased core activation, poor sitting/standing tolerance, inability to work   Insurance/Certification information:  PT Insurance Information: [facility] 40 visits/year  Physician Information:  Referring Practitioner: [address] [place] [address] signed (Y/N): []  Yes [x]  No     Date of Patient follow up with Physician:      Progress Report: []  Yes  [x]  No     Date Range for reporting period:  Beginning    Ending    Progress report due (10 Rx/or 30 days whichever is less):     Recertification due (POC duration/ or 90 days whichever is less):    Visit # Insurance Allowable Auth required? Date Range   6/10 40 []  Yes  [x]  No      Latex Allergy:  [x]NO      []YES  Preferred Language for Healthcare:   [x]English       []other:    Functional Scale:      Pain level: 1-2/10 middle of back; 3-4/10 R Lower Lumbar    SUBJECTIVE:   Feel a little better, stiff but OK, no major pain. Says he sent a message to his MD on [employer] regarding dry needling, and haven't got a response back.  Goes back to work on Monday      OBJECTIVE: See eval      RESTRICTIONS/PRECAUTIONS:  Pt on blood thinners (may be contraindication for dry needling)    Exercises/Interventions:     Therapeutic Exercises (89323) Resistance / level Sets/sec Reps Notes   UBE Level 1.5 1.5' FWD, 1.5' Bkwd     HS/Hip Flexor Stretch   30\"  X 2 each     Prone Press Up  3\"  X 10     Prone Hip Extension     If tolerated    Cat/Camel        Lumbar Ext over SB  Lumbar Sidebend over SB  10\"  5\"  X 10  X 5 B     Prayer Stretch       Supine 90/90 HS Stretch with sciatic nerve flossing   10\" X 10 B    Tra hooklying  1/2 foam W's against wall   1/2 foam scap squeezes  Horiz Abd on 1/2 foam  blue Lat stretch ballet bar   20\" x 3    Quadruped Alt Leg Ext   X 5 B           Therapeutic Activities (46495)              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 Education:  -patient educated on diagnosis, prognosis and expectations for rehab  -all patient questions were answered  2/4  Educated pt regarding dry needling, discussed contraindications with Bonita dry needling therapist, present. Talked to pt about discussing potential dry needling with his PCP, Dr Ana Danielson, who prescribes him the blood thinners, to get approval for the dry needling prior to proceeding at a future date. Dry Needling acknowledgement form given to patient this date.       Manual: 1/30: MET to correct R Pubic upslip, L4 NRRSL/ERSR, L5 FRSR, L3 FRSL, NAA MARIA sacral torsion, L SI anterior innominate SI, R SI posterior innominate, L5 flexion mobs grade II followed with KILO nuno Grade II L1-L5, sacral respiration mobs X15'  HEP instruction:  -patient provided with written and illustrated instructions for HEP (see scanned image in ): prone press up, prone on elbows, kneeling/standing hip flexor stretch, standing hamstring stretch, mid/high rows blue TB    Therapeutic Exercise and NMR EXR  [x] (12366) Provided verbal/tactile cueing for activities related to strengthening, flexibility, endurance, ROM for improvements in  [] LE / Lumbar: LE, proximal hip, and core control with self care, mobility, lifting, ambulation. [] UE / Cervical: cervical, postural, scapular, scapulothoracic and UE control with self care, reaching, carrying, lifting, house/yardwork, driving, computer work.  [] (62470) Provided verbal/tactile cueing for activities related to improving balance, coordination, kinesthetic sense, posture, motor skill, proprioception to assist with   [] LE / lumbar: LE, proximal hip, and core control in self care, mobility, lifting, ambulation and eccentric single leg control. [] UE / cervical: cervical, scapular, scapulothoracic and UE control with self care, reaching, carrying, lifting, house/yardwork, driving, computer work.   [] (56675) Therapist is in constant attendance of 2 or more patients providing skilled therapy interventions, but not providing any significant amount of measurable one-on-one time to either patient, for improvements in  [] LE / lumbar: LE, proximal hip, and core control in self care, mobility, lifting, ambulation and eccentric single leg control. [] UE / cervical: cervical, scapular, scapulothoracic and UE control with self care, reaching, carrying, lifting, house/yardwork, driving, computer work.      NMR and Therapeutic Activities:    [] (26639 or 26923) Provided verbal/tactile cueing for activities related to improving balance, coordination, kinesthetic sense, posture, motor skill, proprioception and motor activation to allow for proper function of   [] LE: / Lumbar core, proximal hip and LE with self care and ADLs  [] UE / Cervical: cervical, postural, scapular, scapulothoracic and UE control with self care, carrying, lifting, driving, computer work.   [] (28742) Gait Re-education- Provided training and instruction to the patient for proper LE, core and proximal hip recruitment and positioning and eccentric body weight control with ambulation re-education including up and down stairs     Home Management Training / Self Care:  [] (62085) Provided self-care/home management training related to activities of daily living and compensatory training, and/or use of adaptive equipment for improvement with: ADLs and compensatory training, meal preparation, safety procedures and instruction in use of adaptive equipment, including bathing, grooming, dressing, personal hygiene, basic household cleaning and chores. Home Exercise Program:    [x] (81095) Reviewed/Progressed HEP activities related to strengthening, flexibility, endurance, ROM of   [] LE / Lumbar: core, proximal hip and LE for functional self-care, mobility, lifting and ambulation/stair navigation   [] UE / Cervical: cervical, postural, scapular, scapulothoracic and UE control with self care, reaching, carrying, lifting, house/yardwork, driving, computer work  [] (49325)Reviewed/Progressed HEP activities related to improving balance, coordination, kinesthetic sense, posture, motor skill, proprioception of   [] LE: core, proximal hip and LE for self care, mobility, lifting, and ambulation/stair navigation    [] UE / Cervical: cervical, postural,  scapular, scapulothoracic and UE control with self care, reaching, carrying, lifting, house/yardwork, driving, computer work    Manual Treatments:  PROM / STM / Oscillations-Mobs:  G-I, II, III, IV (PA's, Inf., Post.)  [x] (37658) Provided manual therapy to mobilize LE, proximal hip and/or LS spine soft tissue/joints for the purpose of modulating pain, promoting relaxation,  increasing ROM, reducing/eliminating soft tissue swelling/inflammation/restriction, improving soft tissue extensibility and allowing for proper ROM for normal function with   [] LE / lumbar: self care, mobility, lifting and ambulation. [] UE / Cervical: self care, reaching, carrying, lifting, house/yardwork, driving, computer work.      Modalities:  [x] (14593) Vasopneumatic compression: Utilized vasopneumatic compression to decrease edema / swelling for the purpose of improving mobility and quad tone / recruitment which will allow for increased overall function including but not limited to self-care, transfers, ambulation, and ascending / descending stairs. Modalities:    2/7, 2/4, 1/30 E-stim IFC  to lumbar paraspinals with CP in prone position with Towel rolls under L/R legs X15'  1/28. Premod to mid scapula, lumbar spine with Cold packs with grey bolster under knees x 15 mins  1/23 E-stim IFC  to lumbar paraspinals with CP in supine position with bolster under knees x 15'        Charges:  Timed Code Treatment Minutes: 33   Total Treatment Minutes: 48     [] EVAL - LOW (76626)   [] EVAL - MOD (88840)  [] EVAL - HIGH (89204)  [] RE-EVAL (42515)  [x] ZA(30595) x 2      [] Ionto  [] NMR (86342) x       [] Vaso  [] Manual (10282) x  1     [] Ultrasound  [] TA x 2        [] Mech Traction (03907)  [] Aquatic Therapy x      [x] ES (un) (30115):   [] Home Management Training x  [] ES(attended) (60193)   [] Dry Needling 1-2 muscles (72794):  [] Dry Needling 3+ muscles (255720  [] Group:      [] Other:     GOALS: Short Term Goals: To be achieved in: 2 weeks  1. Independent in HEP and progression per patient tolerance, in order to prevent re-injury. [] Progressing: [] Met: [] Not Met: [] Adjusted  2. Patient will have a decrease in pain to facilitate improvement in movement, function, and ADLs as indicated by Functional Deficits. [] Progressing: [] Met: [] Not Met: [] Adjusted    Long Term Goals: To be achieved in: 5 weeks  2. Patient will demonstrate increased AROM to [employer] of thoracic/lumbar spine, including improving lumbar flexion to 80* and good LS mobility, good hip ROM to allow for proper joint functioning as indicated by patients Functional Deficits. [] Progressing: [] Met: [] Not Met: [] Adjusted  3. Patient will demonstrate an increase in postural awareness and control and activation of deep lumbar stabilizers to allow for proper functional mobility as indicated by patients Functional Deficits.     [] better tolerance initially this visit, but then increased spasms and guarding in lower lumbar spine by end of session. Still has difficulty with TrA/multifidi activation. 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 Would continue to benefit from PT services for manual therapy to decrease muscle spasms/guarding, and progress to lumbar stabilization program.      Treatment/Activity Tolerance:  [] Patient able to complete tx [] Patient limited by fatigue  [x] Patient limited by pain  [] Patient limited by other medical complications  [x] Other:  Poor tolerance overall, guarded with all activities this date     Prognosis: [] Good [] Fair  [] Poor    Patient Requires Follow-up: [x] Yes  [] No    Plan for next treatment session:    PLAN: See karin. PT 2x / week for 5weeks. [x] Continue per plan of care [] Alter current plan (see comments)  [] Plan of care initiated [] Hold pending MD visit [] Discharge    Electronically signed by: Marianela Sahu PT    Note: If patient does not return for scheduled/ recommended follow up visits, his note will serve as a discharge from care along with most recent update on progress.

## 2020-02-10 ENCOUNTER — PATIENT MESSAGE (OUTPATIENT)
Dept: INTERNAL MEDICINE CLINIC | Age: 45
End: 2020-02-10

## 2020-02-11 ENCOUNTER — APPOINTMENT (OUTPATIENT)
Dept: PHYSICAL THERAPY | Age: 45
End: 2020-02-11
Payer: COMMERCIAL

## 2020-02-11 ENCOUNTER — HOSPITAL ENCOUNTER (OUTPATIENT)
Dept: PHYSICAL THERAPY | Age: 45
Setting detail: THERAPIES SERIES
Discharge: HOME | End: 2020-02-11
Payer: COMMERCIAL

## 2020-02-11 PROCEDURE — 97110 THERAPEUTIC EXERCISES: CPT

## 2020-02-11 PROCEDURE — 97140 MANUAL THERAPY 1/> REGIONS: CPT

## 2020-02-11 NOTE — FLOWSHEET NOTE
patient for proper LE, core and proximal hip recruitment and positioning and eccentric body weight control with ambulation re-education including up and down stairs     Home Management Training / Self Care:  [] (57480) Provided self-care/home management training related to activities of daily living and compensatory training, and/or use of adaptive equipment for improvement with: ADLs and compensatory training, meal preparation, safety procedures and instruction in use of adaptive equipment, including bathing, grooming, dressing, personal hygiene, basic household cleaning and chores. Home Exercise Program:    [x] (58048) Reviewed/Progressed HEP activities related to strengthening, flexibility, endurance, ROM of   [] LE / Lumbar: core, proximal hip and LE for functional self-care, mobility, lifting and ambulation/stair navigation   [] UE / Cervical: cervical, postural, scapular, scapulothoracic and UE control with self care, reaching, carrying, lifting, house/yardwork, driving, computer work  [] (13926)Reviewed/Progressed HEP activities related to improving balance, coordination, kinesthetic sense, posture, motor skill, proprioception of   [] LE: core, proximal hip and LE for self care, mobility, lifting, and ambulation/stair navigation    [] UE / Cervical: cervical, postural,  scapular, scapulothoracic and UE control with self care, reaching, carrying, lifting, house/yardwork, driving, computer work    Manual Treatments:  PROM / STM / Oscillations-Mobs:  G-I, II, III, IV (PA's, Inf., Post.)  [x] (06080) Provided manual therapy to mobilize LE, proximal hip and/or LS spine soft tissue/joints for the purpose of modulating pain, promoting relaxation,  increasing ROM, reducing/eliminating soft tissue swelling/inflammation/restriction, improving soft tissue extensibility and allowing for proper ROM for normal function with   [] LE / lumbar: self care, mobility, lifting and ambulation.     [x] UE / Cervical: self care, reaching, carrying, lifting, house/yardwork, driving, computer work. Modalities:  [] (95545) Vasopneumatic compression: Utilized vasopneumatic compression to decrease edema / swelling for the purpose of improving mobility and quad tone / recruitment which will allow for increased overall function including but not limited to self-care, transfers, ambulation, and ascending / descending stairs. Modalities:    2/11: MHP to upper back in prone x 15 min   2/7, 2/4, 1/30 E-stim IFC  to lumbar paraspinals with CP in prone position with Towel rolls under L/R legs X15'  1/28. Premod to mid scapula, lumbar spine with Cold packs with grey bolster under knees x 15 mins  1/23 E-stim IFC  to lumbar paraspinals with CP in supine position with bolster under knees x 15'        Charges:  Timed Code Treatment Minutes: 29   Total Treatment Minutes: 44     [] EVAL - LOW (19948)   [] EVAL - MOD (31313)  [] EVAL - HIGH (33876)  [] RE-EVAL (83072)  [x] LX(76035) x 1      [] Ionto  [] NMR (80770) x       [] Vaso  [x] Manual (27364) x  1     [] Ultrasound  [] TA x         [] Mech Traction (94654)  [] Aquatic Therapy x      [] ES (un) (91330):   [] Home Management Training x  [] ES(attended) (38060)   [] Dry Needling 1-2 muscles (74159):  [] Dry Needling 3+ muscles (500790  [] Group:      [] Other:     GOALS: Short Term Goals: To be achieved in: 2 weeks  1. Independent in HEP and progression per patient tolerance, in order to prevent re-injury. [] Progressing: [] Met: [] Not Met: [] Adjusted  2. Patient will have a decrease in pain to facilitate improvement in movement, function, and ADLs as indicated by Functional Deficits. [] Progressing: [] Met: [] Not Met: [] Adjusted    Long Term Goals: To be achieved in: 5 weeks  2.  Patient will demonstrate increased [facility] to [facility] of thoracic/lumbar spine, including improving lumbar flexion to 80* and good LS mobility, good hip ROM to allow for proper joint functioning as

## 2020-02-12 ENCOUNTER — PATIENT MESSAGE (OUTPATIENT)
Dept: INTERNAL MEDICINE CLINIC | Age: 45
End: 2020-02-12

## 2020-02-13 ENCOUNTER — OFFICE VISIT (OUTPATIENT)
Dept: INTERNAL MEDICINE CLINIC | Age: 45
End: 2020-02-13
Payer: COMMERCIAL

## 2020-02-13 ENCOUNTER — HOSPITAL ENCOUNTER (OUTPATIENT)
Dept: PHYSICAL THERAPY | Age: 45
Setting detail: THERAPIES SERIES
Discharge: HOME | End: 2020-02-13
Payer: COMMERCIAL

## 2020-02-13 ENCOUNTER — APPOINTMENT (OUTPATIENT)
Dept: PHYSICAL THERAPY | Age: 45
End: 2020-02-13
Payer: COMMERCIAL

## 2020-02-13 VITALS
SYSTOLIC BLOOD PRESSURE: 124 MMHG | BODY MASS INDEX: 28.43 KG/M2 | OXYGEN SATURATION: 96 % | WEIGHT: 246 LBS | HEART RATE: 87 BPM | DIASTOLIC BLOOD PRESSURE: 82 MMHG

## 2020-02-13 PROCEDURE — 99214 OFFICE O/P EST MOD 30 MIN: CPT | Performed by: INTERNAL MEDICINE

## 2020-02-13 PROCEDURE — G8427 DOCREV CUR MEDS BY ELIG CLIN: HCPCS | Performed by: INTERNAL MEDICINE

## 2020-02-13 PROCEDURE — G0283 ELEC STIM OTHER THAN WOUND: HCPCS

## 2020-02-13 PROCEDURE — G8417 CALC BMI ABV UP PARAM F/U: HCPCS | Performed by: INTERNAL MEDICINE

## 2020-02-13 PROCEDURE — G8484 FLU IMMUNIZE NO ADMIN: HCPCS | Performed by: INTERNAL MEDICINE

## 2020-02-13 PROCEDURE — 97110 THERAPEUTIC EXERCISES: CPT

## 2020-02-13 PROCEDURE — 1036F TOBACCO NON-USER: CPT | Performed by: INTERNAL MEDICINE

## 2020-02-13 PROCEDURE — 97140 MANUAL THERAPY 1/> REGIONS: CPT

## 2020-02-13 RX ORDER — PREDNISONE 10 MG/1
TABLET ORAL
Qty: 40 TABLET | Refills: 0 | Status: SHIPPED | OUTPATIENT
Start: 2020-02-13 | End: 2020-02-29

## 2020-02-13 ASSESSMENT — ENCOUNTER SYMPTOMS: BACK PAIN: 1

## 2020-02-13 NOTE — FLOWSHEET NOTE
[facility]  Phone: [phone]   Fax: [phone]    Physical Therapy Daily Treatment Note  Date:  2020    Patient Name:  Lesvia Powers    :  1975  MRN: [id number]  Medical/Treatment Diagnosis Information:  · Diagnosis: Thoracic/Lumbar DDD M51.36, M51.34  · Treatment Diagnosis: Muscle guarding thoracic/lumbar, tight hip flexors/hamstrings, decreased core activation, poor sitting/standing tolerance, inability to work   Insurance/Certification information:  PT Insurance Information: [facility] 40 visits/year  Physician Information:  Referring Practitioner: [address] [place] [address] signed (Y/N): [x]  Yes []  No     Date of Patient follow up with Physician:      Progress Report: []  Yes  [x]  No     Date Range for reporting period:  Beginning    Ending    Progress report due (10 Rx/or 30 days whichever is less):     Recertification due (POC duration/ or 90 days whichever is less):    Visit # Insurance Allowable Auth required? Date Range   7/10 40 []  Yes  [x]  No      Latex Allergy:  [x]NO      []YES  Preferred Language for Healthcare:   [x]English       []other:    Functional Scale:      Pain level: >7/10    SUBJECTIVE:  Pt saw Dr Bucky Oliver today and he's being referred to a specialist.  Hugo Netters that he didn't recommend the dry needling because of risk of bruising. Pt states that he did not return to work because he was having some pains and didn't think he should go back to work, didn't feel right. Now is off work till sometime at the end of March. Even though he's ready to go back to work. Currently don't feel comfortable even bending down to pick anything up.           OBJECTIVE:      RESTRICTIONS/PRECAUTIONS:  Pt on blood thinners (may be contraindication for dry needling)    Exercises/Interventions:     Therapeutic Exercises (81430) Resistance / level Sets/sec Reps Notes   UBE Level 1.5 1.5' FWD, 1.5' Bkwd     HS Stretch   30\"  X 2 each Prone Press Up     Prone Hip Extension     If tolerated    Cat/Camel        Lumbar Ext over SB  Lumbar Sidebend over SB  10\"  10\"  X 10  X 5 B     Prayer Stretch       Supine 90/90 HS Stretch with sciatic nerve flossing      Tra hooklying  1/2 foam W's against wall   1/2 foam scap squeezes  X 10  X 10 Horiz Abd on 1/2 foam  blue Lat stretch ballet bar       Quadruped Alt Leg Ext       Ext over 1/2 foam roll in supine 4 areas 10 sec ea  UEs in flexion    Theracane self use  3 min     LS stretch attempted but caused muscle spasm in mid back       Quadruped thoracic rotation, full range    5\" x 5 B    Sidelying open book thoracic rotation    5\" x 5 B     Lawnmowers  15#  X 10                   Therapeutic Activities (24616)              Functional lifting mechanics/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 Education:  -patient educated on diagnosis, prognosis and expectations for rehab  -all patient questions were answered  2/4  Educated pt regarding dry needling, discussed contraindications with [place] dry needling therapist, present. Talked to pt about discussing potential dry needling with his PCP, Dr Willis Ellis, who prescribes him the blood thinners, to get approval for the dry needling prior to proceeding at a future date. Dry Needling acknowledgement form given to patient this date.     2/11: Discussed with pt purchasing a theracane for home use     Manual: 1/30: MET to correct R Pubic upslip, L4 NRRSL/ERSR, L5 FRSR, L3 FRSL, ANA MARIA sacral torsion, L SI anterior innominate SI, R SI skill, proprioception and motor activation to allow for proper function of   [] LE: / Lumbar core, proximal hip and LE with self care and ADLs  [] UE / Cervical: cervical, postural, scapular, scapulothoracic and UE control with self care, carrying, lifting, driving, computer work.   [] (36537) Gait Re-education- Provided training and instruction to the patient for proper LE, core and proximal hip recruitment and positioning and eccentric body weight control with ambulation re-education including up and down stairs     Home Management Training / Self Care:  [] (36290) Provided self-care/home management training related to activities of daily living and compensatory training, and/or use of adaptive equipment for improvement with: ADLs and compensatory training, meal preparation, safety procedures and instruction in use of adaptive equipment, including bathing, grooming, dressing, personal hygiene, basic household cleaning and chores.      Home Exercise Program:    [x] (29723) Reviewed/Progressed HEP activities related to strengthening, flexibility, endurance, ROM of   [] LE / Lumbar: core, proximal hip and LE for functional self-care, mobility, lifting and ambulation/stair navigation   [] UE / Cervical: cervical, postural, scapular, scapulothoracic and UE control with self care, reaching, carrying, lifting, house/yardwork, driving, computer work  [] (22182)Reviewed/Progressed HEP activities related to improving balance, coordination, kinesthetic sense, posture, motor skill, proprioception of   [] LE: core, proximal hip and LE for self care, mobility, lifting, and ambulation/stair navigation    [] UE / Cervical: cervical, postural,  scapular, scapulothoracic and UE control with self care, reaching, carrying, lifting, house/yardwork, driving, computer work    Manual Treatments:  PROM / STM / Oscillations-Mobs:  G-I, II, III, IV (PA's, Inf., Post.)  [x] (43019) Provided manual therapy to mobilize LE, proximal hip and/or facilitate improvement in movement, function, and ADLs as indicated by Functional Deficits. [] Progressing: [] Met: [] Not Met: [] Adjusted    Long Term Goals: To be achieved in: 5 weeks  2. Patient will demonstrate increased AROM to [employer] of thoracic/lumbar spine, including improving lumbar flexion to 80* and good LS mobility, good hip ROM to allow for proper joint functioning as indicated by patients Functional Deficits. [] Progressing: [] Met: [] Not Met: [] Adjusted  3. Patient will demonstrate an increase in postural awareness and control and activation of deep lumbar stabilizers to allow for proper functional mobility as indicated by patients Functional Deficits. [] Progressing: [] Met: [] Not Met: [] Adjusted  4. Patient will demonstrate an increase in Strength to good proximal hip and core activation to allow for proper functional mobility as indicated by patients Functional Deficits. [] Progressing: [] Met: [] Not Met: [] Adjusted  5. Patient will return to functional activities including standing for > 1 hour  without increased symptoms or restriction. [] Progressing: [] Met: [] Not Met: [] Adjusted  6. 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 [x] Progression is slowed due to complexities/Impairments listed. [] Progression has been slowed due to co-morbidities.   [] Plan just implemented, too soon to assess goals progression <30days   [] Goals require adjustment due to lack of progress  [] Patient is not progressing as expected and requires additional follow up with physician  [] Other    Persisting Functional

## 2020-02-18 ENCOUNTER — APPOINTMENT (OUTPATIENT)
Dept: PHYSICAL THERAPY | Age: 45
End: 2020-02-18
Payer: COMMERCIAL

## 2020-02-19 ENCOUNTER — OFFICE VISIT (OUTPATIENT)
Dept: ORTHOPEDIC SURGERY | Age: 45
End: 2020-02-19
Payer: COMMERCIAL

## 2020-02-19 VITALS
DIASTOLIC BLOOD PRESSURE: 79 MMHG | HEART RATE: 60 BPM | BODY MASS INDEX: 28.47 KG/M2 | HEIGHT: 78 IN | WEIGHT: 246.03 LBS | SYSTOLIC BLOOD PRESSURE: 120 MMHG

## 2020-02-19 PROCEDURE — 99204 OFFICE O/P NEW MOD 45 MIN: CPT | Performed by: PHYSICAL MEDICINE & REHABILITATION

## 2020-02-19 PROCEDURE — G8427 DOCREV CUR MEDS BY ELIG CLIN: HCPCS | Performed by: PHYSICAL MEDICINE & REHABILITATION

## 2020-02-19 PROCEDURE — G8484 FLU IMMUNIZE NO ADMIN: HCPCS | Performed by: PHYSICAL MEDICINE & REHABILITATION

## 2020-02-19 PROCEDURE — G8417 CALC BMI ABV UP PARAM F/U: HCPCS | Performed by: PHYSICAL MEDICINE & REHABILITATION

## 2020-02-19 NOTE — PROGRESS NOTES
New Patient: SPINE    Referring Provider:  Jimmie Finch, *    Chief Complaint   Patient presents with    Lower Back Pain     NP, LSP       HISTORY OF PRESENT ILLNESS:      · The patient is being sent at the request of Jimmie Finch, * in consultation as a new spine patient for low back pain. The patient is a 38 y.o. male whom reports symptoms for 2-3 months. Symptoms progressed over the last  1 month. Patient reports there was not a significant event to cause the symptoms. Today discomfort is report at 6 out of 10, describing it as sharp, throbbing, piercing, stabbing, tightness. Symptoms are aggravated by: standing, sitting, bending, driving. Patient has undergone recent treatment including, physical therapy, chiropractor treatments and oral medications. Patient denies previous lumbar spine surgery. · Mr. Perez presents today for evaluation of his ongoing low back pain. He states no lower extremity pain or symptoms at this time but is complaining of mid and upper back pain. He states pain first began in his upper and mid back a few months ago with no specific injury or cause. He notes over the past 1 month that his symptoms began to progress into his low back, which is his main complaint today. He states overall his biggest aggravating factors are with standing, sitting and bending. The patient notes the longer that he stands, his legs become to feel weak and heavy. He also notes issues with driving at times. He has been doing some physical therapy recently, so far completing 7 visits. He has also had oral medications including prednisone and flexeril. He does not feel any of his current treatment has been helpful. The patient also states prior tos starting physical therapy and getting medications, he was seeking treatment at a chiropractor. Again, he denies any relief with this.   He does not present today using any ambulatory devices or braces to assist him with his current [employer] HERNIA REPAIR  2009     Current Medications:     Current Outpatient Medications:     predniSONE (DELTASONE) 10 MG tablet, Take 4 tablets by mouth daily for 4 days, THEN 3 tablets daily for 4 days, THEN 2 tablets daily for 4 days, THEN 1 tablet daily for 4 days. , Disp: 40 tablet, Rfl: 0    citalopram (CELEXA) 20 MG tablet, Take 1 tablet by mouth daily, Disp: 90 tablet, Rfl: 3    apixaban (ELIQUIS) 5 MG TABS tablet, TAKE 1 TABLET BY MOUTH TWICE DAILY, Disp: 60 tablet, Rfl: 5    valACYclovir (VALTREX) 1 g tablet, Take 1 tablet by mouth daily, Disp: 30 tablet, Rfl: 11    fluticasone-vilanterol (BREO ELLIPTA) 100-25 MCG/INH AEPB inhaler, Inhale 1 puff into the lungs daily, Disp: 60 each, Rfl: 11    albuterol sulfate HFA (VENTOLIN HFA) 108 (90 Base) MCG/ACT inhaler, Inhale 2 puffs into the lungs every 6 hours as needed for Wheezing, Disp: 1 Inhaler, Rfl: 3  Allergies:  Patient has no known allergies. Social History:    reports that he quit smoking about 4 years ago. His smoking use included cigarettes. He has a 12.50 pack-year smoking history. He has never used smokeless tobacco. He reports current alcohol use. He reports that he does not use drugs. Family History:   Family History   Problem Relation Age of Onset    Diabetes Mother     Clotting Disorder Maternal Grandfather     Diabetes Sister        REVIEW OF SYSTEMS: ROS - 14 point    Constitutional: No fevers, chills, night sweats, unexplained weight loss  Eye: No vision changes or diplopia  ENT: No nasal congestion, postnasal drip or sore throat.  No tinnitus, + sinus issues  Respiratory: No cough or SOB  CV: No chest pain or palpitations  GI: No nausea, abdominal pain, stool changes  : No dysuria or hematuria, + frequency  Skin: No new or changing skin lesions, no rashes  MSK: No joint swelling, morning stiffness, unusual joint pain  Neurological: + Headache/dizziness, confusion, syncope  Psychiatric: There are no rashes, ulcerations or lesions. Strength and tone are normal. No atrophy or abnormal movements are noted. Diagnostic Testing:    Xrays:   Lumbar spine [facility] show L2-L5 mild DDD  MRI or CT:  None  EMG:  None  Results for orders placed or performed in visit on 19   C.trachomatis N.gonorrhoeae DNA, Urine   Result Value Ref Range    C. trachomatis DNA ,Urine Negative Negative    N. gonorrhoeae DNA, Urine Negative Negative   Urine Culture   Result Value Ref Range    Urine Culture, Routine No growth at 18-36 hours    Syphilis Antibody Cascading Reflex   Result Value Ref Range    Total Syphillis IgG/IgM Non-Reactive Non-reactive   HIV-1, Quantitative, Molecular   Result Value Ref Range    HIV-1 QNT, IU/ML Not Detected cpy/mL    HIV-1 QNT Log, IU/ML Not Detected log cpy/mL    Interpretation Not Detected Not Detected       Impression (Medical Decision Making):       1. Degenerative disc disease, lumbar    2. Lumbar foraminal stenosis    3. Spondylosis without myelopathy or radiculopathy, lumbar region    4. Cervical strain, initial encounter        Plan (Medical Decision Making):    I discussed the diagnosis and the treatment options with Tomy Perez today. In Summary:  The various treatment options were outlined and discussed with Tomy Perez including:  Conservative care options: physical therapy, ice, medications, bracing, and activity modification. The indications for therapeutic injections. The indications for additional imaging/laboratory studies. The indications for (possible future) interventions. After considering the various options discussed, Tomy Perez elected to pursue a course of treatment that includes the followin. Medications: Continue anti-inflammatories with appropriate GI Precautions including to stop if develop dark tarry stools or GI upset and to take with food.     2. PT:  Encouraged to continue with Home exercise program.    3. Further studies: Setup Lumbar MR without contrast to evaluate for soft tissue pathology or stenosis contributing to the back pain and paresthesia. The patient has failed a six week trial of a HEP program within the last 3 months. 4. Interventional:  After further imaging is obtained, interventional options will be reviewed and recommended. 5. Healthy Lifestyle Measures:  Patient education material reviewing the following was distributed to [employer] Chris  Anatomic drawings  Healthy lifestyle education  Osteoporosis prevention,   Back and neck pain educational information   Advanced imaging preparedness    Posture education   Proper lifting and carrying techniques,   Weight management  Quitting smoking and   Minor ways to treat back pain  For further information regarding the spine conditions and to review interventional treatments the patient was directed to [employer].    6.  Follow up:  1-2 weeks    Tomy Perez was instructed to call the office if his symptoms worsen or if new symptoms appear prior to the next scheduled visit. He is specifically instructed to contact the office between now & his scheduled appointment if he has concerns related to his condition or if he needs assistance in scheduling the above tests. He is welcome to call for an appointment sooner if he has any additional concerns or questions. Myra STAPLETON ATC, am scribing for and in the presence of Dr. Magdaleno Partida.  02/19/20 3:07 PM Myra Cuenca. The physical examination was performed between the patient and Dr. Magdaleno Partida. All counseling during the appointment was performed between the patient and the provider. Dr. Magdaleno STAPLETON, personally performed the services described in this documentation as scribed by Myra Harrington ATC in my presence and it is both accurate and complete. Oscar Le.  Lluvia Vidal MD, DANYELL, [facility]  Board Certified in [address] [facility]  Board Certified and Fellowship Trained in [facility] ([facility])     This dictation was performed with a verbal recognition program [facility] ) and it was checked for errors. It is possible that there are still dictated errors within this office note. If so, please bring any errors to my attention for an addendum. All efforts were made to ensure that this office note is accurate.

## 2020-02-20 ENCOUNTER — TELEPHONE (OUTPATIENT)
Dept: ORTHOPEDIC SURGERY | Age: 45
End: 2020-02-20

## 2020-02-20 ENCOUNTER — APPOINTMENT (OUTPATIENT)
Dept: PHYSICAL THERAPY | Age: 45
End: 2020-02-20
Payer: COMMERCIAL

## 2020-02-21 ENCOUNTER — HOSPITAL ENCOUNTER (OUTPATIENT)
Dept: PHYSICAL THERAPY | Age: 45
Setting detail: THERAPIES SERIES
Discharge: HOME | End: 2020-02-21
Payer: COMMERCIAL

## 2020-02-21 PROCEDURE — 97110 THERAPEUTIC EXERCISES: CPT

## 2020-02-21 NOTE — FLOWSHEET NOTE
[facility] Physical Therapy  Phone: [phone]   Fax: [phone]    Physical Therapy Daily Treatment Note  Date:  2020    Patient Name:  Flores Rodriguez    :  1975  MRN: [id number]  Medical/Treatment Diagnosis Information:  · Diagnosis: Thoracic/Lumbar DDD M51.36, M51.34  · Treatment Diagnosis: Muscle guarding thoracic/lumbar, tight hip flexors/hamstrings, decreased core activation, poor sitting/standing tolerance, inability to work   Insurance/Certification information:  PT Insurance Information: [facility] 40 visits/year  Physician Information:  Referring Practitioner: [address] [place] [address] signed (Y/N): [x]  Yes []  No     Date of Patient follow up with Physician:      Progress Report: []  Yes  [x]  No     Date Range for reporting period:  Beginning    Ending    Progress report due (10 Rx/or 30 days whichever is less):     Recertification due (POC duration/ or 90 days whichever is less):    Visit # Insurance Allowable Auth required? Date Range   8/10 40 []  Yes  [x]  No      Latex Allergy:  [x]NO      []YES  Preferred Language for Healthcare:   [x]English       []other:    Functional Scale:      Pain level: 6-7/10    SUBJECTIVE:  Pt reports his lower back is hurting him today and he has increased stiffness overall. He has really been working on his stretching at home. Pt has an appointment Monday with an orthopedic specialist.     OBJECTIVE:   pt discussed dry needling with Dr. Issac Cross.  Dr. Issac Cross did not approve dry needling    RESTRICTIONS/PRECAUTIONS:  Pt on blood thinners (may be contraindication for dry needling)    Exercises/Interventions:     Therapeutic Exercises (18754) Resistance / level Sets/sec Reps Notes   UBE Level 1.5 1.5' FWD, 1.5' Bkwd     HS Stretch   30\"  X 2 each     Prone Press Up     Prone Hip Extension     If tolerated    Cat/Camel        Lumbar Ext over SB  Lumbar Sidebend over SB  10\"  10\"  X 10  X 5 B     Prayer Stretch innominate SI, R SI posterior innominate, L5 flexion mobs grade II followed with KILO nuno Grade II L1-L5, sacral respiration mobs X15'  HEP instruction:  -patient provided with written and illustrated instructions for HEP (see scanned image in ): prone press up, prone on elbows, kneeling/standing hip flexor stretch, standing hamstring stretch, mid/high rows blue TB    Therapeutic Exercise and NMR EXR  [x] (90373) Provided verbal/tactile cueing for activities related to strengthening, flexibility, endurance, ROM for improvements in  [x] LE / Lumbar: LE, proximal hip, and core control with self care, mobility, lifting, ambulation. [x] UE / Cervical: cervical, postural, scapular, scapulothoracic and UE control with self care, reaching, carrying, lifting, house/yardwork, driving, computer work.  [] (43491) Provided verbal/tactile cueing for activities related to improving balance, coordination, kinesthetic sense, posture, motor skill, proprioception to assist with   [] LE / lumbar: LE, proximal hip, and core control in self care, mobility, lifting, ambulation and eccentric single leg control. [] UE / cervical: cervical, scapular, scapulothoracic and UE control with self care, reaching, carrying, lifting, house/yardwork, driving, computer work.   [] (45339) Therapist is in constant attendance of 2 or more patients providing skilled therapy interventions, but not providing any significant amount of measurable one-on-one time to either patient, for improvements in  [] LE / lumbar: LE, proximal hip, and core control in self care, mobility, lifting, ambulation and eccentric single leg control. [] UE / cervical: cervical, scapular, scapulothoracic and UE control with self care, reaching, carrying, lifting, house/yardwork, driving, computer work.      NMR and Therapeutic Activities:    [] (69766 or 54985) Provided verbal/tactile cueing for activities related to improving balance, coordination, kinesthetic sense, posture, motor skill, proprioception and motor activation to allow for proper function of   [] LE: / Lumbar core, proximal hip and LE with self care and ADLs  [] UE / Cervical: cervical, postural, scapular, scapulothoracic and UE control with self care, carrying, lifting, driving, computer work.   [] (84306) Gait Re-education- Provided training and instruction to the patient for proper LE, core and proximal hip recruitment and positioning and eccentric body weight control with ambulation re-education including up and down stairs     Home Management Training / Self Care:  [] (88565) Provided self-care/home management training related to activities of daily living and compensatory training, and/or use of adaptive equipment for improvement with: ADLs and compensatory training, meal preparation, safety procedures and instruction in use of adaptive equipment, including bathing, grooming, dressing, personal hygiene, basic household cleaning and chores.      Home Exercise Program:    [x] (91086) Reviewed/Progressed HEP activities related to strengthening, flexibility, endurance, ROM of   [] LE / Lumbar: core, proximal hip and LE for functional self-care, mobility, lifting and ambulation/stair navigation   [] UE / Cervical: cervical, postural, scapular, scapulothoracic and UE control with self care, reaching, carrying, lifting, house/yardwork, driving, computer work  [] (66621)Reviewed/Progressed HEP activities related to improving balance, coordination, kinesthetic sense, posture, motor skill, proprioception of   [] LE: core, proximal hip and LE for self care, mobility, lifting, and ambulation/stair navigation    [] UE / Cervical: cervical, postural,  scapular, scapulothoracic and UE control with self care, reaching, carrying, lifting, house/yardwork, driving, computer work    Manual Treatments:  PROM / STM / Oscillations-Mobs:  G-I, II, III, IV (PA's, Inf., Post.)  [x] (00867) Provided manual therapy to mobilize LE, proximal hip and/or LS spine soft tissue/joints for the purpose of modulating pain, promoting relaxation,  increasing ROM, reducing/eliminating soft tissue swelling/inflammation/restriction, improving soft tissue extensibility and allowing for proper ROM for normal function with   [] LE / lumbar: self care, mobility, lifting and ambulation. [] UE / Cervical: self care, reaching, carrying, lifting, house/yardwork, driving, computer work. Modalities:  [] (29064) Vasopneumatic compression: Utilized vasopneumatic compression to decrease edema / swelling for the purpose of improving mobility and quad tone / recruitment which will allow for increased overall function including but not limited to self-care, transfers, ambulation, and ascending / descending stairs. Modalities:    2/21: MHP to upper and lower back in supine x 15 min   2/11: MHP to upper back in prone x 15 min   2/13, with hot pack in prone 2/7, 2/4, 1/30 E-stim IFC  to lumbar paraspinals with CP in prone position with Towel rolls under L/R legs X15'  1/28. Premod to mid scapula, lumbar spine with Cold packs with grey bolster under knees x 15 mins  1/23 E-stim IFC  to lumbar paraspinals with CP in supine position with bolster under knees x 15'        Charges:  Timed Code Treatment Minutes: 27   Total Treatment Minutes: 42     [] EVAL - LOW (70014)   [] EVAL - MOD (56595)  [] EVAL - HIGH (08174)  [] RE-EVAL (80579)  [x] BS(51238) x 2      [] Ionto  [] NMR (06985) x       [] Vaso  [] Manual (20294) x       [] Ultrasound  [] TA x         [] Mech Traction (61993)  [] Aquatic Therapy x      [] ES (un) (94948):   [] Home Management Training x  [] ES(attended) (59338)   [] Dry Needling 1-2 muscles (06938):  [] Dry Needling 3+ muscles (961047  [] Group:      [] Other:     GOALS: Short Term Goals: To be achieved in: 2 weeks  1. Independent in HEP and progression per patient tolerance, in order to prevent re-injury.    [] Progressing: [] Met: [] Not Met: [] Adjusted  2. Patient will have a decrease in pain to facilitate improvement in movement, function, and ADLs as indicated by Functional Deficits. [] Progressing: [] Met: [] Not Met: [] Adjusted    Long Term Goals: To be achieved in: 5 weeks  2. Patient will demonstrate increased AROM to [facility] of thoracic/lumbar spine, including improving lumbar flexion to 80* and good LS mobility, good hip ROM to allow for proper joint functioning as indicated by patients Functional Deficits. [] Progressing: [] Met: [] Not Met: [] Adjusted  3. Patient will demonstrate an increase in postural awareness and control and activation of deep lumbar stabilizers to allow for proper functional mobility as indicated by patients Functional Deficits. [] Progressing: [] Met: [] Not Met: [] Adjusted  4. Patient will demonstrate an increase in Strength to good proximal hip and core activation to allow for proper functional mobility as indicated by patients Functional Deficits. [] Progressing: [] Met: [] Not Met: [] Adjusted  5. Patient will return to functional activities including standing for > 1 hour  without increased symptoms or restriction. [] Progressing: [] Met: [] Not Met: [] Adjusted  6. 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 [x] Progression is slowed due to complexities/Impairments listed. [] Progression has been slowed due to co-morbidities.   [] Plan just implemented, too soon to assess goals progression <30days   [] Goals require adjustment due to lack of progress  [] Patient is not progressing as expected and requires additional follow up with physician  [] Other    Persisting Functional

## 2020-02-22 ENCOUNTER — HOSPITAL ENCOUNTER (OUTPATIENT)
Dept: PHYSICAL THERAPY | Age: 45
Setting detail: THERAPIES SERIES
Discharge: HOME | End: 2020-02-22
Payer: COMMERCIAL

## 2020-02-22 PROCEDURE — 97112 NEUROMUSCULAR REEDUCATION: CPT

## 2020-02-22 PROCEDURE — G0283 ELEC STIM OTHER THAN WOUND: HCPCS

## 2020-02-22 PROCEDURE — 97140 MANUAL THERAPY 1/> REGIONS: CPT

## 2020-02-22 PROCEDURE — 97110 THERAPEUTIC EXERCISES: CPT

## 2020-02-22 NOTE — FLOWSHEET NOTE
[address] [facility] Physical Therapy  Phone: [phone]   Fax: [phone]    Physical Therapy Daily Treatment Note  Date:  2020    Patient Name:  Jerri Burgess    :  1975  MRN: [id number]  Medical/Treatment Diagnosis Information:  · Diagnosis: Thoracic/Lumbar DDD M51.36, M51.34  · Treatment Diagnosis: Muscle guarding thoracic/lumbar, tight hip flexors/hamstrings, decreased core activation, poor sitting/standing tolerance, inability to work   Insurance/Certification information:  PT Insurance Information: [facility] 40 visits/year  Physician Information:  Referring Practitioner: [address] [place] [address] signed (Y/N): [x]  Yes []  No     Date of Patient follow up with Physician:      Progress Report: []  Yes  [x]  No     Date Range for reporting period:  Beginning    Ending    Progress report due (10 Rx/or 30 days whichever is less):     Recertification due (POC duration/ or 90 days whichever is less):    Visit # Insurance Allowable Auth required? Date Range   9/10 40 []  Yes  [x]  No      Latex Allergy:  [x]NO      []YES  Preferred Language for Healthcare:   [x]English       []other:    Functional Scale:      Pain level:  6 /10 R-upper lumbar; 4/10 L interscap    SUBJECTIVE:  Pt reports he is scheduled for MRI Monday. He states he is usually in more pain when he leaves PT. Still off work as a . Sweeping, mopping , walking the dog can increase pain. The more activity the worse the pain is. Continues to have muscle spasms in paraspinals    OBJECTIVE:   pt discussed dry needling with Dr. Kaitlin Lopez.  Dr. Kaitlin Lopez did not approve dry needling    RESTRICTIONS/PRECAUTIONS:  Pt on blood thinners (may be contraindication for dry needling)    Exercises/Interventions:     Therapeutic Exercises (33085) x 20' Resistance / level Sets/sec Reps Notes   UBE Level 1 1.5' FWD, 1.5' Bkwd     HS Stretch      Prone Press Up     Prone Hip Extension     If tolerated    TBand mid row blue 2 10   2/22   TB LPD blue 1 10 2/22   Cat/Camel        Lumbar Ext over SB against wall  Lumbar Sidebend over SB against wall  5\"  2\" X 10  X 10 B  2/22: decreased hold time d/t muscle spasm   Prayer Stretch       Supine 90/90 HS Stretch with sciatic nerve flossing      Tra hooklying  1/2 foam W's against wall   1/2 foam scap squeezes at wall     X 10  Horiz Abd on 1/2 foam   X 10Lat stretch ballet bar       Quadruped Alt Leg Ext       Ext over 1/2 foam roll in supine  2/21: attempted but too painful    Theracane self use       LS stretch attempted but caused muscle spasm in mid back       Quadruped thoracic rotation, full range        Sidelying open book thoracic rotation    3\" x 10 B     Lawnmowers     LEONARD with hip extension  1 10 B    Prone supermans     Prone swimmers                   Therapeutic Activities (20199)              Functional lifting mechanics/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 Education:  -patient educated on diagnosis, prognosis and expectations for rehab  -all patient questions were answered  2/4  Educated pt regarding dry needling, discussed contraindications with [place] dry needling therapist, present. Talked to pt about discussing potential dry needling with his PCP, Dr Nan Galvan, who prescribes him the blood thinners, to get approval for the dry needling prior to proceeding at a future date. Dry Needling acknowledgement form given to patient this date.     2/11: care, mobility, lifting, ambulation and eccentric single leg control. [] UE / cervical: cervical, scapular, scapulothoracic and UE control with self care, reaching, carrying, lifting, house/yardwork, driving, computer work. NMR and Therapeutic Activities:    [x] (43773 or 29926) Provided verbal/tactile cueing for activities related to improving balance, coordination, kinesthetic sense, posture, motor skill, proprioception and motor activation to allow for proper function of   [x] LE: / Lumbar core, proximal hip and LE with self care and ADLs  [] UE / Cervical: cervical, postural, scapular, scapulothoracic and UE control with self care, carrying, lifting, driving, computer work.   [] (56379) Gait Re-education- Provided training and instruction to the patient for proper LE, core and proximal hip recruitment and positioning and eccentric body weight control with ambulation re-education including up and down stairs     Home Management Training / Self Care:  [] (56150) Provided self-care/home management training related to activities of daily living and compensatory training, and/or use of adaptive equipment for improvement with: ADLs and compensatory training, meal preparation, safety procedures and instruction in use of adaptive equipment, including bathing, grooming, dressing, personal hygiene, basic household cleaning and chores.      Home Exercise Program:    [] (18218) Reviewed/Progressed HEP activities related to strengthening, flexibility, endurance, ROM of   [] LE / Lumbar: core, proximal hip and LE for functional self-care, mobility, lifting and ambulation/stair navigation   [] UE / Cervical: cervical, postural, scapular, scapulothoracic and UE control with self care, reaching, carrying, lifting, house/yardwork, driving, computer work  [] (95719)Reviewed/Progressed HEP activities related to improving balance, coordination, kinesthetic sense, posture, motor skill, proprioception of   [] LE: core, proximal (97916) x 1      [] Vaso  [x] Manual (19869) x  1     [] Ultrasound  [] TA x         [] Mech Traction (61897)  [] Aquatic Therapy x      [x] ES (un) (51448):   [] Home Management Training x  [] ES(attended) (33725)   [] Dry Needling 1-2 muscles (48460):  [] Dry Needling 3+ muscles (275337  [] Group:      [] Other:     GOALS: Short Term Goals: To be achieved in: 2 weeks  1. Independent in HEP and progression per patient tolerance, in order to prevent re-injury. [] Progressing: [] Met: [] Not Met: [] Adjusted  2. Patient will have a decrease in pain to facilitate improvement in movement, function, and ADLs as indicated by Functional Deficits. [] Progressing: [] Met: [] Not Met: [] Adjusted    Long Term Goals: To be achieved in: 5 weeks  2. Patient will demonstrate increased AROM to [employer] of thoracic/lumbar spine, including improving lumbar flexion to 80* and good LS mobility, good hip ROM to allow for proper joint functioning as indicated by patients Functional Deficits. [] Progressing: [] Met: [] Not Met: [] Adjusted  3. Patient will demonstrate an increase in postural awareness and control and activation of deep lumbar stabilizers to allow for proper functional mobility as indicated by patients Functional Deficits. [] Progressing: [] Met: [] Not Met: [] Adjusted  4. Patient will demonstrate an increase in Strength to good proximal hip and core activation to allow for proper functional mobility as indicated by patients Functional Deficits. [] Progressing: [] Met: [] Not Met: [] Adjusted  5. Patient will return to functional activities including standing for > 1 hour  without increased symptoms or restriction. [] Progressing: [] Met: [] Not Met: [] Adjusted  6.  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 [x] Progression is slowed due to complexities/Impairments listed. [] Progression has been slowed due to co-morbidities. [] Plan just implemented, too soon to assess goals progression <30days   [] Goals require adjustment due to lack of progress  [] Patient is not progressing as expected and requires additional follow up with physician  [] Other    Persisting Functional Limitations/Impairments:  [x]Sleeping []Sitting               [x]Standing [x]Transfers        [x]Walking []Kneeling               [x]Stairs [x]Squatting / bending   [x]ADLs [x]Reaching  [x]Lifting  []Housework  [x]Driving [x]Job related tasks  []Sports/Recreation []Other:        ASSESSMENT: Pt presented with some mal-alignments in T/S and L/S regions. Did MET to correct. He still has difficulty flexing spine and reports pain frequently with exercises even when we decrease intensity or reps. He c/o muscle spasms in his back. Unsure what is causing pain to continue at this frequency and intensity. Pt is scheduled for an MRI on Monday. Continue PT. Treatment/Activity Tolerance:  [] Patient able to complete tx  [] Patient limited by fatigue  [x] Patient limited by pain  [] Patient limited by other medical complications  [] Other:       Prognosis: [] Good [x] Fair  [] Poor    Patient Requires Follow-up: [x] Yes  [] No     Plan for next treatment session:  Reassess for progress note-10th visit. Pt not showing improvement at this time. PLAN: See eval. PT 2x / week for 5weeks. [x] Continue per plan of care [] Alter current plan (see comments)  [] Plan of care initiated [] Hold pending MD visit [] Discharge    Electronically signed by: Geeta Velez PT DPT    Note: If patient does not return for scheduled/ recommended follow up visits, his note will serve as a discharge from care along with most recent update on progress.

## 2020-02-25 ENCOUNTER — APPOINTMENT (OUTPATIENT)
Dept: PHYSICAL THERAPY | Age: 45
End: 2020-02-25
Payer: COMMERCIAL

## 2020-02-25 ENCOUNTER — HOSPITAL ENCOUNTER (OUTPATIENT)
Dept: PHYSICAL THERAPY | Age: 45
Setting detail: THERAPIES SERIES
Discharge: HOME | End: 2020-02-25
Payer: COMMERCIAL

## 2020-02-25 NOTE — PROGRESS NOTES
Physical Therapy  Cancellation/No-show Note  Patient Name:  Jerri Burgess  :  1975   Date:  2020  Cancelled visits to date: 2   No-shows to date: 1    Patient status for today's appointment patient:  [x]  Cancelled  (late for appt),    []  Rescheduled appointment  []  No-show      Reason given by patient:  []  Patient ill  []  Conflicting appointment  []  No transportation    []  Conflict with work  []  No reason given  []  Other:     Comments:  Pt R/S as he was stuck in traffic    Phone call information:   []  Phone call made today to patient at _ time at number provided:      []  Patient answered, conversation as follows:    []  Patient did not answer, message left as follows:  [x]  Phone call not made today    Electronically signed by:  Alexi Hoyos, PT DPT

## 2020-02-26 ENCOUNTER — HOSPITAL ENCOUNTER (OUTPATIENT)
Dept: PHYSICAL THERAPY | Age: 45
Setting detail: THERAPIES SERIES
Discharge: HOME | End: 2020-02-26
Payer: COMMERCIAL

## 2020-02-26 ENCOUNTER — OFFICE VISIT (OUTPATIENT)
Dept: INTERNAL MEDICINE CLINIC | Age: 45
End: 2020-02-26
Payer: COMMERCIAL

## 2020-02-26 VITALS
OXYGEN SATURATION: 98 % | DIASTOLIC BLOOD PRESSURE: 74 MMHG | HEART RATE: 69 BPM | SYSTOLIC BLOOD PRESSURE: 118 MMHG | WEIGHT: 249 LBS | BODY MASS INDEX: 28.78 KG/M2

## 2020-02-26 PROCEDURE — G8417 CALC BMI ABV UP PARAM F/U: HCPCS | Performed by: INTERNAL MEDICINE

## 2020-02-26 PROCEDURE — 99213 OFFICE O/P EST LOW 20 MIN: CPT | Performed by: INTERNAL MEDICINE

## 2020-02-26 PROCEDURE — 1036F TOBACCO NON-USER: CPT | Performed by: INTERNAL MEDICINE

## 2020-02-26 PROCEDURE — G8427 DOCREV CUR MEDS BY ELIG CLIN: HCPCS | Performed by: INTERNAL MEDICINE

## 2020-02-26 PROCEDURE — 97110 THERAPEUTIC EXERCISES: CPT

## 2020-02-26 PROCEDURE — G8484 FLU IMMUNIZE NO ADMIN: HCPCS | Performed by: INTERNAL MEDICINE

## 2020-02-26 ASSESSMENT — ENCOUNTER SYMPTOMS
COUGH: 0
BACK PAIN: 1
EYE REDNESS: 0
EYE PAIN: 0
CHOKING: 0

## 2020-02-26 NOTE — PROGRESS NOTES
[id number]  Medical/Treatment Diagnosis Information:  · Diagnosis: Thoracic/Lumbar DDD M51.36, M51.34  · Treatment Diagnosis: Muscle guarding thoracic/lumbar, tight hip flexors/hamstrings, decreased core activation, poor sitting/standing tolerance, inability to work   Insurance/Certification information:  PT Insurance Information: [facility] 40 visits/year  Physician Information:  Referring Practitioner: Mariana Villatoro MD ; Ran Hitchcock MD is PCP  Plan of care signed (Y/N): [x]  Yes []  No     Date of Patient follow up with Physician:      Progress Report: [x]  Yes  []  No     Date Range for reporting period:  Beginning 1/20   Ending    Progress report due (10 Rx/or 30 days whichever is less):     Recertification due (POC duration/ or 90 days whichever is less):    Visit # Insurance Allowable Auth required? Date Range   10/10 40 []  Yes  [x]  No      Latex Allergy:  [x]NO      []YES  Preferred Language for Healthcare:   [x]English       []other:    Functional Scale:      Pain level:  6 /10     SUBJECTIVE:  Pt does feel like therapy has helped. Pt is not back at work yet. Pt reports standing tolerance is only about 15 min. OBJECTIVE:  2/21 pt discussed dry needling with Dr. Ricco Crane.  Dr. Ricco Crane did not approve dry needling    2/26:   ROM   Comments   Cervical Flexion WNL     Cervical Extension WNL             Lumbar Flexion 25 with pain  pain in central llow back   Lumbar Extension 25 with relief        ROM LEFT RIGHT Comments   Lumbar Side Bend LIMITED 25% LIMITED 25%  Tightness on L    Lumbar Rotation WNL WNL               Hamstring Flex Lacking 20* Lacking 30     Piriformis 40 cm from table  40 cm from table Measured from lateral femoral condyle to bottom of plinth      Strength LEFT RIGHT Comments   Hip Flexors 5 5     Hip Abductors 4 4+     Hip Extensors 4+ 5 With pain          RESTRICTIONS/PRECAUTIONS:  Pt on blood thinners (may be contraindication for dry needling)    Exercises/Interventions: Therapeutic Exercises (68900) x 20' Resistance / level Sets/sec Reps Notes   UBE Level 1   4 min on bike      HS Stretch   30\"  X 2 each     Prone Press Up     Prone Hip Extension     If tolerated    TBand mid row blue 2/22   TB LPD blue 2/22   Cat/Camel        Lumbar Ext over SB against wall  Lumbar Sidebend over SB against wall  2/22: decreased hold time d/t muscle spasm   Prayer Stretch       Supine 90/90 HS Stretch with sciatic nerve flossing      Tra hooklying  1/2 foam W's against wall   1/2 foam scap squeezes at wall      Horiz Abd on 1/2 foam   Lat stretch ballet bar       Quadruped Alt Leg Ext       Ext over 1/2 foam roll in supine  2/21: attempted but too painful    Theracane self use       LS stretch attempted but caused muscle spasm in mid back       Quadruped thoracic rotation, full range        Sidelying open book thoracic rotation    3\" x 10 B     Lawnmowers     LEONARD with hip extension     Prone supermans     Prone swimmers                   Therapeutic Activities (18395)              Functional lifting mechanics/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 Education:  -patient educated on diagnosis, prognosis and expectations for rehab  -all patient questions were answered  2/4  Educated pt regarding dry needling, discussed contraindications with [place] dry needling therapist, present.   Talked to pt about discussing potential dry needling with his PCP, Dr Caridad Grossman, who prescribes him the blood providing any significant amount of measurable one-on-one time to either patient, for improvements in  [] LE / lumbar: LE, proximal hip, and core control in self care, mobility, lifting, ambulation and eccentric single leg control. [] UE / cervical: cervical, scapular, scapulothoracic and UE control with self care, reaching, carrying, lifting, house/yardwork, driving, computer work. NMR and Therapeutic Activities:    [x] (62396 or 96377) Provided verbal/tactile cueing for activities related to improving balance, coordination, kinesthetic sense, posture, motor skill, proprioception and motor activation to allow for proper function of   [x] LE: / Lumbar core, proximal hip and LE with self care and ADLs  [] UE / Cervical: cervical, postural, scapular, scapulothoracic and UE control with self care, carrying, lifting, driving, computer work.   [] (09500) Gait Re-education- Provided training and instruction to the patient for proper LE, core and proximal hip recruitment and positioning and eccentric body weight control with ambulation re-education including up and down stairs     Home Management Training / Self Care:  [] (65871) Provided self-care/home management training related to activities of daily living and compensatory training, and/or use of adaptive equipment for improvement with: ADLs and compensatory training, meal preparation, safety procedures and instruction in use of adaptive equipment, including bathing, grooming, dressing, personal hygiene, basic household cleaning and chores.      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 [x] Progression is slowed due to complexities/Impairments listed. [] Progression has been slowed due to co-morbidities. [] Plan just implemented, too soon to assess goals progression <30days   [] Goals require adjustment due to lack of progress  [] Patient is not progressing as expected and requires additional follow up with physician  [] Other    Persisting Functional Limitations/Impairments:  [x]Sleeping []Sitting               [x]Standing [x]Transfers        [x]Walking []Kneeling               [x]Stairs [x]Squatting / bending   [x]ADLs [x]Reaching  [x]Lifting  []Housework  [x]Driving [x]Job related tasks  []Sports/Recreation []Other:        ASSESSMENT: See above     Treatment/Activity Tolerance:  [] Patient able to complete tx  [] Patient limited by fatigue  [x] Patient limited by pain  [] Patient limited by other medical complications  [] Other:       Prognosis: [] Good [x] Fair  [] Poor    Patient Requires Follow-up: [x] Yes  [] No     Plan for next treatment session:      PLAN: See [facility] PT 2x / week for 5weeks. [x] Continue per plan of care [] Alter current plan (see comments)  [] Plan of care initiated [] Hold pending MD visit [] Discharge    Electronically signed by: Cal Walls PT DPT    Note: If patient does not return for scheduled/ recommended follow up visits, his note will serve as a discharge from care along with most recent update on progress.

## 2020-03-04 ENCOUNTER — OFFICE VISIT (OUTPATIENT)
Dept: ORTHOPEDIC SURGERY | Age: 45
End: 2020-03-04
Payer: COMMERCIAL

## 2020-03-04 VITALS
DIASTOLIC BLOOD PRESSURE: 81 MMHG | HEIGHT: 78 IN | HEART RATE: 60 BPM | WEIGHT: 248.9 LBS | BODY MASS INDEX: 28.8 KG/M2 | SYSTOLIC BLOOD PRESSURE: 123 MMHG

## 2020-03-04 PROCEDURE — G8427 DOCREV CUR MEDS BY ELIG CLIN: HCPCS | Performed by: PHYSICAL MEDICINE & REHABILITATION

## 2020-03-04 PROCEDURE — G8417 CALC BMI ABV UP PARAM F/U: HCPCS | Performed by: PHYSICAL MEDICINE & REHABILITATION

## 2020-03-04 PROCEDURE — G8484 FLU IMMUNIZE NO ADMIN: HCPCS | Performed by: PHYSICAL MEDICINE & REHABILITATION

## 2020-03-04 PROCEDURE — 99213 OFFICE O/P EST LOW 20 MIN: CPT | Performed by: PHYSICAL MEDICINE & REHABILITATION

## 2020-03-04 PROCEDURE — 1036F TOBACCO NON-USER: CPT | Performed by: PHYSICAL MEDICINE & REHABILITATION

## 2020-03-05 NOTE — PROGRESS NOTES
Follow up: Latrelle Galeazzi  1975  [id number]         Chief Complaint   Patient presents with    Lower Back Pain     TR MRI LSP         HISTORY OF PRESENT ILLNESS:  Mr. Srinivas Jordan is a 38 y.o. male returns for a follow up visit for multiple medical problems. His current presenting problems are   1. Degenerative disc disease, lumbar    2. Lumbar foraminal stenosis    3. Spondylosis without myelopathy or radiculopathy, lumbar region    . As per information/history obtained from the PADT(patient assessment and documentation tool) - He complains of pain in the lower back with radiation to the upper leg Bilateral He rates the pain 5/10 and describes it as throbbing. Pain is made worse by: walking. He denies side effects from the current pain regimen. Patient reports that since the last follow up visit the physical functioning is unchanged, family/social relationships are unchanged, mood is unchanged and sleep patterns are unchanged, and that the overall functioning is unchanged. Patient denies neurological bowel or bladder.          Associated signs and symptoms:   Neurogenic bowel or bladder symptoms:  no   Perceived weakness:  no   Difficulty walking:  yes              Past Medical History:   Past Medical History:   Diagnosis Date    Collar bone fracture     BIJAL (generalized anxiety disorder)     History of appendectomy     Migraine     Moderate episode of recurrent major depressive disorder ([facility] [address] 5/1/2018    Pulmonary emboli (HCC)       Past Surgical History:     Past Surgical History:   Procedure Laterality Date    APPENDECTOMY  2019   [address] [place]    HERNIA REPAIR  2009     Current Medications:     Current Outpatient Medications:     citalopram (CELEXA) 20 MG tablet, Take 1 tablet by mouth daily, Disp: 90 tablet, Rfl: 3    apixaban (ELIQUIS) 5 MG TABS tablet, TAKE 1 TABLET BY MOUTH TWICE DAILY, Disp: 60 tablet, Rfl: 5    valACYclovir (VALTREX) 1 g tablet, Take 1 tablet by mouth daily,

## 2020-03-10 ENCOUNTER — TELEPHONE (OUTPATIENT)
Dept: INTERNAL MEDICINE CLINIC | Age: 45
End: 2020-03-10

## 2020-09-14 RX ORDER — CITALOPRAM 20 MG/1
20 TABLET ORAL DAILY
Qty: 90 TABLET | Refills: 3 | Status: SHIPPED | OUTPATIENT
Start: 2020-09-14 | End: 2021-02-19 | Stop reason: SDUPTHER

## 2020-10-29 ENCOUNTER — TELEPHONE (OUTPATIENT)
Dept: SLEEP CENTER | Age: 45
End: 2020-10-29

## 2020-10-29 ENCOUNTER — VIRTUAL VISIT (OUTPATIENT)
Dept: PULMONOLOGY | Age: 45
End: 2020-10-29
Payer: COMMERCIAL

## 2020-10-29 PROBLEM — F33.1 MODERATE EPISODE OF RECURRENT MAJOR DEPRESSIVE DISORDER (HCC): Chronic | Status: ACTIVE | Noted: 2018-05-01

## 2020-10-29 PROBLEM — F41.1 GAD (GENERALIZED ANXIETY DISORDER): Chronic | Status: ACTIVE | Noted: 2018-05-01

## 2020-10-29 PROCEDURE — 99214 OFFICE O/P EST MOD 30 MIN: CPT | Performed by: INTERNAL MEDICINE

## 2020-10-29 PROCEDURE — G8427 DOCREV CUR MEDS BY ELIG CLIN: HCPCS | Performed by: INTERNAL MEDICINE

## 2020-10-29 ASSESSMENT — SLEEP AND FATIGUE QUESTIONNAIRES
HOW LIKELY ARE YOU TO NOD OFF OR FALL ASLEEP WHILE SITTING AND READING: 1
HOW LIKELY ARE YOU TO NOD OFF OR FALL ASLEEP IN A CAR, WHILE STOPPED FOR A FEW MINUTES IN TRAFFIC: 0
ESS TOTAL SCORE: 7
HOW LIKELY ARE YOU TO NOD OFF OR FALL ASLEEP WHILE SITTING INACTIVE IN A PUBLIC PLACE: 1
HOW LIKELY ARE YOU TO NOD OFF OR FALL ASLEEP WHILE LYING DOWN TO REST IN THE AFTERNOON WHEN CIRCUMSTANCES PERMIT: 2
HOW LIKELY ARE YOU TO NOD OFF OR FALL ASLEEP WHEN YOU ARE A PASSENGER IN A CAR FOR AN HOUR WITHOUT A BREAK: 1
HOW LIKELY ARE YOU TO NOD OFF OR FALL ASLEEP WHILE SITTING QUIETLY AFTER LUNCH WITHOUT ALCOHOL: 1
HOW LIKELY ARE YOU TO NOD OFF OR FALL ASLEEP WHILE SITTING AND TALKING TO SOMEONE: 0
HOW LIKELY ARE YOU TO NOD OFF OR FALL ASLEEP WHILE WATCHING TV: 1

## 2020-10-29 NOTE — LETTER
[facility] Sleep Medicine  [place] [address] [address] [facility] [address]  Phone: [phone]  Fax: [phone]      October 29, 2020       Patient: Abhishek Narayan   MR Number: [id number]   YOB: 1975   Date of Visit: 10/29/2020     Thank you for allowing me to participate in the care of Tomy Perez. Here is my assessment and plan. Also attached is a copy of his consult note:    ASSESSMENT:  Visit Diagnoses and Associated Orders     Hypersomnia   (New Problem)  -  Primary    needs work-up    Home Sleep Study (HST) [62345 Custom]   - Future Order         Snoring   (New Problem)      needs work-up    Home Sleep Study (HST) [84622 Custom]   - Future Order         BIJAL (generalized anxiety disorder)   (Stable)                 Plan:  Differential diagnosis includes but not limited to: VU, PLMD's, narcolepsy, parasomnias. Reviewed VU (highest likelihood Dx): pathophysiology, diagnosis, complications and treatment. Instructed him not to drive if drowsy. Continue medications per her PCP and other physicians. Limit caffeine use after 3pm. Standard of care is to do in-lab PSG but insurance is mandating an inferior HST. 1 wk follow up after study to review his results. The chronic medical conditions listed are directly related to the primary diagnosis listed above. The management of the primary diagnosis affects the secondary diagnosis and vice versa. Continue meds for: BIJAL. Pt would medically benefit from wt loss for VU (diet, exercise, surgical). Orders Placed This Encounter   Procedures    Home Sleep Study (HST)         If you have questions or concerns, please do not hesitate to call me. I look forward to following Tomy along with you.     Sincerely,      Sally Griffin MD    CC providers:  William Mccarthy, [address] [facility] [address]  VIA In Basket

## 2020-10-29 NOTE — PROGRESS NOTES
Wendy Cuenca MD, [facility], [facility]  [address]iffanie [address] [facility]  [address] [facility] [address]  [address],  [address] [facility], [facility] [address] [phone]   [facility] [address] Ruddy Valdez. [address] [place] [address]. [address] [phone]     Video Visit- Consult    Pursuant to the emergency declaration under the [address] [place] [address], [facility] waiver authority and the [employer] Act, this Virtual  Visit was conducted, with patient's consent, to reduce the patient's risk of exposure to COVID-19. Services were provided through a video synchronous discussion virtually to substitute for in-person clinic visit. Patient was located in their home. Assessment:      Visit Diagnoses and Associated Orders     Hypersomnia   (New Problem)  -  Primary    needs work-up    Home Sleep Study (HST) [83907 Custom]   - Future Order         RLS (restless legs syndrome)   (New Problem)      needs work-up    Home Sleep Study (HST) [57857 Custom]   - Future Order    Iron and TIBC [42789 Custom]   - Future Order    Ferritin [49770 Custom]   - Future Order    TSH with Reflex [04765 Custom]   - Future Order    Renal Panel [[id number] Custom]   - Future Order         BIJAL (generalized anxiety disorder)   (Stable)                  Plan:      Differential diagnosis includes but not limited to: VU, PLMD's, narcolepsy, parasomnias. Reviewed with patient VU: pathophysiology, diagnosis, complications and treatment. Instructed not to drive when drowsy. Continue medications per his PCP and other physicians. Discussed CDL/DOT rules and regulations. He needs to be off driving till work up is done and, if positive for VU, has responded to therapy.  If positive for VU will need to be compliant (at least 4 hrs per night of use and using at least 80% of the days in a 30 day period) on his prescribed treatment, needs repeat test(s) to show treatment is effective, and then may need a negative MWT prior to returning to driving. He also will need 6 month compliance checks if on pressure therapy. This is per federal DOT rules and regulations. Standard of care is to do in-lab PSG but insurance is mandating an inferior HST. 1 wk follow up after study to review his results. The chronic medical conditions listed are directly related to the primary diagnosis listed above. The management of the primary diagnosis affects the secondary diagnosis and vice versa. Will check for secondary causes of RLS (hypothyroid, low Fe, renal dysfunction). Discussed avoiding caffeine and nicotine. Discussed working aerobic exersice and avoiding meds that can exacerbate RLS. Continue meds for: BIJAL. Pt would medically benefit from wt loss for VU (diet, exercise, surgical). Orders Placed This Encounter   Procedures    Iron and TIBC    Ferritin    TSH with Reflex    Renal Panel    Home Sleep Study (HST)          Subjective:     Patient ID: Fransico Tolentino is a 54 y.o. male. Chief Complaint   Patient presents with    Insomnia     taking melatonin    Daytime Sleepiness       HPI:      Tomy Perez is a 54 y.o. male referred by Meme Almanzar MD for a sleep evaluation. He complains of: excessive daytime sleepiness , non-restorative sleep, nocturia, decreased concentration, short term memory issues and tossing and turning at night. He denies: snoring, witnessed apneas, cataplexy and hypnagogic hallucinations. Trouble initiating and maintaining sleep. Has tried melatonin 10 mg but wakes groggy with it. Legs bother him at night at times. Not painful but feels his legs will keep moving when laying down or sitting for long periods of time but worse at night. Attempts bed at 9P, but not sleepy at that time, mind will race. Has to wake at 5 AM for work. On weekends will sleep in latter to 8-9A and feels better. Tried belsomra but not sure if it helped much. Stress has been worse over the last 5 years.     No results found for: FERRITIN  No results found for: IRON, TIBC, FERRITIN  Lab Results   Component Value Date    TSH 1.08 2015     Lab Results   Component Value Date    CREATININE 1.2 2019     Lab Results   Component Value Date    BUN 14 2019       BIJAL: stable on meds and followed by pt's PCP and other physicians. Previous evaluation and treatment has included- none    DOT/CDL - No  FAA/'s license -No    Previous Report(s) Reviewed: historical medical records, office notes, andreferral letter(s). Pertinent data has been documented. [place] - Total score: 7    Caffeine Intake - None. Social History     Socioeconomic History    Marital status: Single     Spouse name: Not on file    Number of children: Not on file    Years of education: Not on file    Highest education level: Not on file   Occupational History    Not on file   Social Needs    Financial resource strain: Not on file    Food insecurity     Worry: Not on file     Inability: Not on file    Transportation needs     Medical: Not on file     Non-medical: Not on file   Tobacco Use    Smoking status: Former Smoker     Packs/day: 0.50     Years: 25.00     Pack years: 12.50     Types: Cigarettes     Last attempt to quit: 2015     Years since quittin.1    Smokeless tobacco: Never Used    Tobacco comment: started to smoke at 12 / smoked up to 1 to 1.5 p.p.d / quit smoking 2015   Substance and Sexual Activity    Alcohol use:  Yes     Alcohol/week: 0.0 standard drinks     Comment: socially    Drug use: No    Sexual activity: Yes     Partners: Female     Comment: 1 child   Lifestyle    Physical activity     Days per week: Not on file     Minutes per session: Not on file    Stress: Not on file   Relationships    Social connections     Talks on phone: Not on file     Gets together: Not on file     Attends Faith service: Not on file     Active member of club or organization: Not on file     Attends meetings of clubs or organizations: Not on file     Relationship status: Not on file    Intimate partner violence     Fear of current or ex partner: Not on file     Emotionally abused: Not on file     Physically abused: Not on file     Forced sexual activity: Not on file   Other Topics Concern    Not on file   Social History Narrative    Not on file        Current Outpatient Medications   Medication Sig Dispense Refill    apixaban (ELIQUIS) 5 MG TABS tablet TAKE 1 TABLET BY MOUTH TWICE DAILY 60 tablet 11    citalopram (CELEXA) 20 MG tablet TAKE 1 TABLET BY MOUTH DAILY 90 tablet 3     No current facility-administered medications for this visit. Allergies as of 10/29/2020    (No Known Allergies)       Patient Active Problem List   Diagnosis    Chronic tension headaches    Gastritis    Insomnia    Bilateral pulmonary embolism (HCC)    Chest pain varying with breathing    Pulmonary infarct (HCC)    Moderate episode of recurrent major depressive disorder ([address]    BIJAL (generalized anxiety disorder)       Past Medical History:   Diagnosis Date    Collar bone fracture     BIJAL (generalized anxiety disorder)     History of appendectomy     Migraine     Moderate episode of recurrent major depressive disorder ([address] 5/1/2018    Pulmonary emboli ([address]        Past Surgical History:   Procedure Laterality Date    APPENDECTOMY  2019    FOREARM SURGERY  2000    HERNIA REPAIR  2009       Family History   Problem Relation Age of Onset    Diabetes Mother     Clotting Disorder Maternal Grandfather     Diabetes Sister        Objective:     Vitals:  Patient reported Height and Weight Calculated BMI   Patient-Reported Vitals 10/29/2020   Patient-Reported Weight 250#   Patient-Reported Height 6'6\"       28.9     Due to COVID-19 this was a virtual visit and physical exam was deferred.     Electronically signed by Jose Sheffield MD on10/29/2020 at 9:46 AM

## 2020-10-29 NOTE — LETTER
[facility] Sleep Medicine  [place] [address] [facility] [address]  Phone: [phone]  Fax: [phone]      October 29, 2020       Patient: Lani Joshua   MR Number: [id number]   YOB: 1975   Date of Visit: 10/29/2020     Thank you for allowing me to participate in the care of Tomy Perez. Here is my assessment and plan. Also attached is a copy of his consult note:    ASSESSMENT:  Visit Diagnoses and Associated Orders     Hypersomnia   (New Problem)  -  Primary    needs work-up    Home Sleep Study (HST) [24584 Custom]   - Future Order         RLS (restless legs syndrome)   (New Problem)      needs work-up    Home Sleep Study (HST) [[id number] Custom]   - Future Order    Iron and TIBC [82090 Custom]   - Future Order    Ferritin [[id number] Custom]   - Future Order    TSH with Reflex [[id number] Custom]   - Future Order    Renal Panel [[id number] Custom]   - Future Order         BIJAL (generalized anxiety disorder)   (Stable)                 Plan:  Differential diagnosis includes but not limited to: VU, PLMD's, narcolepsy, parasomnias. Reviewed with patient VU: pathophysiology, diagnosis, complications and treatment. Instructed not to drive when drowsy. Continue medications per his PCP and other physicians. Discussed CDL/DOT rules and regulations. He needs to be off driving till work up is done and, if positive for VU, has responded to therapy. If positive for VU will need to be compliant (at least 4 hrs per night of use and using at least 80% of the days in a 30 day period) on his prescribed treatment, needs repeat test(s) to show treatment is effective, and then may need a negative MWT prior to returning to driving. He also will need 6 month compliance checks if on pressure therapy. This is per federal DOT rules and regulations. Standard of care is to do in-lab PSG but insurance is mandating an inferior HST.   1 wk follow up after study to review his

## 2020-11-11 ENCOUNTER — VIRTUAL VISIT (OUTPATIENT)
Dept: INTERNAL MEDICINE CLINIC | Age: 45
End: 2020-11-11
Payer: COMMERCIAL

## 2020-11-11 PROCEDURE — G8427 DOCREV CUR MEDS BY ELIG CLIN: HCPCS | Performed by: NURSE PRACTITIONER

## 2020-11-11 PROCEDURE — 99214 OFFICE O/P EST MOD 30 MIN: CPT | Performed by: NURSE PRACTITIONER

## 2020-11-11 PROCEDURE — G8431 POS CLIN DEPRES SCRN F/U DOC: HCPCS | Performed by: NURSE PRACTITIONER

## 2020-11-11 RX ORDER — BUSPIRONE HYDROCHLORIDE 7.5 MG/1
7.5 TABLET ORAL 3 TIMES DAILY
Qty: 90 TABLET | Refills: 0 | Status: SHIPPED | OUTPATIENT
Start: 2020-11-11 | End: 2020-12-11

## 2020-11-11 ASSESSMENT — COLUMBIA-SUICIDE SEVERITY RATING SCALE - C-SSRS
6. HAVE YOU EVER DONE ANYTHING, STARTED TO DO ANYTHING, OR PREPARED TO DO ANYTHING TO END YOUR LIFE?: NO
1. WITHIN THE PAST MONTH, HAVE YOU WISHED YOU WERE DEAD OR WISHED YOU COULD GO TO SLEEP AND NOT WAKE UP?: NO
2. HAVE YOU ACTUALLY HAD ANY THOUGHTS OF KILLING YOURSELF?: NO

## 2020-11-11 ASSESSMENT — PATIENT HEALTH QUESTIONNAIRE - PHQ9
4. FEELING TIRED OR HAVING LITTLE ENERGY: 3
8. MOVING OR SPEAKING SO SLOWLY THAT OTHER PEOPLE COULD HAVE NOTICED. OR THE OPPOSITE, BEING SO FIGETY OR RESTLESS THAT YOU HAVE BEEN MOVING AROUND A LOT MORE THAN USUAL: 0
5. POOR APPETITE OR OVEREATING: 0
3. TROUBLE FALLING OR STAYING ASLEEP: 3
6. FEELING BAD ABOUT YOURSELF - OR THAT YOU ARE A FAILURE OR HAVE LET YOURSELF OR YOUR FAMILY DOWN: 1
10. IF YOU CHECKED OFF ANY PROBLEMS, HOW DIFFICULT HAVE THESE PROBLEMS MADE IT FOR YOU TO DO YOUR WORK, TAKE CARE OF THINGS AT HOME, OR GET ALONG WITH OTHER PEOPLE: 3
7. TROUBLE CONCENTRATING ON THINGS, SUCH AS READING THE NEWSPAPER OR WATCHING TELEVISION: 3
9. THOUGHTS THAT YOU WOULD BE BETTER OFF DEAD, OR OF HURTING YOURSELF: 0
SUM OF ALL RESPONSES TO PHQ QUESTIONS 1-9: 14
SUM OF ALL RESPONSES TO PHQ QUESTIONS 1-9: 14
SUM OF ALL RESPONSES TO PHQ9 QUESTIONS 1 & 2: 4
1. LITTLE INTEREST OR PLEASURE IN DOING THINGS: 2
2. FEELING DOWN, DEPRESSED OR HOPELESS: 2
SUM OF ALL RESPONSES TO PHQ QUESTIONS 1-9: 14

## 2020-11-11 ASSESSMENT — ENCOUNTER SYMPTOMS
EYES NEGATIVE: 1
GASTROINTESTINAL NEGATIVE: 1
BACK PAIN: 1
SHORTNESS OF BREATH: 1
ALLERGIC/IMMUNOLOGIC NEGATIVE: 1

## 2020-11-11 NOTE — PROGRESS NOTES
2020    TELEHEALTH EVALUATION -- Audio/Visual (During XFLQS-26 public health emergency)    HPI:Presents with follow up for recent ED visit for pleurisy and atypical chest pain. COVID-19 test negative    Tomy Perez (:  1975) has requested an audio/video evaluation for the following concern(s):    SOB    Review of Systems   Constitutional: Positive for fatigue. HENT: Positive for congestion. Eyes: Negative. Respiratory: Positive for shortness of breath. Cardiovascular: Positive for chest pain. Gastrointestinal: Negative. Endocrine: Negative. Genitourinary: Negative. Musculoskeletal: Positive for back pain. Allergic/Immunologic: Negative. Neurological: Negative. Hematological: Negative. Psychiatric/Behavioral: The patient is nervous/anxious. Prior to Visit Medications    Medication Sig Taking? Authorizing Provider   apixaban (ELIQUIS) 5 MG TABS tablet TAKE 1 TABLET BY MOUTH TWICE DAILY Yes Ruthy Devlin MD   citalopram (CELEXA) 20 MG tablet TAKE 1 TABLET BY MOUTH DAILY Yes Ruthy Devlin MD       Social History     Tobacco Use    Smoking status: Former Smoker     Packs/day: 0.50     Years: 25.00     Pack years: 12.50     Types: Cigarettes     Last attempt to quit: 2015     Years since quittin.2    Smokeless tobacco: Never Used    Tobacco comment: started to smoke at 12 / smoked up to 1 to 1.5 p.p.d / quit smoking 2015   Substance Use Topics    Alcohol use:  Yes     Alcohol/week: 0.0 standard drinks     Comment: socially    Drug use: No        Past Medical History:   Diagnosis Date    Collar bone fracture     BIJAL (generalized anxiety disorder)     History of appendectomy     Migraine     Moderate episode of recurrent major depressive disorder ([address] 2018    Pulmonary [address]oli ([facility] [address]    ,   Past Surgical History:   Procedure Laterality Date    APPENDECTOMY  2019    FOREARM SURGERY     [facility] HERNIA REPAIR  2009   ,   Family History   Problem Relation Age of Onset    Diabetes Mother     Clotting Disorder Maternal Grandfather     Diabetes Sister        PHYSICAL EXAMINATION:  [ INSTRUCTIONS:  \"[x]\" Indicates a positive item  \"[]\" Indicates a negative item  -- DELETE ALL ITEMS NOT EXAMINED]  Vital Signs: (As obtained by patient/caregiver or practitioner observation)    Blood pressure-  Heart rate-    Respiratory rate-    Temperature-  Pulse oximetry-     Constitutional: [x] Appears well-developed and well-nourished [x] No apparent distress      [] Abnormal-   Mental status  [x] Alert and awake  [x] Oriented to person/place/time []Able to follow commands      Eyes:  EOM    []  Normal  [] Abnormal-  Sclera  []  Normal  [] Abnormal -         Discharge []  None visible  [] Abnormal -    HENT:   [] Normocephalic, atraumatic.   [] Abnormal   [] Mouth/Throat: Mucous membranes are moist.     External Ears [] Normal  [] Abnormal-     Neck: [] No visualized mass     Pulmonary/Chest: [x] Respiratory effort normal.  [x] No visualized signs of difficulty breathing or respiratory distress        [] Abnormal-      Musculoskeletal:   [] Normal gait with no signs of ataxia         [] Normal range of motion of neck        [] Abnormal-       Neurological:        [] No Facial Asymmetry (Cranial nerve 7 motor function) (limited exam to video visit)          [] No gaze palsy        [] Abnormal-         Skin:        [x] No significant exanthematous lesions or discoloration noted on facial skin         [] Abnormal-            Psychiatric:       [] Normal Affect [] No Hallucinations        [x] Abnormal- flat affect, tearful intervals, voiced fear of being in a ditch with dirt piling on top of him    Other pertinent observable physical exam findings-     ASSESSMENT/PLAN:  Anxiety    -Walk short distances each day  -Take 10 slow deep breathes when SOB occurs  -Take Buspar every 8 hours for next 7 days, then decrease to every 12 hours if symptoms improve  -Referral to psychology    No follow-ups on file. Tony Briggs is a 54 y.o. male being evaluated by a Virtual Visit (video visit) encounter to address concerns as mentioned above. A caregiver was present when appropriate. Due to this being a TeleHealth encounter (During BCPVD-94 public health emergency), evaluation of the following organ systems was limited: Vitals/Constitutional/EENT/Resp/CV/GI//MS/Neuro/Skin/Heme-Lymph-Imm. Pursuant to the emergency declaration under the [address] [place] [address], [address] [place] [address] authority and the [employer] Act, this Virtual Visit was conducted with patient's (and/or legal guardian's) consent, to reduce the patient's risk of exposure to COVID-19 and provide necessary medical care. The patient (and/or legal guardian) has also been advised to contact this office for worsening conditions or problems, and seek emergency medical treatment and/or call 911 if deemed necessary. Patient identification was verified at the start of the visit: Yes    Total time spent on this encounter: Not billed by time    Services were provided through a video synchronous discussion virtually to substitute for in-person clinic visit. Patient and provider were located at their individual homes. --RUPERT Madrid CNP on 11/11/2020 at 4:08 PM    An electronic signature was used to authenticate this note. On the basis of positive PHQ-9 screening (PHQ-9 Total Score: 14), the following plan was implemented: referral to Behavioral health provided and anxiety medication prescribed.   Patient will follow-up in 2 week(s) with psychologist.

## 2020-11-16 ENCOUNTER — TELEPHONE (OUTPATIENT)
Dept: INTERNAL MEDICINE CLINIC | Age: 45
End: 2020-11-16

## 2020-11-16 NOTE — TELEPHONE ENCOUNTER
----- Message from Shania Park sent at 11/16/2020  2:35 PM EST -----  Subject: Message to Provider    QUESTIONS  Information for Provider? Pt saw 11/11/2020 Beatriz mckeon CNP and was   expecting to get a call from her just following up and he want to talk   about other issue as well  ---------------------------------------------------------------------------  --------------  CALL BACK INFO  What is the best way for the office to contact you? OK to leave message on   voicemail   OK to respond with secure message via [employer] portal (only for patients   who have registered [employer] account)  Preferred Call Back Phone Number? [phone]  ---------------------------------------------------------------------------  --------------  SCRIPT ANSWERS  Relationship to Patient?  Self

## 2020-11-17 ENCOUNTER — PATIENT MESSAGE (OUTPATIENT)
Dept: INTERNAL MEDICINE CLINIC | Age: 45
End: 2020-11-17

## 2020-11-24 ENCOUNTER — OFFICE VISIT (OUTPATIENT)
Dept: PSYCHOLOGY | Age: 45
End: 2020-11-24
Payer: COMMERCIAL

## 2020-11-24 PROCEDURE — 1036F TOBACCO NON-USER: CPT | Performed by: PSYCHOLOGIST

## 2020-11-24 PROCEDURE — 90791 PSYCH DIAGNOSTIC EVALUATION: CPT | Performed by: PSYCHOLOGIST

## 2020-11-24 NOTE — PATIENT INSTRUCTIONS
1) Put an alarm on your phone at 9p and label it draw and meditate. Draw for 5 minutes and then do a mindfulness meditation for anxiety on Youtube. 2) In the morning, start getting up 10 minutes earlier for work and do a progressive muscle relaxation on Youtube. 3) Look over a list of values. Choose 5 that are most important to you. Bring it in next time. A Quick Look at Your Values   Values are your hearts deepest desires for how you want to behave as a human being. Values are not about what you want to get or achieve; they are about how you want to behave or act on an ongoing basis. There are literally hundreds of different values, but below youll find a list of the most common ones. Probably, not all of them will be relevant to you. Keep in mind there are no such things as right values or wrong values. Its a bit like our taste in pizzas. If you prefer ham and pineapple but I prefer salami and olives, that doesnt mean that my taste in pizzas is right and yours is wrong. It just means we have different tastes. And similarly, we may have different values. So read through the list below and write a letter next to each value: V = Very  important, Q = Quite important, and N = Not so important; and make sure to score at least ten of them as Very important. 1. Acceptance: to be open to and accepting of myself, others, life etc  2. Adventure: to be adventurous; to actively seek, create, or explore novel or stimulating  experiences  3. Assertiveness: to respectfully stand up for my rights and request what I want  4. Authenticity: to be authentic, genuine, real; to be true to myself  5. Beauty: to appreciate, create, nurture or cultivate beauty in myself, others, the  environment etc  6. Caring: to be caring towards myself, others, the environment etc  7. Challenge: to keep challenging myself to grow, learn, improve  8. Compassion: to act with kindness towards those who are suffering  9.  Connection: to engage fully in whatever I am doing, and be fully present with others  10. Contribution: to contribute, help, assist, or make a positive difference to myself or  others  11. Conformity: to be respectful and obedient of rules and obligations  12. Cooperation: to be cooperative and collaborative with others  15. Courage: to be courageous or brave; to persist in the face of fear, threat, or difficulty  14. Creativity: to be creative or innovative  15. Curiosity: to be curious, open-minded and interested; to explore and discover  16. Encouragement: to encourage and reward behaviour that I value in myself or others  17. [place]: to treat others as equal to myself, and vice-versa  18. Excitement: to seek, create and engage in activities that are exciting, stimulating or  thrilling  19. Fairness: to be fair to myself or others  20. Fitness: to maintain or improve my fitness; to look after my physical and mental  health and wellbeing  21. Flexibility: to adjust and adapt readily to changing circumstances  22. Freedom: to live freely; to choose how I live and behave, or help others do likewise  23. Friendliness: to be friendly, companionable, or agreeable towards others  24. Forgiveness: to be forgiving towards myself or others  25. Fun: to be fun-loving; to seek, create, and engage in fun-filled activities  26. Generosity: to be generous, sharing and giving, to myself or others  27. Gratitude: to be grateful for and appreciative of the positive aspects of myself, others  and life  28. Honesty: to be honest, truthful, and sincere with myself and others  29. Humour: to see and appreciate the humorous side of life  30. Humility: to be humble or modest; to let my achievements speak for themselves  31. Industry: to be industrious, hard-working, dedicated  28. [place]: to be self-supportive, and choose my own way of doing things  33.  Intimacy: to open up, reveal, and share myself -- emotionally or physically - in my  close personal relationships  29. Justice: to uphold justice and fairness  35. Kindness: to be kind, compassionate, considerate, nurturing or caring towards myself  or others  39. Love: to act lovingly or affectionately towards myself or others  40. Mindfulness: to be conscious of, open to, and curious about my here-and-now  experience  45. Order: to be orderly and organized  44. Open-mindedness: to think things through, see things from others points of view, and  weigh evidence fairly. 36. Patience: to wait calmly for what I want  41. Persistence: to continue resolutely, despite problems or difficulties. 42. Pleasure: to create and give pleasure to myself or others  43. Power: to strongly influence or wield authority over others, e.g. taking charge,  leading, organizing  44. Reciprocity: to build relationships in which there is a fair balance of giving and taking  45. Respect: to be respectful towards myself or others; to be polite, considerate and show  positive regard  46. Responsibility: to be responsible and accountable for my actions  47. Romance: to be romantic; to display and express love or strong affection  50. Safety: to secure, protect, or ensure safety of myself or others  49. Self-awareness: to be aware of my own thoughts, feelings and actions  50. Self-care: to look after my health and wellbeing, and get my needs met  51. Self-development: to keep growing, advancing or improving in knowledge, skills,  character, or life experience. 52. Self-control: to act in accordance with my own ideals  48. Sensuality: to create, explore and enjoy experiences that stimulate the five senses  54. Sexuality: to explore or express my sexuality  54. Spirituality: to connect with things bigger than myself  56. Skilfulness: to continually practice and improve my skills, and apply myself fully  when using them  57. Supportiveness: to be supportive, helpful, encouraging, and available to myself or  others  62. Trust: to be trustworthy; to be loyal, faithful, sincere, and reliable  59. Insert your own unlisted value here:  60. Insert your own unlisted value here:    Once youve marked each value as V, Q, N (Very, Quite, or Not so important), go through all the Vs, and select out the top six that are most important to you. [place] each one with a 6, to show its in your top six. Finally, write those six values out below, to remind yourself this is what you want to stand for as a human being. From: [email or web address]. pdf

## 2020-11-24 NOTE — PROGRESS NOTES
Behavioral Health Consultation  Deb Srivastava, Ph.D.  Psychologist  11/24/2020   4:26 PM EST       Time spent with Patient: 30 minutes  This is patient's first [facility] appointment. Reason for Consult:    Chief Complaint   Patient presents with    Anxiety       Pt provided informed consent for the behavioral health program. Discussed with patient model of service to include the limits of confidentiality (i.e. abuse reporting, suicide  intervention, etc.) and short-term intervention focused approach. Pt indicated understanding. Feedback given to PCP. S:  Pt seen per PCP re: anxiety    Pt seen for intake and described sxs consistent w/ an Anxiety Disorder, unspecified. Pt specifically endorsed  Anxious thoughts, muscle tension, restlessness, insomnia, fatigue, irritability. Pt noted that their sxs began several months ago and attributed onset to a variety of environmental factors. Exacerbating factors include limited social support, challenging living environment, limited engagement in self care. Mitigating factors are working out, journaling, and drawing though Pt has not engaged in these behaviors in quite some time.  Focused intervention on psychoed re: anxiety, mindfulness, and values        O:  MSE:    Appearance: good hygiene   Attitude: cooperative and friendly  Consciousness: alert  Orientation: oriented to person, place, time, general circumstance  Memory: recent and remote memory intact  Attention/Concentration: intact during session  Psychomotor Activity:normal  Eye Contact: normal  Speech: normal rate and volume, well-articulated  Mood: anxious  Affect: congruent  Perception: within normal limits  Thought Content: within normal limits  Thought Process: logical, coherent  Insight: good  Judgment: intact  Ability to understand instructions: Yes  Morbid Ideation: no   Suicide Assessment: no suicidal ideation, plan, or intent  Homicidal Ideation: no     History:    Social History:   Social History Socioeconomic History    Marital status: Single     Spouse name: Not on file    Number of children: Not on file    Years of education: Not on file    Highest education level: Not on file   Occupational History    Not on file   Social Needs    Financial resource strain: Not on file    Food insecurity     Worry: Not on file     Inability: Not on file    Transportation needs     Medical: Not on file     Non-medical: Not on file   Tobacco Use    Smoking status: Former Smoker     Packs/day: 0.50     Years: 25.00     Pack years: 12.50     Types: Cigarettes     Last attempt to quit: 2015     Years since quittin.2    Smokeless tobacco: Never Used    Tobacco comment: started to smoke at 12 / smoked up to 1 to 1.5 p.p.d / quit smoking 2015   Substance and Sexual Activity    Alcohol use: Yes     Alcohol/week: 0.0 standard drinks     Comment: socially    Drug use: No    Sexual activity: Yes     Partners: Female     Comment: 1 child   Lifestyle    Physical activity     Days per week: Not on file     Minutes per session: Not on file    Stress: Not on file   Relationships    Social connections     Talks on phone: Not on file     Gets together: Not on file     Attends Islam service: Not on file     Active member of club or organization: Not on file     Attends meetings of clubs or organizations: Not on file     Relationship status: Not on file    Intimate partner violence     Fear of current or ex partner: Not on file     Emotionally abused: Not on file     Physically abused: Not on file     Forced sexual activity: Not on file   Other Topics Concern    Not on file   Social History Narrative    Not on file     TOBACCO:   reports that he quit smoking about 5 years ago. His smoking use included cigarettes. He has a 12.50 pack-year smoking history. He has never used smokeless tobacco.  ETOH:   reports current alcohol use. A:  Patient engaged and cooperative. denies SI.  Insight and motivation are good. Diagnosis:    1. Anxiety disorder, unspecified type          Plan:    Patient Instructions   1) Put an alarm on your phone at 9p and label it draw and meditate. Draw for 5 minutes and then do a mindfulness meditation for anxiety on Youtube. 2) In the morning, start getting up 10 minutes earlier for work and do a progressive muscle relaxation on Youtube. 3) Look over a list of values. Choose 5 that are most important to you. Bring it in next time. A Quick Look at Your Values   Values are your hearts deepest desires for how you want to behave as a human being. Values are not about what you want to get or achieve; they are about how you want to behave or act on an ongoing basis. There are literally hundreds of different values, but below youll find a list of the most common ones. Probably, not all of them will be relevant to you. Keep in mind there are no such things as right values or wrong values. Its a bit like our taste in pizzas. If you prefer ham and pineapple but I prefer salami and olives, that doesnt mean that my taste in pizzas is right and yours is wrong. It just means we have different tastes. And similarly, we may have different values. So read through the list below and write a letter next to each value: V = Very  important, Q = Quite important, and N = Not so important; and make sure to score at least ten of them as Very important. 1. Acceptance: to be open to and accepting of myself, others, life etc  2. Adventure: to be adventurous; to actively seek, create, or explore novel or stimulating  experiences  3. Assertiveness: to respectfully stand up for my rights and request what I want  4. Authenticity: to be authentic, genuine, real; to be true to myself  5. Beauty: to appreciate, create, nurture or cultivate beauty in myself, others, the  environment etc  6. Caring: to be caring towards myself, others, the environment etc  7.  Challenge: to keep challenging myself to grow, learn, improve  8. Compassion: to act with kindness towards those who are suffering  9. Connection: to engage fully in whatever I am doing, and be fully present with others  10. Contribution: to contribute, help, assist, or make a positive difference to myself or  others  11. Conformity: to be respectful and obedient of rules and obligations  12. Cooperation: to be cooperative and collaborative with others  15. Courage: to be courageous or brave; to persist in the face of fear, threat, or difficulty  14. Creativity: to be creative or innovative  15. Curiosity: to be curious, open-minded and interested; to explore and discover  16. Encouragement: to encourage and reward behaviour that I value in myself or others  17. [place]: to treat others as equal to myself, and vice-versa  18. Excitement: to seek, create and engage in activities that are exciting, stimulating or  thrilling  19. Fairness: to be fair to myself or others  20. Fitness: to maintain or improve my fitness; to look after my physical and mental  health and wellbeing  21. Flexibility: to adjust and adapt readily to changing circumstances  22. Freedom: to live freely; to choose how I live and behave, or help others do likewise  23. Friendliness: to be friendly, companionable, or agreeable towards others  24. Forgiveness: to be forgiving towards myself or others  25. Fun: to be fun-loving; to seek, create, and engage in fun-filled activities  26. Generosity: to be generous, sharing and giving, to myself or others  27. Gratitude: to be grateful for and appreciative of the positive aspects of myself, others  and life  28. Honesty: to be honest, truthful, and sincere with myself and others  29. Humour: to see and appreciate the humorous side of life  30. Humility: to be humble or modest; to let my achievements speak for themselves  31. Industry: to be industrious, hard-working, dedicated  28.  [place]: to be self-supportive, and choose my own way of doing things  33. Intimacy: to open up, reveal, and share myself -- emotionally or physically - in my  close personal relationships  29. Justice: to uphold justice and fairness  35. Kindness: to be kind, compassionate, considerate, nurturing or caring towards myself  or others  39. Love: to act lovingly or affectionately towards myself or others  40. Mindfulness: to be conscious of, open to, and curious about my here-and-now  experience  45. Order: to be orderly and organized  44. Open-mindedness: to think things through, see things from others points of view, and  weigh evidence fairly. 36. Patience: to wait calmly for what I want  41. Persistence: to continue resolutely, despite problems or difficulties. 42. Pleasure: to create and give pleasure to myself or others  43. Power: to strongly influence or wield authority over others, e.g. taking charge,  leading, organizing  44. Reciprocity: to build relationships in which there is a fair balance of giving and taking  45. Respect: to be respectful towards myself or others; to be polite, considerate and show  positive regard  46. Responsibility: to be responsible and accountable for my actions  47. Romance: to be romantic; to display and express love or strong affection  50. Safety: to secure, protect, or ensure safety of myself or others  49. Self-awareness: to be aware of my own thoughts, feelings and actions  50. Self-care: to look after my health and wellbeing, and get my needs met  51. Self-development: to keep growing, advancing or improving in knowledge, skills,  character, or life experience. 52. Self-control: to act in accordance with my own ideals  48. Sensuality: to create, explore and enjoy experiences that stimulate the five senses  54. Sexuality: to explore or express my sexuality  54. Spirituality: to connect with things bigger than myself  56. Skilfulness: to continually practice and improve my skills, and apply myself fully  when using them  57.

## 2020-12-04 ENCOUNTER — TELEPHONE (OUTPATIENT)
Dept: INTERNAL MEDICINE CLINIC | Age: 45
End: 2020-12-04

## 2020-12-10 ENCOUNTER — OFFICE VISIT (OUTPATIENT)
Dept: INTERNAL MEDICINE CLINIC | Age: 45
End: 2020-12-10
Payer: COMMERCIAL

## 2020-12-10 ENCOUNTER — OFFICE VISIT (OUTPATIENT)
Dept: PSYCHOLOGY | Age: 45
End: 2020-12-10
Payer: COMMERCIAL

## 2020-12-10 VITALS
SYSTOLIC BLOOD PRESSURE: 118 MMHG | HEART RATE: 62 BPM | BODY MASS INDEX: 29.05 KG/M2 | OXYGEN SATURATION: 98 % | HEIGHT: 77 IN | WEIGHT: 246 LBS | DIASTOLIC BLOOD PRESSURE: 82 MMHG

## 2020-12-10 DIAGNOSIS — F90.0 ATTENTION DEFICIT HYPERACTIVITY DISORDER (ADHD), PREDOMINANTLY INATTENTIVE TYPE: Primary | ICD-10-CM

## 2020-12-10 PROCEDURE — 90832 PSYTX W PT 30 MINUTES: CPT | Performed by: PSYCHOLOGIST

## 2020-12-10 PROCEDURE — G8484 FLU IMMUNIZE NO ADMIN: HCPCS | Performed by: INTERNAL MEDICINE

## 2020-12-10 PROCEDURE — 90715 TDAP VACCINE 7 YRS/> IM: CPT | Performed by: INTERNAL MEDICINE

## 2020-12-10 PROCEDURE — 90471 IMMUNIZATION ADMIN: CPT | Performed by: INTERNAL MEDICINE

## 2020-12-10 PROCEDURE — 99396 PREV VISIT EST AGE 40-64: CPT | Performed by: INTERNAL MEDICINE

## 2020-12-10 PROCEDURE — 1036F TOBACCO NON-USER: CPT | Performed by: PSYCHOLOGIST

## 2020-12-10 RX ORDER — LISDEXAMFETAMINE DIMESYLATE 40 MG/1
1 CAPSULE ORAL DAILY
Qty: 30 CAPSULE | Refills: 0 | Status: SHIPPED | OUTPATIENT
Start: 2020-12-10 | End: 2021-01-07 | Stop reason: DRUGHIGH

## 2020-12-10 RX ORDER — VALACYCLOVIR HYDROCHLORIDE 500 MG/1
500 TABLET, FILM COATED ORAL DAILY
Qty: 30 TABLET | Refills: 5 | Status: SHIPPED | OUTPATIENT
Start: 2020-12-10 | End: 2021-11-04 | Stop reason: SDUPTHER

## 2020-12-10 NOTE — PROGRESS NOTES
General Questions  Why are you here?: \"My school forced me to come here\".    Precipitating Events: Pt reports that a safety check was called for pt today and police came to assess for safety due to threats of shooting herself - states that she completed t History and Physical      Tomy Perez  YOB: 1975    Date of Service:  12/10/2020    Chief Complaint:   Zenon Michelle is a 54 y.o. male who presents for complete physical examination.     HPI: No complaints today    Wt Readings from Last 3 Encounters:   12/10/20 246 lb (111.6 kg)   03/04/20 248 lb 14.4 oz (112.9 kg)   02/26/20 249 lb (112.9 kg)     BP Readings from Last 3 Encounters:   12/10/20 118/82   03/04/20 123/81   02/26/20 118/74       Patient Active Problem List   Diagnosis    Chronic tension headaches    Gastritis    Insomnia    Bilateral pulmonary embolism (HCC)    Chest pain varying with breathing    Pulmonary infarct (HCC)    Moderate episode of recurrent major depressive disorder ([facility] [address]    BIJAL (generalized anxiety disorder)       Preventive Care:  Health Maintenance   Topic Date Due    Hepatitis C screen  1975    Pneumococcal 0-64 years Vaccine (1 of 1 - PPSV23) 06/25/1980    Diabetes screen  06/25/2014    Lipid screen  10/29/2015    Flu vaccine (1) 12/10/2021 (Originally 9/1/2020)    DTaP/Tdap/Td vaccine (3 - Td) 12/10/2030    HIV screen  Completed    Hepatitis A vaccine  Aged Out    Hepatitis B vaccine  Aged Out    Hib vaccine  Aged Out    Meningococcal (ACWY) vaccine  Aged Out        Last eye exam: overdue , abnormal - wears reader   Last PSA: overdue   Last dental exam: over a year   Exercise: joined a boxing gym  Servings of veggies per day: daily, would like to eat more   Servings of fruit per day: not daily   Sugary beverages: avoids   Red Meat intake: not often, mainly chicken  Seatbelt use: 75%  Lipid panel:    Lab Results   Component Value Date    CHOL 148 10/29/2010    TRIG 101 10/29/2010    HDL 42 10/29/2010    LDLCALC 86 10/29/2010         Immunization History   Administered Date(s) Administered    Influenza 10/29/2010    Tdap (Boostrix, Adacel) 10/29/2010, 12/10/2020       No Known Allergies  Outpatient Medications Marked as Taking for the 12/10/20 they went to the house for a safety check, learned that there is a gun in the home but locked and with no ammo. She was denying thoughts of suicide when they came over.  She then came to school, school social work did a screening for suicide and denied any father reports no real concerns at this time     Referral Source  Referral Source: School  Organization Name: [address]  Person/Contact Name: Eugenio Johnson  Phone: [phone]     Suicide Risk  Source of information for CSSR: Patient  In use: Yes     Alcohol/week: 0.0 standard drinks     Comment: socially    Drug use: No    Sexual activity: Yes     Partners: Female     Comment: 1 child   Lifestyle    Physical activity     Days per week: Not on file     Minutes per session: Not on file    Stress: Not on file   Relationships    Social connections     Talks on phone: Not on file     Gets together: Not on file     Attends [employer] service: Not on file     Active member of club or organization: Not on file     Attends meetings of clubs or organizations: Not on file     Relationship status: Not on file    Intimate partner violence     Fear of current or ex partner: Not on file     Emotionally abused: Not on file     Physically abused: Not on file     Forced sexual activity: Not on file   Other Topics Concern    Not on file   Social History Narrative    Lives with mother-December 10, 2020        Review of Systems:  A comprehensive review of systems was negative except for what was noted in the HPI. Physical Exam:   Vitals:    12/10/20 1606   BP: 118/82   Pulse: 62   SpO2: 98%   Weight: 246 lb (111.6 kg)   Height: 6' 5\" (1.956 m)     Body mass index is 29.17 kg/m². There were no vitals filed for this visit. Constitutional: He is oriented to person, place, and time. He appears well-developed and well-nourished. No distress. HEENT:   Head: Normocephalic and atraumatic. Right Ear: Tympanic membrane, external ear and ear canal normal.   Left Ear: Tympanic membrane, external ear and ear canal normal.   Nose: Nose normal.   Mouth/Throat: Oropharynx is clear and moist and mucous membranes are normal. No oropharyngeal exudate or posterior oropharyngeal erythema. He has no cervical adenopathy. Eyes: Conjunctivae and extraocular motions are normal. Pupils are equal, round, and reactive to light. Neck: Full passive range of motion without pain. Neck supple. No JVD present. Carotid bruit is not present. No mass and no thyromegaly present. )  Protective Factors: AMADA  Past Suicidal Ideation: Ideation;Method/Plan  Describe: Pt reports that she had thoughts about a year ago to end her life, with thoughts of jumping off of a parking garage in [facility].  She reports that she did identify which p Cardiovascular: Normal rate, regular rhythm, normal heart sounds and intact distal pulses. Exam reveals no gallop and no friction rub. No murmur heard. Pulmonary/Chest: Effort normal and breath sounds normal. No respiratory distress. He has no wheezes, rhonchi or rales. Abdominal: Soft, non-tender. Bowel sounds and aorta are normal. There is no organomegaly, mass or bruit. Musculoskeletal: Normal range of motion, no synovitis. He exhibits no edema. Neurological: He is alert and oriented to person, place, and time. He has normal reflexes. No cranial nerve deficit. Coordination normal.   Skin: Skin is warm, dry and intact. No suspicious lesions are noted. Psychiatric: He has a normal mood and affect. His speech is normal and behavior is normal. Judgment, cognition and memory are normal.     Assessment/Plan:    Tomy was seen today for annual exam.    Diagnoses and all orders for this visit:    Well adult exam  -     GLUCOSE; Future  -     Lipid Panel; Future    Screening for diabetes mellitus  -     GLUCOSE; Future    Screening, lipid  -     Lipid Panel; Future    HSV infection  -     valACYclovir (VALTREX) 500 MG tablet; Take 1 tablet by mouth daily    Screening for malignant neoplasm of prostate  -     Psa screening; Future    Screen for colon cancer  -     Ambulatory referral to Gastroenterology    Need for diphtheria-tetanus-pertussis (Tdap) vaccine  -     Tdap (age 6y and older) IM (BOOSTRIX)    Chronic dyspnea  -     Fawn Osei MD, Pulmonary, [facility]              Patient Instructions   Find an eye dotor on your plan  Schedule a dental exam   Always wear seatbelt   Refer for colonoscopy   Refer to pulmonary for shortness of breath   You can be cleared to return to work once seen by Pulmonary     Patient Education        Well Visit, Ages 25 to 48: Care Instructions  Your Care Instructions     Physical exams can help you stay healthy.  Your doctor has checked your overall health Yes  Self-Injurious Thoughts/Impulses: Sporadic  Describe Self-injurious Thoughts/Impulses: Pt reports that she started having thoughts about a month ago; states that she has every so often   Type of Injuries: Cut  Describe Type of Injuries: Cuts on pt lef and may have suggested ways to take good care of yourself. He or she also may have recommended tests. At home, you can help prevent illness with healthy eating, regular exercise, and other steps. Follow-up care is a key part of your treatment and safety. Be sure to make and go to all appointments, and call your doctor if you are having problems. It's also a good idea to know your test results and keep a list of the medicines you take. How can you care for yourself at home? · Reach and stay at a healthy weight. This will lower your risk for many problems, such as obesity, diabetes, heart disease, and high blood pressure. · Get at least 30 minutes of physical activity on most days of the week. Walking is a good choice. You also may want to do other activities, such as running, swimming, cycling, or playing tennis or team sports. Discuss any changes in your exercise program with your doctor. · Do not smoke or allow others to smoke around you. If you need help quitting, talk to your doctor about stop-smoking programs and medicines. These can increase your chances of quitting for good. · Talk to your doctor about whether you have any risk factors for sexually transmitted infections (STIs). Having one sex partner (who does not have STIs and does not have sex with anyone else) is a good way to avoid these infections. · Use birth control if you do not want to have children at this time. Talk with your doctor about the choices available and what might be best for you. · Protect your skin from too much sun. When you're outdoors from 10 a.m. to 4 p.m., stay in the shade or cover up with clothing and a hat with a wide brim. Wear sunglasses that block UV rays. Even when it's cloudy, put broad-spectrum sunscreen (SPF 30 or higher) on any exposed skin. · See a dentist one or two times a year for checkups and to have your teeth cleaned. · Wear a seat belt in the car.   Follow your doctor's advice about when to have certain History  History of Seclusion/Restraint: No     Alcohol Use  How often do you have a drink containing alcohol? : Never     Illicit and Prescription Drug Use  Which if any illicit/prescription drugs have you used/abused?: Cannabis     Cannabis Use  Age at f tests. These tests can spot problems early. For everyone  · Cholesterol. Have the fat (cholesterol) in your blood tested after age 21. Your doctor will tell you how often to have this done based on your age, family history, or other things that can increase your risk for heart disease. · Blood pressure. Have your blood pressure checked during a routine doctor visit. Your doctor will tell you how often to check your blood pressure based on your age, your blood pressure results, and other factors. · Vision. Talk with your doctor about how often to have a glaucoma test.  · Diabetes. Ask your doctor whether you should have tests for diabetes. · Colon cancer. Your risk for colorectal cancer gets higher as you get older. Some experts say that adults should start regular screening at age 48 and stop at age 76. Others say to start before age 48 or continue after age 76. Talk with your doctor about your risk and when to start and stop screening. For women  · Breast exam and mammogram. Talk to your doctor about when you should have a clinical breast exam and a mammogram. Medical experts differ on whether and how often women under 50 should have these tests. Your doctor can help you decide what is right for you. · Cervical cancer screening test and pelvic exam. Begin with a Pap test at age 24. The test often is part of a pelvic exam. Starting at age 27, you may choose to have a Pap test, an HPV test, or both tests at the same time (called co-testing). Talk with your doctor about how often to have testing. · Tests for sexually transmitted infections (STIs). Ask whether you should have tests for STIs. You may be at risk if you have sex with more than one person, especially if your partners do not wear condoms. For men  · Tests for sexually transmitted infections (STIs). Ask whether you should have tests for STIs.  You may be at risk if you have sex with more than one person, especially if you do not wear a condom. · Testicular cancer exam. Ask your doctor whether you should check your testicles regularly. · Prostate exam. Talk to your doctor about whether you should have a blood test (called a PSA test) for prostate cancer. Experts differ on whether and when men should have this test. Some experts suggest it if you are older than 39 and are -American or have a father or brother who got prostate cancer when he was younger than 72. When should you call for help? Watch closely for changes in your health, and be sure to contact your doctor if you have any problems or symptoms that concern you. Where can you learn more? Go to https://[email or web address]. org and sign in to your [employer] account. Enter P072 in the Ivaldi box to learn more about \"Well Visit, Ages 25 to 48: Care Instructions. \"     If you do not have an account, please click on the \"Sign Up Now\" link. Current as of: May 27, 2020               Content Version: 12.6  © 2006-2020 [employer]. Care instructions adapted under license by [facility] ([facility]). If you have questions about a medical condition or this instruction, always ask your healthcare professional. [place] [address] any warranty or liability for your use of this information. Nayla Guillen MD      Documentation was done using voice recognition dragon software. Every effort was made to ensure accuracy; however, inadvertent, unintentional computerized transcription errors may be present. feel unsafe?: No  Have You Ever Been Harmed by a Partner/Caregiver?: No  Health Concerns r/t Abuse: No  Possible Abuse Reportable to[de-identified] Not appropriate for reporting to authorities     General Appearance  Characteristics: Appropriate clothing;Good hygiene that she wanted to shoot herself with a gun, which pt denies. Pt and father deny access to a gun; per school psychologist, Janes WRIGHT does state that there is a gun in the home that is currently locked up and does not appear to have ammo at this time.  Tatyana Harris behavior; Environmental stress; No current treatment; Access to lethal means;Stressful life events or loss;Sustained stress  Protective Factors: AMADA     Motivational Stage of Change  Motivational Stage of Change: Pre-Contemplative     Level of Care Recommenda

## 2020-12-10 NOTE — PROGRESS NOTES
Behavioral Health Consultation  Arlene Dutta, Ph.D.  Psychologist  12/10/2020   4:26 PM EST       Time spent with Patient: 30 minutes  [facility]. Reason for Consult:    Chief Complaint   Patient presents with    ADHD       Pt provided informed consent for the behavioral health program. Discussed with patient model of service to include the limits of confidentiality (i.e. abuse reporting, suicide  intervention, etc.) and short-term intervention focused approach. Pt indicated understanding. Feedback given to PCP. S:  Pt seen per PCP re: anxiety    Pt seen for f/u. Pt noted continued anxiety related to having trouble with challenges w/ organization, task management, and executive functioning demands. Assessed Pt for ADHD based on these sxs and he met criteria. Specifically endorsed fails to give close attention to details or makes careless mistakes in school, work, or other activities, has difficulty sustaining attention in tasks or play activities, has difficulty organizing tasks and activities, does not follow through on instructions and fails to finish schoolwork, chores, or duties in the workplace, loses things that are necessary for tasks and activities, is easily distracted by extraneous stimuli and avoids engaging in tasks that require sustained attention. Pt noted pattern of this early on in life including difficulty w/ behavior in school when he was bored. Pt described how his intellect has acted as mitigating factor. Focused intervention on task management and restructuring maladaptive thoughts. Coordinated care w/ Dr. Garth Morales who prescribed rx.             O:  MSE:    Appearance: good hygiene   Attitude: cooperative and friendly  Consciousness: alert  Orientation: oriented to person, place, time, general circumstance  Memory: recent and remote memory intact  Attention/Concentration: lost train of thought while speaking  Psychomotor Activity:normal  Eye Contact: normal  Speech: normal rate and volume, well-articulated  Mood: anxious  Affect: congruent  Perception: within normal limits  Thought Content: within normal limits  Thought Process: logical, coherent  Insight: good  Judgment: intact  Ability to understand instructions: Yes  Morbid Ideation: no   Suicide Assessment: no suicidal ideation, plan, or intent  Homicidal Ideation: no     History:    Social History:   Social History     Socioeconomic History    Marital status: Single     Spouse name: Not on file    Number of children: Not on file    Years of education: Not on file    Highest education level: Not on file   Occupational History    Not on file   Social Needs    Financial resource strain: Not on file    Food insecurity     Worry: Not on file     Inability: Not on file    Transportation needs     Medical: Not on file     Non-medical: Not on file   Tobacco Use    Smoking status: Former Smoker     Packs/day: 0.50     Years: 25.00     Pack years: 12.50     Types: Cigarettes     Last attempt to quit: 2015     Years since quittin.2    Smokeless tobacco: Never Used    Tobacco comment: started to smoke at 12 / smoked up to 1 to 1.5 p.p.d / quit smoking 2015   Substance and Sexual Activity    Alcohol use:  Yes     Alcohol/week: 0.0 standard drinks     Comment: socially    Drug use: No    Sexual activity: Yes     Partners: Female     Comment: 1 child   Lifestyle    Physical activity     Days per week: Not on file     Minutes per session: Not on file    Stress: Not on file   Relationships    Social connections     Talks on phone: Not on file     Gets together: Not on file     Attends [employer] service: Not on file     Active member of club or organization: Not on file     Attends meetings of clubs or organizations: Not on file     Relationship status: Not on file    Intimate partner violence     Fear of current or ex partner: Not on file     Emotionally abused: Not on file     Physically abused: Not on file     Forced sexual activity: Not on file   Other Topics Concern    Not on file   Social History Narrative    Not on file     TOBACCO:   reports that he quit smoking about 5 years ago. His smoking use included cigarettes. He has a 12.50 pack-year smoking history. He has never used smokeless tobacco.  ETOH:   reports current alcohol use. A:  Patient engaged and cooperative. denies SI. Insight and motivation are good. Diagnosis:    1. BIJAL (generalized anxiety disorder)    2. Attention deficit hyperactivity disorder (ADHD), predominantly inattentive type          Plan:    Patient Instructions   1) Write everything that you need to do on a list then prioritize 1= in the next month, 2= in the 2-3 months, and 3= in the next years    2) Focus on what is in your control not what is out of your control. Pt interventions:      No follow-ups on file. Documentation was done using voice recognition dragon software. Every effort was made to ensure accuracy; however, inadvertent, unintentional computerized transcription errors may be present.

## 2020-12-10 NOTE — PATIENT INSTRUCTIONS
Find an eye dotor on your plan  Schedule a dental exam   Always wear seatbelt   Refer for colonoscopy   Refer to pulmonary for shortness of breath   You can be cleared to return to work once seen by Pulmonary     Patient Education        Well Visit, Ages 25 to 48: Care Instructions  Your Care Instructions     Physical exams can help you stay healthy. Your doctor has checked your overall health and may have suggested ways to take good care of yourself. He or she also may have recommended tests. At home, you can help prevent illness with healthy eating, regular exercise, and other steps. Follow-up care is a key part of your treatment and safety. Be sure to make and go to all appointments, and call your doctor if you are having problems. It's also a good idea to know your test results and keep a list of the medicines you take. How can you care for yourself at home? · Reach and stay at a healthy weight. This will lower your risk for many problems, such as obesity, diabetes, heart disease, and high blood pressure. · Get at least 30 minutes of physical activity on most days of the week. Walking is a good choice. You also may want to do other activities, such as running, swimming, cycling, or playing tennis or team sports. Discuss any changes in your exercise program with your doctor. · Do not smoke or allow others to smoke around you. If you need help quitting, talk to your doctor about stop-smoking programs and medicines. These can increase your chances of quitting for good. · Talk to your doctor about whether you have any risk factors for sexually transmitted infections (STIs). Having one sex partner (who does not have STIs and does not have sex with anyone else) is a good way to avoid these infections. · Use birth control if you do not want to have children at this time. Talk with your doctor about the choices available and what might be best for you. · Protect your skin from too much sun.  When you're outdoors from 10 a.m. to 4 p.m., stay in the shade or cover up with clothing and a hat with a wide brim. Wear sunglasses that block UV rays. Even when it's cloudy, put broad-spectrum sunscreen (SPF 30 or higher) on any exposed skin. · See a dentist one or two times a year for checkups and to have your teeth cleaned. · Wear a seat belt in the car. Follow your doctor's advice about when to have certain tests. These tests can spot problems early. For everyone  · Cholesterol. Have the fat (cholesterol) in your blood tested after age 21. Your doctor will tell you how often to have this done based on your age, family history, or other things that can increase your risk for heart disease. · Blood pressure. Have your blood pressure checked during a routine doctor visit. Your doctor will tell you how often to check your blood pressure based on your age, your blood pressure results, and other factors. · Vision. Talk with your doctor about how often to have a glaucoma test.  · Diabetes. Ask your doctor whether you should have tests for diabetes. · Colon cancer. Your risk for colorectal cancer gets higher as you get older. Some experts say that adults should start regular screening at age 48 and stop at age 76. Others say to start before age 48 or continue after age 76. Talk with your doctor about your risk and when to start and stop screening. For women  · Breast exam and mammogram. Talk to your doctor about when you should have a clinical breast exam and a mammogram. Medical experts differ on whether and how often women under 50 should have these tests. Your doctor can help you decide what is right for you. · Cervical cancer screening test and pelvic exam. Begin with a Pap test at age 24. The test often is part of a pelvic exam. Starting at age 27, you may choose to have a Pap test, an HPV test, or both tests at the same time (called co-testing). Talk with your doctor about how often to have testing.   · Tests for sexually transmitted infections (STIs). Ask whether you should have tests for STIs. You may be at risk if you have sex with more than one person, especially if your partners do not wear condoms. For men  · Tests for sexually transmitted infections (STIs). Ask whether you should have tests for STIs. You may be at risk if you have sex with more than one person, especially if you do not wear a condom. · Testicular cancer exam. Ask your doctor whether you should check your testicles regularly. · Prostate exam. Talk to your doctor about whether you should have a blood test (called a PSA test) for prostate cancer. Experts differ on whether and when men should have this test. Some experts suggest it if you are older than 39 and are -American or have a father or brother who got prostate cancer when he was younger than 72. When should you call for help? Watch closely for changes in your health, and be sure to contact your doctor if you have any problems or symptoms that concern you. Where can you learn more? Go to https://[employer].healthNetviewer. org and sign in to your [employer] account. Enter P072 in the [employer] box to learn more about \"Well Visit, Ages 25 to 48: Care Instructions. \"     If you do not have an account, please click on the \"Sign Up Now\" link. Current as of: May 27, 2020               Content Version: 12.6  © 5473-4870 [employer]. Care instructions adapted under license by [facility] ([facility]). If you have questions about a medical condition or this instruction, always ask your healthcare professional. [place] [address] any warranty or liability for your use of this information.

## 2020-12-15 ENCOUNTER — TELEPHONE (OUTPATIENT)
Dept: INTERNAL MEDICINE CLINIC | Age: 45
End: 2020-12-15

## 2020-12-15 NOTE — TELEPHONE ENCOUNTER
Pt needs his medical records from 11/11/2020 to present in order to be approved for short term disability. Pt said he will not be paid w/o those medical records being forwarded to his employer. He is asking if it is possible to print his OV notes from those dates ASA, so that he can pick them up to take to his HR dept himself.

## 2020-12-16 NOTE — TELEPHONE ENCOUNTER
Records printed and given to pt. Also wants copies faxed or e-mailed to [employer] [employer]. Fax [phone]  E-mail [email or web address]

## 2021-01-05 DIAGNOSIS — Z13.1 SCREENING FOR DIABETES MELLITUS: ICD-10-CM

## 2021-01-05 DIAGNOSIS — Z13.220 SCREENING, LIPID: ICD-10-CM

## 2021-01-05 DIAGNOSIS — G25.81 RLS (RESTLESS LEGS SYNDROME): ICD-10-CM

## 2021-01-05 DIAGNOSIS — Z12.5 SCREENING FOR MALIGNANT NEOPLASM OF PROSTATE: ICD-10-CM

## 2021-01-05 DIAGNOSIS — Z00.00 WELL ADULT EXAM: ICD-10-CM

## 2021-01-05 LAB
CHOLESTEROL, TOTAL: 206 MG/DL (ref 0–199)
FERRITIN: 104.4 NG/ML (ref 30–400)
GLUCOSE BLD-MCNC: 101 MG/DL (ref 70–99)
HDLC SERPL-MCNC: 42 MG/DL (ref 40–60)
IRON SATURATION: 38 % (ref 20–50)
IRON: 122 UG/DL (ref 59–158)
LDL CHOLESTEROL CALCULATED: 146 MG/DL
PROSTATE SPECIFIC ANTIGEN: 0.56 NG/ML (ref 0–4)
TOTAL IRON BINDING CAPACITY: 321 UG/DL (ref 260–445)
TRIGL SERPL-MCNC: 92 MG/DL (ref 0–150)
TSH REFLEX: 1.82 UIU/ML (ref 0.27–4.2)
VLDLC SERPL CALC-MCNC: 18 MG/DL

## 2021-01-06 ENCOUNTER — OFFICE VISIT (OUTPATIENT)
Dept: PULMONOLOGY | Age: 46
End: 2021-01-06
Payer: COMMERCIAL

## 2021-01-06 VITALS
TEMPERATURE: 97.1 F | HEIGHT: 78 IN | HEART RATE: 51 BPM | BODY MASS INDEX: 28.93 KG/M2 | WEIGHT: 250 LBS | OXYGEN SATURATION: 99 %

## 2021-01-06 DIAGNOSIS — F41.1 GAD (GENERALIZED ANXIETY DISORDER): ICD-10-CM

## 2021-01-06 DIAGNOSIS — J45.40 ASTHMA, MODERATE PERSISTENT, POORLY-CONTROLLED: Primary | ICD-10-CM

## 2021-01-06 DIAGNOSIS — Z86.711 HISTORY OF PULMONARY EMBOLISM: ICD-10-CM

## 2021-01-06 PROCEDURE — G8484 FLU IMMUNIZE NO ADMIN: HCPCS | Performed by: INTERNAL MEDICINE

## 2021-01-06 PROCEDURE — G8417 CALC BMI ABV UP PARAM F/U: HCPCS | Performed by: INTERNAL MEDICINE

## 2021-01-06 PROCEDURE — 1036F TOBACCO NON-USER: CPT | Performed by: INTERNAL MEDICINE

## 2021-01-06 PROCEDURE — 99214 OFFICE O/P EST MOD 30 MIN: CPT | Performed by: INTERNAL MEDICINE

## 2021-01-06 PROCEDURE — G8427 DOCREV CUR MEDS BY ELIG CLIN: HCPCS | Performed by: INTERNAL MEDICINE

## 2021-01-06 RX ORDER — BUDESONIDE AND FORMOTEROL FUMARATE DIHYDRATE 160; 4.5 UG/1; UG/1
2 AEROSOL RESPIRATORY (INHALATION) 2 TIMES DAILY
Qty: 1 INHALER | Refills: 6 | Status: SHIPPED | OUTPATIENT
Start: 2021-01-06 | End: 2022-03-21 | Stop reason: ALTCHOICE

## 2021-01-06 RX ORDER — ALBUTEROL SULFATE 90 UG/1
2 AEROSOL, METERED RESPIRATORY (INHALATION) EVERY 4 HOURS PRN
Qty: 1 INHALER | Refills: 11 | Status: SHIPPED | OUTPATIENT
Start: 2021-01-06 | End: 2022-09-16

## 2021-01-06 RX ORDER — PREDNISONE 20 MG/1
40 TABLET ORAL DAILY
Qty: 14 TABLET | Refills: 0 | Status: SHIPPED | OUTPATIENT
Start: 2021-01-06 | End: 2021-01-28 | Stop reason: SDUPTHER

## 2021-01-06 NOTE — PROGRESS NOTES
[facility] Pulmonary and Critical Care    Outpatient Initial Note    Subjective:   CHIEF COMPLAINT / HPI:     The patient is 54 y.o. male who presents today for a new patient visit for evaluation of dyspnea, chest tightness, chest congestion. Patient has a history of recurrent PE most recently in 2018 currently on Eliquis. He also has a history of pleurisy and was diagnosed with asthma back in 2019 and was started on Breo. He states that made him cough and he no longer uses it. His dyspnea is worse with exertion but he can have it at rest.  Over the last year it is worsened in severity and frequency. It is accompanied by some chest tightness and chest congestion. He has an occasional cough but mainly dry. He cannot give a description of the mucus. He has some postnasal drip. Past Medical History:    Past Medical History:   Diagnosis Date    Collar bone fracture     BIJAL (generalized anxiety disorder)     History of appendectomy     Migraine     Moderate episode of recurrent major depressive disorder ([facility] [address] 5/1/2018    Pulmonary emboli ([facility] [address]     2017, 2018       Social History:    Works at [employer]   Smoked 1 PPD x 30 yrs - quit 5 yrs ago    Family History:  PE  DM II    Current Medications:  Current Outpatient Medications on File Prior to Visit   Medication Sig Dispense Refill    Lisdexamfetamine Dimesylate (VYVANSE) 40 MG CAPS Take 1 capsule by mouth daily for 30 days. 30 capsule 0    valACYclovir (VALTREX) 500 MG tablet Take 1 tablet by mouth daily 30 tablet 5    apixaban (ELIQUIS) 5 MG TABS tablet TAKE 1 TABLET BY MOUTH TWICE DAILY 60 tablet 11    citalopram (CELEXA) 20 MG tablet TAKE 1 TABLET BY MOUTH DAILY 90 tablet 3     No current facility-administered medications on file prior to visit.         REVIEW OF SYSTEMS:    CONSTITUTIONAL: Negative for fevers and chills  HEENT: Negative for oropharyngeal exudate, post nasal drip, sinus pain / pressure, nasal congestion, ear pain  RESPIRATORY:  See HPI  CARDIOVASCULAR: Negative for chest pain, palpitations, edema  GASTROINTESTINAL: Negative for nausea, vomiting, diarrhea, constipation and abdominal pain  GENITOURINARY: Negative for dysuria, urinary frequency, urinary hesitancy  HEMATOLOGICAL: Negative for adenopathy  SKIN: Negative for clubbing, cyanosis, skin lesions  ENDOCRINE: Negative for polyuria, polydipsia, heat intolerance, cold intolerance   EXTREMITIES: Negative for weakness or decreased ROM in all extremities  NEUROLOGICAL: Negative for unilateral weakness, speech or gait abnormalities    Objective:   PHYSICAL EXAM:        VITALS:  Pulse 51   Temp 97.1 °F (36.2 °C) (Infrared)   Ht 6' 6\" (1.981 m)   Wt 250 lb (113.4 kg)   SpO2 99%   BMI 28.89 kg/m²     CONSTITUTIONAL:  Awake, alert, cooperative, no apparent distress, and appears stated age  HEENT: No oropharyngeal exudate, PERRL, no cervical adenopathy, no tracheal deviation, thyroid size normal  LUNGS:  No increased work of breathing and clear to auscultation, no crackles or wheezing  CARDIOVASCULAR:  normal S1 and S2 and no JVD  ABDOMEN:  Normal bowel sounds, non-distended and non-tender to palpation  EXT: No edema, no calf tenderness. Pulses are present bilaterally. NEUROLOGIC:  Mental Status Exam:  Level of Alertness:   awake  Orientation:   person, place, time. SKIN:  normal skin color, texture, turgor, no redness, warmth, or swelling     DATA:      Radiology Review:  Pertinent images / reports were reviewed as a part of this visit. CTPA 11/3/2020 reveals the following:  FINDINGS:  PULMONARY ARTERIES:  Enhance normally without filling defect or other evidence of pulmonary embolism  LUNGS/AIRWAYS:  Unremarkable. No air space disease or mass  PLEURA: Unremarkable. No pleural effusion or pneumothorax  MEDIASTINUM/HONORIO:  Unremarkable  HEART/PERICARDIUM:  Unremarkable  VESSELS:  Unremarkable.   No aneurysm  CHEST WALL/LOWER NECK:  Unremarkable  UPPER ABDOMEN: Unremarkable  BONES:  Unremarkable  OTHER:  None      IMPRESSION      Normal chest CTA. No evidence of pulmonary embolus. Status     Last PFTs:  DATE OF PROCEDURE:  01/22/2019     Forced vital capacity and FEV1 and FEV1 to FVC ratio normal.  [id number]  moderately reduced. After inhaled bronchodilator, mid to late  expiratory flow was improved. Total lung capacity and FRC normal, RV  increased. Single-breath diffusion capacity is normal.     IMPRESSION:  Reduction of mid to late expiratory flow rates consistent  with moderate small airways dysfunction, improved after inhaled  bronchodilator. Finding is consistent with mild asthma, history of  smoking, post travel bronchitis. There is mild air trapping associated  with this. Remainder of pulmonary function study is normal.     Assessment:      Diagnosis Orders   1. Asthma, moderate persistent, poorly-controlled     2. History of pulmonary embolism     3. BIJAL (generalized anxiety disorder)         Plan:   1. Start Symbicort 160 mcg MDI 2 p BID  2. Start albuterol MDI 2 p q 4 hrs prn dyspnea  3. Prednisone 40 mg daily x 7 days  4. Agree with PSG  5.  RTC 4 weeks

## 2021-01-07 ENCOUNTER — OFFICE VISIT (OUTPATIENT)
Dept: INTERNAL MEDICINE CLINIC | Age: 46
End: 2021-01-07
Payer: COMMERCIAL

## 2021-01-07 ENCOUNTER — OFFICE VISIT (OUTPATIENT)
Dept: PSYCHOLOGY | Age: 46
End: 2021-01-07
Payer: COMMERCIAL

## 2021-01-07 VITALS
TEMPERATURE: 97 F | SYSTOLIC BLOOD PRESSURE: 128 MMHG | OXYGEN SATURATION: 97 % | BODY MASS INDEX: 28.77 KG/M2 | HEART RATE: 60 BPM | DIASTOLIC BLOOD PRESSURE: 70 MMHG | WEIGHT: 249 LBS

## 2021-01-07 DIAGNOSIS — F33.1 MODERATE EPISODE OF RECURRENT MAJOR DEPRESSIVE DISORDER (HCC): Chronic | ICD-10-CM

## 2021-01-07 DIAGNOSIS — Z30.09 VASECTOMY EVALUATION: ICD-10-CM

## 2021-01-07 DIAGNOSIS — F41.1 GAD (GENERALIZED ANXIETY DISORDER): ICD-10-CM

## 2021-01-07 DIAGNOSIS — F90.0 ATTENTION DEFICIT HYPERACTIVITY DISORDER (ADHD), PREDOMINANTLY INATTENTIVE TYPE: Primary | ICD-10-CM

## 2021-01-07 DIAGNOSIS — F41.1 GAD (GENERALIZED ANXIETY DISORDER): Primary | Chronic | ICD-10-CM

## 2021-01-07 PROCEDURE — G8417 CALC BMI ABV UP PARAM F/U: HCPCS | Performed by: INTERNAL MEDICINE

## 2021-01-07 PROCEDURE — 1036F TOBACCO NON-USER: CPT | Performed by: PSYCHOLOGIST

## 2021-01-07 PROCEDURE — 1036F TOBACCO NON-USER: CPT | Performed by: INTERNAL MEDICINE

## 2021-01-07 PROCEDURE — 99214 OFFICE O/P EST MOD 30 MIN: CPT | Performed by: INTERNAL MEDICINE

## 2021-01-07 PROCEDURE — G8427 DOCREV CUR MEDS BY ELIG CLIN: HCPCS | Performed by: INTERNAL MEDICINE

## 2021-01-07 PROCEDURE — G8484 FLU IMMUNIZE NO ADMIN: HCPCS | Performed by: INTERNAL MEDICINE

## 2021-01-07 PROCEDURE — 90832 PSYTX W PT 30 MINUTES: CPT | Performed by: PSYCHOLOGIST

## 2021-01-07 NOTE — PROGRESS NOTES
[address]  Phone: [phone]           Patient Name: Steven Kunz    YOB: 1975    Today's Date: 1/7/21           Chief Complaint   Patient presents with    ADHD          Subjective:  ADD/ADHD:  Current treatment: Vyvanse- 40 mg, which has been somewhat effective. Residual symptoms: inattention, academic underachievement. Medication side effects: None. Patient denies None. Independent historian required? no    Objective:    Vitals:    01/07/21 1102   BP: 128/70   Site: Right Upper Arm   Position: Sitting   Cuff Size: Large Adult   Pulse: 60   Temp: 97 °F (36.1 °C)   TempSrc: Infrared   SpO2: 97%   Weight: 249 lb (112.9 kg)     Wt Readings from Last 3 Encounters:   01/07/21 249 lb (112.9 kg)   01/06/21 250 lb (113.4 kg)   12/10/20 246 lb (111.6 kg)       Body mass index is 28.77 kg/m². Physical Exam  Constitutional:       Appearance: Normal appearance. HENT:      Right Ear: Tympanic membrane and ear canal normal.      Left Ear: Tympanic membrane and ear canal normal.      Nose: Nose normal. No congestion or rhinorrhea. Mouth/Throat:      Mouth: Mucous membranes are moist.      Pharynx: No oropharyngeal exudate. Eyes:      General:         Right eye: No discharge. Left eye: No discharge. Pupils: Pupils are equal, round, and reactive to light. Neck:      Musculoskeletal: Normal range of motion and neck supple. No neck rigidity or muscular tenderness. Cardiovascular:      Rate and Rhythm: Normal rate and regular rhythm. Pulmonary:      Effort: Pulmonary effort is normal. No respiratory distress. Breath sounds: No stridor. No wheezing or rhonchi. Abdominal:      General: Abdomen is flat. There is no distension. Palpations: There is no mass. Tenderness: There is no abdominal tenderness. There is no guarding or rebound. Hernia: No hernia is present. Neurological:      Mental Status: He is alert. Labs Reviewed: no    Imaging Reviewed: no      Records Reviewed: yes - Dr. Elton Nesbitt note and Dr. Chantelle Thompson note    Assessment:    1. Attention deficit hyperactivity disorder (ADHD), predominantly inattentive type  Worsening - will need to increase the dose  - lisdexamfetamine (VYVANSE) 50 MG capsule; Take 1 capsule by mouth every morning for 30 days. Dispense: 30 capsule; Refill: 0  - lisdexamfetamine (VYVANSE) 50 MG capsule; Take 1 capsule by mouth every morning for 30 days. Dispense: 30 capsule; Refill: 0  - lisdexamfetamine (VYVANSE) 50 MG capsule; Take 1 capsule by mouth every morning for 30 days. Dispense: 30 capsule; Refill: 0    2. BIJAL (generalized anxiety disorder)  Improved  -  Continue therapy with Dr. Saniya Whalen  -  Continue citalopram    3. Vasectomy evaluation  - DARCY - Rhea Maya MD, [facility], [facility]        Plan/Patient Instructions: There are no Patient Instructions on file for this visit. Return in about 2 months (around 3/7/2021) for adhd. [address] [employer]       Documentation was done using voice recognition dragon software. Every effort was made to ensure accuracy; however, inadvertent, unintentional computerized transcription errors may be present.

## 2021-01-07 NOTE — PROGRESS NOTES
Behavioral Health Consultation  Nelida Cuello, Ph.D.  Psychologist  1/7/2021   4:26 PM EST       Time spent with Patient: 30 minutes  This is patient's first [facility] appointment. Reason for Consult:    Chief Complaint   Patient presents with    Anxiety       Pt provided informed consent for the behavioral health program. Discussed with patient model of service to include the limits of confidentiality (i.e. abuse reporting, suicide  intervention, etc.) and short-term intervention focused approach. Pt indicated understanding. Feedback given to PCP. S:  Pt seen per PCP re: anxiety    Pt seen for f/u. Patient reported that he has been having a difficult time managing his mood due to increased environmental stress. Patient reported that his 3year-old child's mother told him that she is pregnant with patient's child. Patient is in a relationship with someone else. Patient is unsure of whether he wants to be in a relationship with this other woman. Patient noted how this since urgency affects his stress. Focused intervention on restructuring, perspective taking, decisional balance, and discussion of problem focused coping.   Introduced and continue to discussed values to help increase engagement and motivation for behavior change        O:  MSE:    Appearance: good hygiene   Attitude: cooperative and friendly  Consciousness: alert  Orientation: oriented to person, place, time, general circumstance  Memory: recent and remote memory intact  Attention/Concentration: lost train of thought while speaking  Psychomotor Activity:normal  Eye Contact: normal  Speech: normal rate and volume, well-articulated  Mood: anxious  Affect: congruent  Perception: within normal limits  Thought Content: within normal limits  Thought Process: logical, coherent  Insight: good  Judgment: intact  Ability to understand instructions: Yes  Morbid Ideation: no   Suicide Assessment: no suicidal ideation, plan, or intent  Homicidal Ideation: no History:    Social History:   Social History     Socioeconomic History    Marital status: Single     Spouse name: Not on file    Number of children: 2    Years of education: Not on file    Highest education level: Not on file   Occupational History    Occupation: [employer]    Financial resource strain: Not on file    Food insecurity     Worry: Not on file     Inability: Not on file   [employer] needs     Medical: Not on file     Non-medical: Not on file   Tobacco Use    Smoking status: Former Smoker     Packs/day: 0.50     Years: 25.00     Pack years: 12.50     Types: Cigarettes     Quit date: 2015     Years since quittin.3    Smokeless tobacco: Never Used    Tobacco comment: started to smoke at 12 / smoked up to 1 to 1.5 p.p.d / quit smoking 2015   Substance and Sexual Activity    Alcohol use: Yes     Alcohol/week: 0.0 standard drinks     Comment: socially    Drug use: No    Sexual activity: Yes     Partners: Female     Comment: 1 child   Lifestyle    Physical activity     Days per week: Not on file     Minutes per session: Not on file    Stress: Not on file   Relationships    Social connections     Talks on phone: Not on file     Gets together: Not on file     Attends [employer] service: Not on file     Active member of club or organization: Not on file     Attends meetings of clubs or organizations: Not on file     Relationship status: Not on file    Intimate partner violence     Fear of current or ex partner: Not on file     Emotionally abused: Not on file     Physically abused: Not on file     Forced sexual activity: Not on file   Other Topics Concern    Not on file   Social History Narrative    Lives with mother-December 10, 2020      TOBACCO:   reports that he quit smoking about 5 years ago. His smoking use included cigarettes. He has a 12.50 pack-year smoking history.  He has never used smokeless tobacco.  ETOH:   reports current alcohol use.    A:  Patient engaged and cooperative. denies SI. Insight and motivation are good. Diagnosis:    1. BIJAL (generalized anxiety disorder)    2. Moderate episode of recurrent major depressive disorder ([facility] [address]          Plan:    Patient Instructions   1) Choose 5 values that are most important to you. Bring this in next time. 2) Make a list of 15 qualities you need in a relationship. 3) Talk to your moms PCP about home health. A Quick Look at Your Values   Values are your hearts deepest desires for how you want to behave as a human being. Values are not about what you want to get or achieve; they are about how you want to behave or act on an ongoing basis. There are literally hundreds of different values, but below youll find a list of the most common ones. Probably, not all of them will be relevant to you. Keep in mind there are no such things as right values or wrong values. Its a bit like our taste in pizzas. If you prefer ham and pineapple but I prefer salami and olives, that doesnt mean that my taste in pizzas is right and yours is wrong. It just means we have different tastes. And similarly, we may have different values. So read through the list below and write a letter next to each value: V = Very  important, Q = Quite important, and N = Not so important; and make sure to score at least ten of them as Very important. 1. Acceptance: to be open to and accepting of myself, others, life etc  2. Adventure: to be adventurous; to actively seek, create, or explore novel or stimulating  experiences  3. Assertiveness: to respectfully stand up for my rights and request what I want  4. Authenticity: to be authentic, genuine, real; to be true to myself  5. Beauty: to appreciate, create, nurture or cultivate beauty in myself, others, the  environment etc  6. Caring: to be caring towards myself, others, the environment etc  7. Challenge: to keep challenging myself to grow, learn, improve  8. Compassion: to act with kindness towards those who are suffering  9. Connection: to engage fully in whatever I am doing, and be fully present with others  10. Contribution: to contribute, help, assist, or make a positive difference to myself or  others  11. Conformity: to be respectful and obedient of rules and obligations  12. Cooperation: to be cooperative and collaborative with others  15. Courage: to be courageous or brave; to persist in the face of fear, threat, or difficulty  14. Creativity: to be creative or innovative  15. Curiosity: to be curious, open-minded and interested; to explore and discover  16. Encouragement: to encourage and reward behaviour that I value in myself or others  17. [place]: to treat others as equal to myself, and vice-versa  18. Excitement: to seek, create and engage in activities that are exciting, stimulating or  thrilling  19. Fairness: to be fair to myself or others  20. Fitness: to maintain or improve my fitness; to look after my physical and mental  health and wellbeing  21. Flexibility: to adjust and adapt readily to changing circumstances  22. Freedom: to live freely; to choose how I live and behave, or help others do likewise  23. Friendliness: to be friendly, companionable, or agreeable towards others  24. Forgiveness: to be forgiving towards myself or others  25. Fun: to be fun-loving; to seek, create, and engage in fun-filled activities  26. Generosity: to be generous, sharing and giving, to myself or others  27. Gratitude: to be grateful for and appreciative of the positive aspects of myself, others  and life  28. Honesty: to be honest, truthful, and sincere with myself and others  29. Humour: to see and appreciate the humorous side of life  30. Humility: to be humble or modest; to let my achievements speak for themselves  31. Industry: to be industrious, hard-working, dedicated  28. [place]: to be self-supportive, and choose my own way of doing things  33. Intimacy: to open up, reveal, and share myself -- emotionally or physically - in my  close personal relationships  29. Justice: to uphold justice and fairness  35. Kindness: to be kind, compassionate, considerate, nurturing or caring towards myself  or others  39. Love: to act lovingly or affectionately towards myself or others  40. Mindfulness: to be conscious of, open to, and curious about my here-and-now  experience  45. Order: to be orderly and organized  44. Open-mindedness: to think things through, see things from others points of view, and  weigh evidence fairly. 36. Patience: to wait calmly for what I want  41. Persistence: to continue resolutely, despite problems or difficulties. 42. Pleasure: to create and give pleasure to myself or others  43. Power: to strongly influence or wield authority over others, e.g. taking charge,  leading, organizing  44. Reciprocity: to build relationships in which there is a fair balance of giving and taking  45. Respect: to be respectful towards myself or others; to be polite, considerate and show  positive regard  46. Responsibility: to be responsible and accountable for my actions  47. Romance: to be romantic; to display and express love or strong affection  50. Safety: to secure, protect, or ensure safety of myself or others  49. Self-awareness: to be aware of my own thoughts, feelings and actions  50. Self-care: to look after my health and wellbeing, and get my needs met  51. Self-development: to keep growing, advancing or improving in knowledge, skills,  character, or life experience. 52. Self-control: to act in accordance with my own ideals  48. Sensuality: to create, explore and enjoy experiences that stimulate the five senses  54. Sexuality: to explore or express my sexuality  54. Spirituality: to connect with things bigger than myself  56. Skilfulness: to continually practice and improve my skills, and apply myself fully  when using them  57.  Supportiveness: to be supportive, helpful, encouraging, and available to myself or  others  58. Trust: to be trustworthy; to be loyal, faithful, sincere, and reliable  59. Insert your own unlisted value here:  60. Insert your own unlisted value here:    Once youve marked each value as V, Q, N (Very, Quite, or Not so important), go through all the Vs, and select out the top six that are most important to you. Navneet each one with a 6, to show its in your top six. Finally, write those six values out below, to remind yourself this is what you want to stand for as a human being. From: https://[employer]/upimages/Complete_Worksheets_2014. pdf        Pt interventions: Focused intervention on restructuring, perspective taking, decisional balance, and discussion of problem focused coping. Introduced and continue to discussed values to help increase engagement and motivation for behavior change    No follow-ups on file. Documentation was done using voice recognition dragon software. Every effort was made to ensure accuracy; however, inadvertent, unintentional computerized transcription errors may be present.

## 2021-01-07 NOTE — PATIENT INSTRUCTIONS
1) Choose 5 values that are most important to you. Bring this in next time. 2) Make a list of 15 qualities you need in a relationship. 3) Talk to your moms PCP about home health. A Quick Look at Your Values   Values are your hearts deepest desires for how you want to behave as a human being. Values are not about what you want to get or achieve; they are about how you want to behave or act on an ongoing basis. There are literally hundreds of different values, but below youll find a list of the most common ones. Probably, not all of them will be relevant to you. Keep in mind there are no such things as right values or wrong values. Its a bit like our taste in pizzas. If you prefer ham and pineapple but I prefer salami and olives, that doesnt mean that my taste in pizzas is right and yours is wrong. It just means we have different tastes. And similarly, we may have different values. So read through the list below and write a letter next to each value: V = Very  important, Q = Quite important, and N = Not so important; and make sure to score at least ten of them as Very important. 1. Acceptance: to be open to and accepting of myself, others, life etc  2. Adventure: to be adventurous; to actively seek, create, or explore novel or stimulating  experiences  3. Assertiveness: to respectfully stand up for my rights and request what I want  4. Authenticity: to be authentic, genuine, real; to be true to myself  5. Beauty: to appreciate, create, nurture or cultivate beauty in myself, others, the  environment etc  6. Caring: to be caring towards myself, others, the environment etc  7. Challenge: to keep challenging myself to grow, learn, improve  8. Compassion: to act with kindness towards those who are suffering  9. Connection: to engage fully in whatever I am doing, and be fully present with others  10. Contribution: to contribute, help, assist, or make a positive difference to myself or  others  11. Conformity: to be respectful and obedient of rules and obligations  12. Cooperation: to be cooperative and collaborative with others  15. Courage: to be courageous or brave; to persist in the face of fear, threat, or difficulty  14. Creativity: to be creative or innovative  15. Curiosity: to be curious, open-minded and interested; to explore and discover  16. Encouragement: to encourage and reward behaviour that I value in myself or others  17. [place]: to treat others as equal to myself, and vice-versa  18. Excitement: to seek, create and engage in activities that are exciting, stimulating or  thrilling  19. Fairness: to be fair to myself or others  20. Fitness: to maintain or improve my fitness; to look after my physical and mental  health and wellbeing  21. Flexibility: to adjust and adapt readily to changing circumstances  22. Freedom: to live freely; to choose how I live and behave, or help others do likewise  23. Friendliness: to be friendly, companionable, or agreeable towards others  24. Forgiveness: to be forgiving towards myself or others  25. Fun: to be fun-loving; to seek, create, and engage in fun-filled activities  26. Generosity: to be generous, sharing and giving, to myself or others  27. Gratitude: to be grateful for and appreciative of the positive aspects of myself, others  and life  28. Honesty: to be honest, truthful, and sincere with myself and others  29. Humour: to see and appreciate the humorous side of life  30. Humility: to be humble or modest; to let my achievements speak for themselves  31. Industry: to be industrious, hard-working, dedicated  28. [place]: to be self-supportive, and choose my own way of doing things  33. Intimacy: to open up, reveal, and share myself -- emotionally or physically - in my  close personal relationships  29. Justice: to uphold justice and fairness  35. Kindness: to be kind, compassionate, considerate, nurturing or caring towards myself  or others  39. Love: to act lovingly or affectionately towards myself or others  40. Mindfulness: to be conscious of, open to, and curious about my here-and-now  experience  45. Order: to be orderly and organized  44. Open-mindedness: to think things through, see things from others points of view, and  weigh evidence fairly. 36. Patience: to wait calmly for what I want  41. Persistence: to continue resolutely, despite problems or difficulties. 42. Pleasure: to create and give pleasure to myself or others  43. Power: to strongly influence or wield authority over others, e.g. taking charge,  leading, organizing  44. Reciprocity: to build relationships in which there is a fair balance of giving and taking  45. Respect: to be respectful towards myself or others; to be polite, considerate and show  positive regard  46. Responsibility: to be responsible and accountable for my actions  47. Romance: to be romantic; to display and express love or strong affection  50. Safety: to secure, protect, or ensure safety of myself or others  49. Self-awareness: to be aware of my own thoughts, feelings and actions  50. Self-care: to look after my health and wellbeing, and get my needs met  51. Self-development: to keep growing, advancing or improving in knowledge, skills,  character, or life experience. 52. Self-control: to act in accordance with my own ideals  48. Sensuality: to create, explore and enjoy experiences that stimulate the five senses  54. Sexuality: to explore or express my sexuality  54. Spirituality: to connect with things bigger than myself  56. Skilfulness: to continually practice and improve my skills, and apply myself fully  when using them  57. Supportiveness: to be supportive, helpful, encouraging, and available to myself or  others  62. Trust: to be trustworthy; to be loyal, faithful, sincere, and reliable  59.  Insert your own unlisted value here:  60. Insert your own unlisted value here:    Once youve marked each value

## 2021-01-12 ENCOUNTER — OFFICE VISIT (OUTPATIENT)
Dept: PSYCHOLOGY | Age: 46
End: 2021-01-12
Payer: COMMERCIAL

## 2021-01-12 DIAGNOSIS — F41.1 GAD (GENERALIZED ANXIETY DISORDER): Primary | ICD-10-CM

## 2021-01-12 PROCEDURE — 1036F TOBACCO NON-USER: CPT | Performed by: PSYCHOLOGIST

## 2021-01-12 PROCEDURE — 90832 PSYTX W PT 30 MINUTES: CPT | Performed by: PSYCHOLOGIST

## 2021-01-12 NOTE — PROGRESS NOTES
Socioeconomic History    Marital status: Single     Spouse name: Not on file    Number of children: 2    Years of education: Not on file    Highest education level: Not on file   Occupational History    Occupation: [employer]    Social Needs    Financial resource strain: Not on file    Food insecurity     Worry: Not on file     Inability: Not on file   [employer] needs     Medical: Not on file     Non-medical: Not on file   Tobacco Use    Smoking status: Former Smoker     Packs/day: 0.50     Years: 25.00     Pack years: 12.50     Types: Cigarettes     Quit date: 2015     Years since quittin.3    Smokeless tobacco: Never Used    Tobacco comment: started to smoke at 12 / smoked up to 1 to 1.5 p.p.d / quit smoking 2015   Substance and Sexual Activity    Alcohol use: Yes     Alcohol/week: 0.0 standard drinks     Comment: socially    Drug use: No    Sexual activity: Yes     Partners: Female     Comment: 1 child   Lifestyle    Physical activity     Days per week: Not on file     Minutes per session: Not on file    Stress: Not on file   Relationships    Social connections     Talks on phone: Not on file     Gets together: Not on file     Attends Christianity service: Not on file     Active member of club or organization: Not on file     Attends meetings of clubs or organizations: Not on file     Relationship status: Not on file    Intimate partner violence     Fear of current or ex partner: Not on file     Emotionally abused: Not on file     Physically abused: Not on file     Forced sexual activity: Not on file   Other Topics Concern    Not on file   Social History Narrative    Lives with mother-December 10, 2020      TOBACCO:   reports that he quit smoking about 5 years ago. His smoking use included cigarettes. He has a 12.50 pack-year smoking history. He has never used smokeless tobacco.  ETOH:   reports current alcohol use. A:  Patient engaged and cooperative.  sharleneies SI. Insight and motivation are good. Diagnosis:    1. BIJAL (generalized anxiety disorder)          Plan:    Patient Instructions   Spirituality  1)Listen to Sunday service at your [employer]  2) Listen to a Gnosticist podcast on your drive to/from work    Fitness  1) Put an alarm on your phone for 4p everyday and put work out. Aim for a 30 minute work out but if you dont have the time commit to at least doing a quick workout such as 50 pushups. 2) Get a veggie and snack on that during the week. Call about home health for your mom    Bring your list in next time       Pt interventions: Focused intervention on problem focused coping related to barriers to valued living, behavioral activation, restructuring, and created behavioral activation plan. No follow-ups on file. Documentation was done using voice recognition dragon software. Every effort was made to ensure accuracy; however, inadvertent, unintentional computerized transcription errors may be present.

## 2021-01-12 NOTE — PATIENT INSTRUCTIONS
Spirituality  1)Listen to Sunday service at your [employer]  2) Listen to a [employer]aritan podcast on your drive to/from work    Fitness  1) Put an alarm on your phone for 4p everyday and put work out. Aim for a 30 minute work out but if you dont have the time commit to at least doing a quick workout such as 50 pushups. 2) Get a veggie and snack on that during the week.        Call about home health for your mom    Bring your list in next time

## 2021-01-14 ENCOUNTER — VIRTUAL VISIT (OUTPATIENT)
Dept: INTERNAL MEDICINE CLINIC | Age: 46
End: 2021-01-14
Payer: COMMERCIAL

## 2021-01-14 DIAGNOSIS — J45.40 MODERATE PERSISTENT ASTHMA, UNSPECIFIED WHETHER COMPLICATED: Primary | ICD-10-CM

## 2021-01-14 PROCEDURE — 99213 OFFICE O/P EST LOW 20 MIN: CPT | Performed by: INTERNAL MEDICINE

## 2021-01-14 PROCEDURE — G8427 DOCREV CUR MEDS BY ELIG CLIN: HCPCS | Performed by: INTERNAL MEDICINE

## 2021-01-14 NOTE — PROGRESS NOTES
Tomy Perez (:  1975) is a 54 y.o. male,Established patient, here for evaluation of the following chief complaint(s): Letter for School/Work (RTW Letter)      ASSESSMENT/PLAN:  1. Moderate persistent asthma, unspecified whether complicated  Stable  -  Continue current medications    No follow-ups on file. SUBJECTIVE/OBJECTIVE:  HPI   Asthma:  Current treatment includes combination beta agonists/steroid inhalers. Using preventive medication(s) consistently: yes. Residual symptoms: none. Patient denies any other symptoms. He requires his rescue inhaler 1 time(s) per month.       Review of Systems    Patient-Reported Vitals 2020   Patient-Reported Weight 245lbs   Patient-Reported Height -        Physical Exam    Constitutional: [x] Appears well-developed and well-nourished [x] No apparent distress      [] Abnormal -     Mental status: [x] Alert and awake  [x] Oriented to person/place/time [x] Able to follow commands    [] Abnormal -     Eyes:   EOM    [x]  Normal    [] Abnormal -   Sclera  [x]  Normal    [] Abnormal -          Discharge [x]  None visible   [] Abnormal -     HENT: [x] Normocephalic, atraumatic  [] Abnormal -   [x] Mouth/Throat: Mucous membranes are moist    External Ears [x] Normal  [] Abnormal -    Neck: [x] No visualized mass [] Abnormal -     Pulmonary/Chest: [x] Respiratory effort normal   [x] No visualized signs of difficulty breathing or respiratory distress        [] Abnormal -      Musculoskeletal:   [x] Normal gait with no signs of ataxia         [x] Normal range of motion of neck        [] Abnormal -     Neurological:        [x] No Facial Asymmetry (Cranial nerve 7 motor function) (limited exam due to video visit)          [x] No gaze palsy        [] Abnormal -          Skin:        [x] No significant exanthematous lesions or discoloration noted on facial skin         [] Abnormal -            Psychiatric:       [x] Normal Affect [] Abnormal -        [x] No Hallucinations    Other pertinent observable physical exam findings:-                  Shari Guerrero is a 54 y.o. male being evaluated by a Virtual Visit (video visit) encounter to address concerns as mentioned above. A caregiver was present when appropriate. Due to this being a TeleHealth encounter (During MMIRL-56 public health emergency), evaluation of the following organ systems was limited: Vitals/Constitutional/EENT/Resp/CV/GI//MS/Neuro/Skin/Heme-Lymph-Imm. Pursuant to the emergency declaration under the [address] [place] [address], [address] [place] [address] authority and the [employer] Act, this Virtual Visit was conducted with patient's (and/or legal guardian's) consent, to reduce the patient's risk of exposure to COVID-19 and provide necessary medical care. The patient (and/or legal guardian) has also been advised to contact this office for worsening conditions or problems, and seek emergency medical treatment and/or call 911 if deemed necessary. Patient identification was verified at the start of the visit: Yes    Services were provided through a video synchronous discussion virtually to substitute for in-person clinic visit. Patient was located at home and provider was located in office or at home. An electronic signature was used to authenticate this note.     --Corinne Arias MD

## 2021-01-18 ENCOUNTER — TELEPHONE (OUTPATIENT)
Dept: INTERNAL MEDICINE CLINIC | Age: 46
End: 2021-01-18

## 2021-01-18 NOTE — TELEPHONE ENCOUNTER
----- Message from Tiffany Shahid sent at 1/16/2021 11:46 AM EST -----  Subject: Message to Provider    QUESTIONS  Information for Provider? Pt is needing his return to work form emailed to   him at [email or web address]. [place] please  ---------------------------------------------------------------------------  --------------  CALL BACK INFO  What is the best way for the office to contact you? OK to leave message on   [employer]   OK to respond with electronic message via [employer] portal (only for   patients who have registered [employer] account)  Preferred Call Back Phone Number? [phone]  ---------------------------------------------------------------------------  --------------  SCRIPT ANSWERS  Relationship to Patient?  Self

## 2021-01-18 NOTE — TELEPHONE ENCOUNTER
LVM to let pt know that I re-sent his form to his e-mail. This is the second attempt. Pt may need to come and pick it up if he doesn't get it.

## 2021-01-19 ENCOUNTER — TELEPHONE (OUTPATIENT)
Dept: INTERNAL MEDICINE CLINIC | Age: 46
End: 2021-01-19

## 2021-01-19 NOTE — TELEPHONE ENCOUNTER
The email provided is the email I sent the form to twice. I have good confirmations on both. Pt came in and picked up the original form.

## 2021-01-28 ENCOUNTER — OFFICE VISIT (OUTPATIENT)
Dept: PULMONOLOGY | Age: 46
End: 2021-01-28
Payer: COMMERCIAL

## 2021-01-28 DIAGNOSIS — J45.40 ASTHMA, MODERATE PERSISTENT, POORLY-CONTROLLED: Primary | ICD-10-CM

## 2021-01-28 DIAGNOSIS — F41.1 GAD (GENERALIZED ANXIETY DISORDER): ICD-10-CM

## 2021-01-28 DIAGNOSIS — Z86.711 HISTORY OF PULMONARY EMBOLISM: ICD-10-CM

## 2021-01-28 PROCEDURE — G8427 DOCREV CUR MEDS BY ELIG CLIN: HCPCS | Performed by: INTERNAL MEDICINE

## 2021-01-28 PROCEDURE — 99213 OFFICE O/P EST LOW 20 MIN: CPT | Performed by: INTERNAL MEDICINE

## 2021-01-28 PROCEDURE — G8417 CALC BMI ABV UP PARAM F/U: HCPCS | Performed by: INTERNAL MEDICINE

## 2021-01-28 PROCEDURE — G8484 FLU IMMUNIZE NO ADMIN: HCPCS | Performed by: INTERNAL MEDICINE

## 2021-01-28 PROCEDURE — 1036F TOBACCO NON-USER: CPT | Performed by: INTERNAL MEDICINE

## 2021-01-28 RX ORDER — PREDNISONE 20 MG/1
40 TABLET ORAL DAILY
Qty: 14 TABLET | Refills: 0 | Status: SHIPPED | OUTPATIENT
Start: 2021-01-28 | End: 2021-03-01 | Stop reason: ALTCHOICE

## 2021-01-28 NOTE — PROGRESS NOTES
[facility] Pulmonary [facility] Critical Care    Outpatient follow-up note    Subjective:   CHIEF COMPLAINT / HPI:     The patient is 54 y.o. male who is here for follow-up of presumed asthma. Last visit I wrote for prednisone 40 mg daily x7 days, Symbicort, and albuterol. However there is a miscommunication and the directions and he did not take the prednisone and it sounds like he is using the Symbicort on a rescue basis. Wheezing and cough seem to be a little bit better but he still has some chest tightness and is unclear if his dyspnea on exertion is any better    1/6/2021  Tomy presents today for a new patient visit for evaluation of dyspnea, chest tightness, chest congestion. Patient has a history of recurrent PE most recently in 2018 currently on Eliquis. He also has a history of pleurisy and was diagnosed with asthma back in 2019 and was started on Breo. He states that made him cough and he no longer uses it. His dyspnea is worse with exertion but he can have it at rest.  Over the last year it is worsened in severity and frequency. It is accompanied by some chest tightness and chest congestion. He has an occasional cough but mainly dry. He cannot give a description of the mucus. He has some postnasal drip. Past Medical History:    Past Medical History:   Diagnosis Date    Collar bone fracture     BIJAL (generalized anxiety disorder)     History of appendectomy     Migraine     Moderate episode of recurrent major depressive disorder ([address] 5/1/2018    Pulmonary emboli ([facility] [address]     2017, 2018       Social History:    Works at [employer] as   Smoked 1 PPD x 30 yrs - quit 5 yrs ago    Family History:  PE  DM II    Current Medications:  Current Outpatient Medications on File Prior to Visit   Medication Sig Dispense Refill    lisdexamfetamine (VYVANSE) 50 MG capsule Take 1 capsule by mouth every morning for 30 days.  30 capsule 0    [START ON 2/7/2021] lisdexamfetamine (VYVANSE) 50 MG capsule Take 1 capsule by mouth every morning for 30 days. 30 capsule 0    [START ON 3/7/2021] lisdexamfetamine (VYVANSE) 50 MG capsule Take 1 capsule by mouth every morning for 30 days. 30 capsule 0    budesonide-formoterol (SYMBICORT) 160-4.5 MCG/ACT AERO Inhale 2 puffs into the lungs 2 times daily 1 Inhaler 6    albuterol sulfate HFA (PROVENTIL HFA) 108 (90 Base) MCG/ACT inhaler Inhale 2 puffs into the lungs every 4 hours as needed for Wheezing or Shortness of Breath 1 Inhaler 11    valACYclovir (VALTREX) 500 MG tablet Take 1 tablet by mouth daily 30 tablet 5    apixaban (ELIQUIS) 5 MG TABS tablet TAKE 1 TABLET BY MOUTH TWICE DAILY 60 tablet 11    citalopram (CELEXA) 20 MG tablet TAKE 1 TABLET BY MOUTH DAILY 90 tablet 3     No current facility-administered medications on file prior to visit. REVIEW OF SYSTEMS:    CONSTITUTIONAL: Negative for fevers and chills  HEENT: Negative for oropharyngeal exudate, post nasal drip, sinus pain / pressure, nasal congestion, ear pain  RESPIRATORY:  See HPI  CARDIOVASCULAR: Negative for chest pain, palpitations, edema  GASTROINTESTINAL: Negative for nausea, vomiting, diarrhea, constipation and abdominal pain  GENITOURINARY: Negative for dysuria, urinary frequency, urinary hesitancy  HEMATOLOGICAL: Negative for adenopathy  SKIN: Negative for clubbing, cyanosis, skin lesions  ENDOCRINE: Negative for polyuria, polydipsia, heat intolerance, cold intolerance   EXTREMITIES: Negative for weakness or decreased ROM in all extremities  NEUROLOGICAL: Negative for unilateral weakness, speech or gait abnormalities    Objective:   PHYSICAL EXAM:        VITALS:  There were no vitals taken for this visit.     CONSTITUTIONAL:  Awake, alert, cooperative, no apparent distress, and appears stated age  [de-identified]: No oropharyngeal exudate, PERRL, no cervical adenopathy, no tracheal deviation, thyroid size normal  LUNGS:  No increased work of breathing and clear to auscultation, no crackles or wheezing  CARDIOVASCULAR:  normal S1 and S2 and no JVD  ABDOMEN:  Normal bowel sounds, non-distended and non-tender to palpation  EXT: No edema, no calf tenderness. Pulses are present bilaterally. NEUROLOGIC:  Mental Status Exam:  Level of Alertness:   awake  Orientation:   person, place, time. SKIN:  normal skin color, texture, turgor, no redness, warmth, or swelling     DATA:      Radiology Review:  Pertinent images / reports were reviewed as a part of this visit. CTPA 11/3/2020 reveals the following:  FINDINGS:  PULMONARY ARTERIES:  Enhance normally without filling defect or other evidence of pulmonary embolism  LUNGS/AIRWAYS:  Unremarkable. No air space disease or mass  PLEURA: Unremarkable. No pleural effusion or pneumothorax  MEDIASTINUM/HONORIO:  Unremarkable  HEART/PERICARDIUM:  Unremarkable  VESSELS:  Unremarkable. No aneurysm  CHEST WALL/LOWER NECK:  Unremarkable  UPPER ABDOMEN:  Unremarkable  BONES:  Unremarkable  OTHER:  None      IMPRESSION      Normal chest CTA. No evidence of pulmonary embolus. Status     Last PFTs:  DATE OF PROCEDURE:  01/22/2019     Forced vital capacity and FEV1 and FEV1 to FVC ratio normal.  [id number]  moderately reduced. After inhaled bronchodilator, mid to late  expiratory flow was improved. Total lung capacity and FRC normal, RV  increased. Single-breath diffusion capacity is normal.     IMPRESSION:  Reduction of mid to late expiratory flow rates consistent  with moderate small airways dysfunction, improved after inhaled  bronchodilator. Finding is consistent with mild asthma, history of  smoking, post travel bronchitis. There is mild air trapping associated  with this. Remainder of pulmonary function study is normal.     Assessment:      Diagnosis Orders   1. Asthma, moderate persistent, poorly-controlled     2. History of pulmonary embolism     3. BIJAL (generalized anxiety disorder)         Plan:   1. Patient will schedule Symbicort 160 mcg MDI 2 p BID  2.   Continue albuterol MDI 2 p q 4 hrs prn dyspnea  3. Represcribed prednisone 40 mg daily x 7 days  4. Agree with PSG  5.  RTC 3 months and he will give a phone update to us in 4 weeks

## 2021-02-02 ENCOUNTER — OFFICE VISIT (OUTPATIENT)
Dept: PSYCHOLOGY | Age: 46
End: 2021-02-02
Payer: COMMERCIAL

## 2021-02-02 DIAGNOSIS — F90.0 ATTENTION DEFICIT HYPERACTIVITY DISORDER (ADHD), PREDOMINANTLY INATTENTIVE TYPE: Primary | ICD-10-CM

## 2021-02-02 DIAGNOSIS — F41.1 GAD (GENERALIZED ANXIETY DISORDER): Chronic | ICD-10-CM

## 2021-02-02 DIAGNOSIS — F33.1 MODERATE EPISODE OF RECURRENT MAJOR DEPRESSIVE DISORDER (HCC): Primary | Chronic | ICD-10-CM

## 2021-02-02 PROCEDURE — 1036F TOBACCO NON-USER: CPT | Performed by: PSYCHOLOGIST

## 2021-02-02 PROCEDURE — 90832 PSYTX W PT 30 MINUTES: CPT | Performed by: PSYCHOLOGIST

## 2021-02-02 RX ORDER — DEXTROAMPHETAMINE SACCHARATE, AMPHETAMINE ASPARTATE MONOHYDRATE, DEXTROAMPHETAMINE SULFATE AND AMPHETAMINE SULFATE 3.75; 3.75; 3.75; 3.75 MG/1; MG/1; MG/1; MG/1
15 CAPSULE, EXTENDED RELEASE ORAL DAILY
Qty: 30 CAPSULE | Refills: 0 | Status: SHIPPED | OUTPATIENT
Start: 2021-02-02 | End: 2021-05-18 | Stop reason: SDUPTHER

## 2021-02-02 NOTE — PROGRESS NOTES
Behavioral Health Consultation  Beuford Baumgarten, Ph.D.  Psychologist  2/2/2021   4:26 PM EST       Time spent with Patient: 30 minutes      Reason for Consult:    Chief Complaint   Patient presents with    Other       Pt provided informed consent for the behavioral health program. Discussed with patient model of service to include the limits of confidentiality (i.e. abuse reporting, suicide  intervention, etc.) and short-term intervention focused approach. Pt indicated understanding. Feedback given to PCP. S:  Pt seen per PCP re: anxiety    Pt seen for f/u. Patient noted that things have been slightly improved. Patient stated that his mom will be starting an assisted living facility and that he has been doing well studying for work. Patient noted that at times he struggles to communicate because he is afraid of coming across as irritable to others. Patient noted that he does tend to go to irritability rather than other emotions. Focused intervention on discussion of anger as a secondary emotion, communicating primary emotion, utilizing primary emotional experience to address unmet needs.   O:  MSE:    Appearance: good hygiene   Attitude: cooperative and friendly  Consciousness: alert  Orientation: oriented to person, place, time, general circumstance  Memory: recent and remote memory intact  Attention/Concentration: lost train of thought while speaking  Psychomotor Activity:normal  Eye Contact: normal  Speech: normal rate and volume, well-articulated  Mood: anxious  Affect: congruent  Perception: within normal limits  Thought Content: within normal limits  Thought Process: logical, coherent  Insight: good  Judgment: intact  Ability to understand instructions: Yes  Morbid Ideation: no   Suicide Assessment: no suicidal ideation, plan, or intent  Homicidal Ideation: no     History:    Social History:   Social History     Socioeconomic History    Marital status: Single     Spouse name: Not on file    Number of children: 2    Years of education: Not on file    Highest education level: Not on file   Occupational History    Occupation: [employer]    Social Needs    Financial resource strain: Not on file    Food insecurity     Worry: Not on file     Inability: Not on file   [employer] needs     Medical: Not on file     Non-medical: Not on file   Tobacco Use    Smoking status: Former Smoker     Packs/day: 0.50     Years: 25.00     Pack years: 12.50     Types: Cigarettes     Quit date: 2015     Years since quittin.4    Smokeless tobacco: Never Used    Tobacco comment: started to smoke at 12 / smoked up to 1 to 1.5 p.p.d / quit smoking 2015   Substance and Sexual Activity    Alcohol use: Yes     Alcohol/week: 0.0 standard drinks     Comment: socially    Drug use: No    Sexual activity: Yes     Partners: Female     Comment: 1 child   Lifestyle    Physical activity     Days per week: Not on file     Minutes per session: Not on file    Stress: Not on file   Relationships    Social connections     Talks on phone: Not on file     Gets together: Not on file     Attends Alevism service: Not on file     Active member of club or organization: Not on file     Attends meetings of clubs or organizations: Not on file     Relationship status: Not on file    Intimate partner violence     Fear of current or ex partner: Not on file     Emotionally abused: Not on file     Physically abused: Not on file     Forced sexual activity: Not on file   Other Topics Concern    Not on file   Social History Narrative    Lives with mother-December 10, 2020      TOBACCO:   reports that he quit smoking about 5 years ago. His smoking use included cigarettes. He has a 12.50 pack-year smoking history. He has never used smokeless tobacco.  ETOH:   reports current alcohol use. A:  Patient engaged and cooperative. denies SI. Insight and motivation are good. Diagnosis:    1.  Moderate episode of recurrent major depressive disorder ([facility]    2. BIJAL (generalized anxiety disorder)          Plan:    Patient Instructions             Pt interventions:      No follow-ups on file. Documentation was done using voice recognition dragon software. Every effort was made to ensure accuracy; however, inadvertent, unintentional computerized transcription errors may be present.

## 2021-02-08 ENCOUNTER — HOSPITAL ENCOUNTER (OUTPATIENT)
Dept: SLEEP CENTER | Age: 46
Discharge: HOME | End: 2021-02-08
Payer: COMMERCIAL

## 2021-02-08 DIAGNOSIS — G47.10 HYPERSOMNIA: ICD-10-CM

## 2021-02-08 DIAGNOSIS — G25.81 RLS (RESTLESS LEGS SYNDROME): ICD-10-CM

## 2021-02-08 PROCEDURE — 95806 SLEEP STUDY UNATT&RESP EFFT: CPT

## 2021-02-08 PROCEDURE — 95806 SLEEP STUDY UNATT&RESP EFFT: CPT | Performed by: INTERNAL MEDICINE

## 2021-02-15 ENCOUNTER — TELEPHONE (OUTPATIENT)
Dept: PULMONOLOGY | Age: 46
End: 2021-02-15

## 2021-02-15 NOTE — TELEPHONE ENCOUNTER
Pt returning call with spouse on speaker phone. Review results again with pt and spouse asking appropriate questions. Order to be sent to [address] [place] [address] due to location. F/U scheduled.

## 2021-02-15 NOTE — PROGRESS NOTES
Tomy ELISE Chris         : 1975 MSC    Diagnosis: [x] VU (G47.33) [] CSA (G47.31) [] Apnea (G47.30)   Length of Need: [x] 12 Months [] 99 Months [] Other:    Machine (GINA!): [x] Respironics Dream Station      Auto [] ResMed AirSense     Auto [] Other:     [x]  CPAP () [] Bilevel ()   Mode: [x] Auto [] Spontaneous    Mode: [] Auto [] Spontaneous          P min 6 cmH2O  Pmax 16 cmH2O                 Comfort Settings:   - Ramp Pressure: 4 cmH2O                                        - Ramp time: 15 min                                     -  Flex/EPR - 3 full time                                    - For ResMed Bilevel (TiMax-4 sec   TiMin- 0.2 sec)     Humidifier: [x] Heated ()        [x] Water chamber replacement ()/ 1 per 6 months        Mask:   [x] Nasal () /1 per 3 months [] Full Face () /1 per 3 months   [x] Patient choice -Size and fit mask [] Patient Choice - Size and fit mask   [] Dispense:  [] Dispense:    [x] Headgear () / 1 per 3 months [] Headgear () / 1 per 3 months   [x] Replacement Nasal Cushion ()/2 per month [] Interface Replacement ()/1 per month   [] Replacement Nasal Pillows ()/2 per month         Tubing: [x] Heated ()/1 per 3 months    [] Standard ()/1 per 3 months [] Other:           Filters: [x] Non-disposable ()/1 per 6 months     [x] Ultra-Fine, Disposable ()/2 per month        Miscellaneous: [] Chin Strap ()/ 1 per 6 months [] O2 bleed-in:       LPM   [] Oximetry on CPAP/Bilevel []  Other:    [x] Modem: ()         Start Order Date: 02/15/21    MEDICAL JUSTIFICATION:  I, the undersigned, certify that the above prescribed supplies are medically necessary for this patients wellbeing. In my opinion, the supplies are both reasonable and necessary in reference to accepted standards of medicalpractice in treatment of this patients condition.     Cecy Couch MD      NPI: [id number]       Order Signed Date: 02/15/21    Electronically signed by Claudean Peon, MD on 2/15/2021 at 10:32 AM    Tomy Perez  1975  [address] [id number] [facility]  [phone] (home)   [phone] (mobile)      Insurance Info (confirm with patient if correct):  Payor/Plan Subscr  Sex Relation Sub.  Ins. ID Effective Group Num

## 2021-02-15 NOTE — TELEPHONE ENCOUNTER
Spoke with pt to review HST results. Pt does not think sleep apnea is the issue. Pt states his issue is not being able to fall asleep. Effects of untreated apnea were reviewed including hypertention, depression, stroke , heart attack, type II diabetes, and death. Pt asked to call back later he is arriving at work.

## 2021-02-18 ENCOUNTER — OFFICE VISIT (OUTPATIENT)
Dept: INTERNAL MEDICINE CLINIC | Age: 46
End: 2021-02-18
Payer: COMMERCIAL

## 2021-02-18 VITALS
WEIGHT: 256 LBS | DIASTOLIC BLOOD PRESSURE: 75 MMHG | TEMPERATURE: 97.3 F | RESPIRATION RATE: 16 BRPM | OXYGEN SATURATION: 97 % | HEART RATE: 67 BPM | BODY MASS INDEX: 29.58 KG/M2 | SYSTOLIC BLOOD PRESSURE: 124 MMHG

## 2021-02-18 DIAGNOSIS — Z12.11 COLON CANCER SCREENING: Primary | ICD-10-CM

## 2021-02-18 PROCEDURE — 99213 OFFICE O/P EST LOW 20 MIN: CPT | Performed by: INTERNAL MEDICINE

## 2021-02-18 RX ORDER — POLYETHYLENE GLYCOL 3350 17 G/17G
238 POWDER ORAL DAILY
Qty: 255 G | Refills: 0 | Status: ON HOLD | OUTPATIENT
Start: 2021-02-18 | End: 2021-03-08 | Stop reason: HOSPADM

## 2021-02-18 NOTE — PATIENT INSTRUCTIONS
Dr. Montiel Exon office will call to schedule your colonoscopy and send prep to your Pharmacy  You will need covid test before the colonoscopy is to be done. PROBLEM LIST:  Adenocarcinoma of the lung clinical stage TIII N1 M0    CHIEF COMPLAINT:  Discussion of treatment options    HISTORY OF PRESENT ILLNESS:  Very healthy 81-year-old gentleman initially referred to her office courtesy of Dr. Dk Busby, for evaluation and workup was newly diagnosed lung cancer. Apparently patient  Had 3 weeks of  upper respiratory infection symptoms and chest x-ray was done and documenting on March 2 of 2016a 2 cm mass in the right lower lobe. Further imaging with CT scan done the next day documented a large peripheral mass in the right lower lobe neoplasm suspected measuring 2 x 3.1 cm. Secondary small nodule in the right lower lobe possible satellite lesion. 2 small nodes in the left major fissure, a pericardial effusion and no adenopathy. In light of these findings a biopsy of the mass was performed by interventional radiology. Pathology has reported a well-differentiated mucinous adenocarcinoma compatible with lung primary. In light of these findings patient had a PET scan performed. Patient shows a modest-sized hypermetabolic focus in the right lower lobe consistent with patient's known adenocarcinoma 2 mildly hypermetabolic little nodes seen in the right hilar region and a 4.6 mm satellite lesion in the right lower lobe. No evidence of activity in the nodule seen in the major fissure. Patient at this time has no other issues or problems does have a smoking history but quite remote quit more than 50 years ago has a small amount of smoking 14-16-pack-year history. No history of exposure to asbestos no real family history. Clinically patient staged as T3 N1 M0 tumor for stage IIIa.  Patient seems to have normal PFTs. Presented at tumor board and patient advised to consider surgery which was performed. If MRI brain was negative. MR of the brain performed no metastatic disease so referred to CT surgery. Patient underwent surgical exploration with lobectomy and lymph node dissection.  Patient identified as being T3 N2 M0. . Did have EGFR testing as well as ALK1 and ROS. Patient found to have an EGFR mutation. This was confirmed by participation in the alchemist lung trial. But when advised to consider therapy with tarceva, patient refused and started on surveillance.    Patient eventually developed significant pain in hip diagnosis of metastatic disease made, treated  by orthopedics under the guidance of Dr. Gutierrez underwent in December 2016 Ortho fixation done for impending right interthrocanteric  fx.  Started on xrt to both sites of  metastsis completed radiation 1/23/2017.  Patient has continued to tolerate well. Tolerating 100 mg daily. Did have significant hip pain post procedure workup did not document metastases seen by Ortho patient with scarring secondary to recent procedure treated with steroids improving at this time. Does have a rash related to the use of Tarceva well controlled    Ct scan done prior to office visit on June 22, 2017 documented    1.  Stable CT scan of the chest abdomen pelvis.  No new or recurrent  disease appreciated.  2.  Stable to decreased size of destructive process involving the manubrium  and sternum.  3.  Mild decreased size of right-sided pleural fluid collection probably  with reexpanding right lung status post lobectomy.  4.  Stable pericardial effusion.  5.  Stable CT scan of the abdomen and pelvis.  No evidence of new or  recurrent disease.  6.  No change in small amount of free cul-de-sac fluid.  7.  Stable nonspecific infiltration of greater omentum fat.    Hip pain improved no pain med use over last couple of weeks pain is minimal 1-2/10. No side effects from pills, to continue tarceva. Taking daily, last month they delivered the meds 4 days late so he missed those days.  Now ok.    PAST MEDICAL HISTORY:  Reviewed, no significant change since last exam.    PAST SURGICAL HISTORY:    Reviewed, no significant change since last exam.     SOCIAL  HISTORY:  Reviewed, no significant change since last exam.    FAMILY HISTORY:  Reviewed, no significant change since last exam.    ALLERGIES:  Reviewed, no significant change since last exam.    MEDICATIONS:  Reviewed, no significant change since last exam.     REVIEW OF SYSTEMS:  Reviewed from nurse’s notes and concur.     Examination of the patient reveals:     Oncology Inpatient Vitals [07/24/17 1034]   ONC IP  Vitals Group      /60      Pulse 86      Resp 18      Temp 97.4 °F (36.3 °C)      Temp src Oral      SpO2 99 %      Weight 162 lb 6.4 oz (73.7 kg)      Height 6' (1.829 m)      Comfort/Function (Pain) SCORE at Rest       BSA (Calculated - sq m) 1.93      BMI (Calculated) 22.07       ECoG PS 1 - No physically strenuous activity, but ambulatory and able to carry out light or sedentary work.      GENERAL: Alert, is in no apparent distress and is well developed and well nourished.  LYMPH NODES: No cervical adenopathy, no supraclavicular adenopathy, no axillary adenopathy and no inguinal adenopathy.  SKIN: Normal color, normal texture, normal turgor, Tarceva acneitis  HEAD: Normocephalic.  EYES: Pupils are equal and reactive to light and accommodation, sclerae and conjunctivae are normal, lids and lashes are normal.  MOUTH/THROAT: Tongue is midline and appears normal, oropharynx appears normal, soft palate and uvula are normal and oral mucosa is normal.  NECK: Neck is supple, no thyromegaly, no anterior cervical adenopathy, no posterior cervical adenopathy and no supraclavicular adenopathy.  CHEST: Contour is normal with normal AP diameter, normal respiratory excursion and respiratory effort is not labored. Scars from recent thoracotomy on right side healed. Pain over right subscapular area  LUNGS: Lungs are clear to auscultation with normal inspiratory/expiratory sounds, no rales, no rhonchi and no wheezes.  HEART: Normal PMI, normal rate and rhythm, no palpable heaves or thrills, S1 and S2 normal, no S3  or S4, no murmurs and no extra heart sounds.  ABDOMEN: Abdomen is soft, normal active bowel sounds, nontender, without masses, without hepatomegaly, without splenomegaly and no pulsatile masses.  NEUROLOGIC: Cranial nerves 2 through 12 normal, motor strength normal, gait and station normal, coordination normal, DTR's (deep tendon reflexes) normal and symmetric and no tremor noted.  EXTREMITIES: pain with mass in right pelvic area seems to have increased mass on the anterior right pelvic wing much less tender after steroid injection.      LABORATORY DATA:  WBC (K/mcL)   Date Value   07/24/2017 7.2     HGB (g/dL)   Date Value   07/24/2017 15.3     PLT (K/mcL)   Date Value   07/24/2017 193   07/26/2013 132 (L)     Creatinine (mg/dL)   Date Value   06/26/2017 1.00     LDH (Units/L)   Date Value   06/26/2017 196     AST/SGOT (Units/L)   Date Value   06/26/2017 18     ALT/SGPT (Units/L)   Date Value   06/26/2017 21     TOTAL BILIRUBIN (mg/dL)   Date Value   06/26/2017 0.9     ALK PHOSPHATASE (Units/L)   Date Value   06/26/2017 111     cmp pending will review      ASSESSMENT AND RECOMMENDATIONS     Patient seems to be responding to Tarceva no plans for change in therapy at this time. Patient pain is improved. No changes in therapy planned. We will continue to follow patient on the every other month basis repeat CT scan 4 months from now. Has done quite well with Tarceva at this time.    Once again, thank you very much for the opportunity to participate in the care of this patient.  If you have any questions or concerns, please feel free to contact us at any time..      Tam Deutsch MD  Cc  Dr Busby

## 2021-02-18 NOTE — PROGRESS NOTES
[address] [address]  Phone: [phone]   [facility]     Chief Complaint   Patient presents with    Other     screen colonoscopy       HPI     Thank you Marilynn Peralta MD for asking me to see Tomy Perez in consultation. Ally Marx is a 54 y.o. male Single [1] Black [2] with medical history of moderate persistent asthma, and recurrent pulmonary emboli in 2018 on Eliquis seen independently with referral for colon cancer screening. Denies abdominal pain, nausea, vomiting, fever, chills, unintentional weight loss, constipation, diarrhea, hematochezia or melenic stools. No family history of colon cancer. Last Encounter Reviewed: None  Pertinent PMH, FH, SH is reviewed below. Last EGD: None  Last Colonoscopy: None     Review of available records reveals:   Wt Readings from Last 50 Encounters:   02/18/21 256 lb (116.1 kg)   01/07/21 249 lb (112.9 kg)   01/06/21 250 lb (113.4 kg)   12/10/20 246 lb (111.6 kg)   03/04/20 248 lb 14.4 oz (112.9 kg)   02/26/20 249 lb (112.9 kg)   02/19/20 246 lb 0.5 oz (111.6 kg)   02/13/20 246 lb (111.6 kg)   01/13/20 241 lb (109.3 kg)   08/28/19 247 lb (112 kg)   04/03/19 250 lb (113.4 kg)   02/25/19 248 lb (112.5 kg)   02/04/19 247 lb (112 kg)   01/14/19 244 lb (110.7 kg)   07/10/18 229 lb (103.9 kg)   06/14/18 227 lb (103 kg)   06/13/18 228 lb (103.4 kg)   06/08/18 225 lb (102.1 kg)   05/14/18 235 lb (106.6 kg)   04/16/18 234 lb (106.1 kg)   12/08/17 227 lb (103 kg)   12/02/17 227 lb (103 kg)   10/05/17 227 lb (103 kg)   07/31/17 226 lb 9.6 oz (102.8 kg)   07/13/17 225 lb (102.1 kg)   06/29/17 227 lb 8.2 oz (103.2 kg)   04/24/17 235 lb (106.6 kg)   07/19/16 227 lb 9.6 oz (103.2 kg)   07/09/16 235 lb (106.6 kg)   05/14/15 230 lb (104.3 kg)   07/22/14 212 lb (96.2 kg)   06/27/13 195 lb (88.5 kg)   02/25/13 213 lb (96.6 kg)   07/02/12 216 lb (98 kg)   06/21/12 211 lb (95.7 kg)   03/16/12 220 lb (99.8 kg)   04/21/11 211 lb (95.7 kg)   03/30/11 208 lb (94.3 kg)   03/17/11 209 lb (94.8 kg)   01/03/11 208 lb (94.3 kg)   11/12/10 207 lb (93.9 kg)   10/29/10 206 lb (93.4 kg)   09/29/10 210 lb (95.3 kg)       No components found for: HGBA1C  BP Readings from Last 3 Encounters:   02/18/21 124/75   01/07/21 128/70   12/10/20 118/82     Health Maintenance   Topic Date Due    Hepatitis C screen  1975    Pneumococcal 0-64 years Vaccine (1 of 1 - PPSV23) 06/25/1980    Diabetes screen  06/25/2014    Flu vaccine (1) 12/10/2021 (Originally 9/1/2020)    Lipid screen  01/05/2026    DTaP/Tdap/Td vaccine (3 - Td) 12/10/2030    HIV screen  Completed    Hepatitis A vaccine  Aged Out    Hepatitis B vaccine  Aged Out    Hib vaccine  Aged Out    Meningococcal (ACWY) vaccine  Aged Out       No components found for: [facility]     PAST MEDICAL HISTORY     Past Medical History:   Diagnosis Date    Collar bone fracture     BIJAL (generalized anxiety disorder)     History of appendectomy     Migraine     Moderate episode of recurrent major depressive disorder ([facility] [address] 5/1/2018    Pulmonary emboli ([facility] [address]     2017, 2018     FAMILY HISTORY     Family History   Problem Relation Age of Onset    Diabetes Mother     Diabetes Sister     Clotting Disorder Maternal Grandfather     Diabetes Sister     Clotting Disorder Father      SOCIAL HISTORY     Social History     Socioeconomic History    Marital status: Single     Spouse name: Not on file    Number of children: 2    Years of education: Not on file    Highest education level: Not on file   Occupational History    Occupation: [employer]    Social Needs    Financial resource strain: Not on file    Food insecurity     Worry: Not on file     Inability: Not on file   [employer] needs     Medical: Not on file     Non-medical: Not on file   Tobacco Use    Smoking status: Former Smoker     Packs/day: 0.50     Years: 25.00     Pack years: 12.50 Types: Cigarettes     Quit date: 2015     Years since quittin.4    Smokeless tobacco: Never Used    Tobacco comment: started to smoke at 12 / smoked up to 1 to 1.5 p.p.d / quit smoking 2015   Substance and Sexual Activity    Alcohol use: Yes     Alcohol/week: 0.0 standard drinks     Comment: socially    Drug use: No    Sexual activity: Yes     Partners: Female     Comment: 1 child   Lifestyle    Physical activity     Days per week: Not on file     Minutes per session: Not on file    Stress: Not on file   Relationships    Social connections     Talks on phone: Not on file     Gets together: Not on file     Attends Jainism service: Not on file     Active member of club or organization: Not on file     Attends meetings of clubs or organizations: Not on file     Relationship status: Not on file    Intimate partner violence     Fear of current or ex partner: Not on file     Emotionally abused: Not on file     Physically abused: Not on file     Forced sexual activity: Not on file   Other Topics Concern    Not on file   Social History Narrative    Lives with mother-December 10, 2020      SURGICAL HISTORY     Past Surgical History:   Procedure Laterality Date    APPENDECTOMY  2019   [place]  2009     CURRENT MEDICATIONS   (This list may include medications prescribed during this encounter as epic can not insert only the list prior to this encounter.)  Current Outpatient Rx   Medication Sig Dispense Refill    bisacodyl (DULCOLAX) 5 MG EC tablet Take 4 tablets by mouth once for 1 dose Take as directed for colonoscopy. 4 tablet 0    polyethylene glycol (MIRALAX) 17 GM/SCOOP POWD powder Take 238 g by mouth daily Take as directed for colonoscopy 255 g 0    apixaban (ELIQUIS) 5 MG TABS tablet TAKE 1 TABLET BY MOUTH TWICE A DAY 60 tablet 8    amphetamine-dextroamphetamine (ADDERALL XR) 15 MG extended release capsule Take 1 capsule by mouth daily for 30 days.  30 capsule 0    budesonide-formoterol (SYMBICORT) 160-4.5 MCG/ACT AERO Inhale 2 puffs into the lungs 2 times daily 1 Inhaler 6    albuterol sulfate HFA (PROVENTIL HFA) 108 (90 Base) MCG/ACT inhaler Inhale 2 puffs into the lungs every 4 hours as needed for Wheezing or Shortness of Breath 1 Inhaler 11    valACYclovir (VALTREX) 500 MG tablet Take 1 tablet by mouth daily 30 tablet 5    citalopram (CELEXA) 20 MG tablet TAKE 1 TABLET BY MOUTH DAILY 90 tablet 3    predniSONE (DELTASONE) 20 MG tablet Take 2 tablets by mouth daily (Patient not taking: Reported on 2/18/2021) 14 tablet 0     ALLERGIES   No Known Allergies  IMMUNIZATIONS     Immunization History   Administered Date(s) Administered    Influenza 10/29/2010    Tdap (Boostrix, Adacel) 10/29/2010, 12/10/2020     REVIEW OF SYSTEMS   See HPI for further details and pertinent postiives. Negative for the following:  Constitutional: Negative for weight change. Negative for appetite change and fatigue. HENT: Negative for nosebleeds, sore throat, mouth sores, and voice change. Respiratory: Negative for cough, choking and chest tightness. Cardiovascular: Negative for chest pain   Gastrointestinal: See HPI  Musculoskeletal: Negative for arthralgias. Skin: Negative for pallor. Neurological: Negative for weakness and light-headedness. Hematological: Negative for adenopathy. Does not bruise/bleed easily. Psychiatric/Behavioral: Negative for suicidal ideas. PHYSICAL EXAM   VITAL SIGNS: /75 (Site: Right Upper Arm, Position: Sitting, Cuff Size: Medium Adult)   Pulse 67   Temp 97.3 °F (36.3 °C) (Temporal)   Resp 16   Wt 256 lb (116.1 kg)   SpO2 97%   BMI 29.58 kg/m²   Wt Readings from Last 3 Encounters:   02/18/21 256 lb (116.1 kg)   01/07/21 249 lb (112.9 kg)   01/06/21 250 lb (113.4 kg)     Constitutional: Well developed, Well nourished, No acute distress, Non-toxic appearance.    HENT: Normocephalic, Atraumatic, Bilateral external ears normal, Oropharynx General guidelines for holding blood thinners/anticoagulants around endoscopic procedure are but patients are encouraged to check with their prescribing physician. The patient may hold Plavix, Effient, Brilinta 5 days prior to the procedure unless:       A drug eluting stent has been placed within past 12 months. A nondrug eluting stent has been placed within past 1 month. Coumadin may be held 4 days prior to the procedure unless:        Mechanical mitral valve replacement (requires heparin bridge while Coumadin held and is managed by pharmacy)      Pradaxa, Xarelto, Eliquis may be held 2-3 days prior to procedure. According to pharmacokinetics of the drug, package insert, cardiology practice patterns, and T1/2 of theses drugs (12 hrs), Eliquis and Xarelto are held 48hrs prior to any procedure, including major surgical procedures w/o       increased bleeding.  That is usually the standard of care, as coagulation would/should be normalized at 48hrs. Every attempt should be made to maintain ASA 81mg per day throughout the srini-operative period in patients with diagnosis of ASHD. These recommendations may need to be modified by the provider/ based on risk /benefit analysis of the procedure and the patients history. If anticoagulation can not be held because recent cardiac stent, elective endoscopic procedures should be delayed until they have received the minimum duration of recommended antiplatlet therapy and it can safely be held. Again if unsure, patient should discuss with prescribing physician/service. If anticoagulation can not be stopped, endoscopic procedures can still be performed either diagnostically at a somewhat higher risk. Understand that any therapeutic procedure where anything beyond looking is performed, carries higher risks.   For this reason without overt bleeding other testing       such as cologuard may be more appropriate. High risk endoscopic procedures that require stopping antiplatelet and anticoagulation therapy include polypectomy, biliary or pancreatic sphincterotomy, pneumatic or bougie dilation, PEG placement, therapeutic balloon-assisted enteroscopy, EUS and FNA, tumor ablation by any technique,       cystogastrostomy,and treatment of varices.  Covid-19 Ambulatory     Standing Status:   Future     Standing Expiration Date:   2/18/2022     Scheduling Instructions:      Saline media preferred given current shortage of viral transport media but both acceptable     Order Specific Question:   Status     Answer:   Asymptomatic/Surveillance (e.g. pre-op/pre-procedure, pre-delivery, transfer)     Order Specific Question:   Reason for Test     Answer:   Upcoming elective surgery/procedure/delivery, return to work, or discharge to another facility       ORDERED [facility]     Lab Frequency Next Occurrence   MRI LUMBAR SPINE [address] Once 02/19/2020       FOLLOWUP   No follow-ups on file.           Bruno Serum 2/17/21 8:50 PM EST    CC:  Nick Kim MD

## 2021-02-19 DIAGNOSIS — F32.1 MODERATE SINGLE CURRENT EPISODE OF MAJOR DEPRESSIVE DISORDER (HCC): ICD-10-CM

## 2021-02-22 RX ORDER — CITALOPRAM 20 MG/1
20 TABLET ORAL DAILY
Qty: 90 TABLET | Refills: 2 | Status: SHIPPED | OUTPATIENT
Start: 2021-02-22 | End: 2021-11-04 | Stop reason: SDUPTHER

## 2021-03-01 ENCOUNTER — TELEPHONE (OUTPATIENT)
Dept: INTERNAL MEDICINE CLINIC | Age: 46
End: 2021-03-01

## 2021-03-05 ENCOUNTER — OFFICE VISIT (OUTPATIENT)
Dept: PRIMARY CARE CLINIC | Age: 46
End: 2021-03-05
Payer: COMMERCIAL

## 2021-03-05 DIAGNOSIS — Z01.818 PREOP EXAMINATION: Primary | ICD-10-CM

## 2021-03-05 PROCEDURE — G8417 CALC BMI ABV UP PARAM F/U: HCPCS | Performed by: NURSE PRACTITIONER

## 2021-03-05 PROCEDURE — G8428 CUR MEDS NOT DOCUMENT: HCPCS | Performed by: NURSE PRACTITIONER

## 2021-03-05 PROCEDURE — 99211 OFF/OP EST MAY X REQ PHY/QHP: CPT | Performed by: NURSE PRACTITIONER

## 2021-03-06 LAB — SARS-COV-2: NOT DETECTED

## 2021-03-07 ENCOUNTER — ANESTHESIA EVENT (OUTPATIENT)
Dept: ENDOSCOPY | Age: 46
End: 2021-03-07
Payer: COMMERCIAL

## 2021-03-07 NOTE — H&P
[address] [facility], [facility],4Th Floor,  [address] [facility], [address] [place] [address]  Phone: [phone]   OFR:[phone]    CHIEF COMPLAINT     Colon cancer screening    HPI     Thank you Lukasz Santos MD for asking me to see Tomy Perez in consultation. Susan Coppola is a 54 y.o. male Single [1] Black [2] with medical history of moderate persistent asthma, and recurrent pulmonary emboli in 2018 on Eliquis seen independently with referral for colon cancer screening. Denies abdominal pain, nausea, vomiting, fever, chills, unintentional weight loss, constipation, diarrhea, hematochezia or melenic stools. No family history of colon cancer.     Last Encounter Reviewed: None  Pertinent PMH, FH, SH is reviewed below. Last EGD: None  Last Colonoscopy: None     Review of available records reveals:   Wt Readings from Last 50 Encounters:   02/18/21 256 lb (116.1 kg)   01/07/21 249 lb (112.9 kg)   01/06/21 250 lb (113.4 kg)   12/10/20 246 lb (111.6 kg)   03/04/20 248 lb 14.4 oz (112.9 kg)   02/26/20 249 lb (112.9 kg)   02/19/20 246 lb 0.5 oz (111.6 kg)   02/13/20 246 lb (111.6 kg)   01/13/20 241 lb (109.3 kg)   08/28/19 247 lb (112 kg)   04/03/19 250 lb (113.4 kg)   02/25/19 248 lb (112.5 kg)   02/04/19 247 lb (112 kg)   01/14/19 244 lb (110.7 kg)   07/10/18 229 lb (103.9 kg)   06/14/18 227 lb (103 kg)   06/13/18 228 lb (103.4 kg)   06/08/18 225 lb (102.1 kg)   05/14/18 235 lb (106.6 kg)   04/16/18 234 lb (106.1 kg)   12/08/17 227 lb (103 kg)   12/02/17 227 lb (103 kg)   10/05/17 227 lb (103 kg)   07/31/17 226 lb 9.6 oz (102.8 kg)   07/13/17 225 lb (102.1 kg)   06/29/17 227 lb 8.2 oz (103.2 kg)   04/24/17 235 lb (106.6 kg)   07/19/16 227 lb 9.6 oz (103.2 kg)   07/09/16 235 lb (106.6 kg)   05/14/15 230 lb (104.3 kg)   07/22/14 212 lb (96.2 kg)   06/27/13 195 lb (88.5 kg)   02/25/13 213 lb (96.6 kg)   07/02/12 216 lb (98 kg)   06/21/12 211 lb (95.7 kg)   03/16/12 220 lb (99.8 kg)   04/21/11 211 lb (95.7 kg) 03/30/11 208 lb (94.3 kg)   03/17/11 209 lb (94.8 kg)   01/03/11 208 lb (94.3 kg)   11/12/10 207 lb (93.9 kg)   10/29/10 206 lb (93.4 kg)   09/29/10 210 lb (95.3 kg)       No components found for: HGBA1C  BP Readings from Last 3 Encounters:   02/18/21 124/75   01/07/21 128/70   12/10/20 118/82     Health Maintenance   Topic Date Due    Hepatitis C screen  Never done    Pneumococcal 0-64 years Vaccine (1 of 1 - PPSV23) Never done    Diabetes screen  Never done    Flu vaccine (1) 12/10/2021 (Originally 9/1/2020)    Lipid screen  01/05/2026    DTaP/Tdap/Td vaccine (3 - Td) 12/10/2030    HIV screen  Completed    Hepatitis A vaccine  Aged Out    Hepatitis B vaccine  Aged Out    Hib vaccine  Aged Out    Meningococcal (ACWY) vaccine  Aged Out       No components found for: [place]     PAST MEDICAL HISTORY     Past Medical History:   Diagnosis Date    Asthma     since P.E.    Collar bone fracture     BIJAL (generalized anxiety disorder)     Migraine     Moderate episode of recurrent major depressive disorder ([address] 05/01/2018    Pulmonary emboli ([facility] [address]     2017, 2018    Sleep apnea 2021    just dx'ed; getting CPAP     FAMILY HISTORY     Family History   Problem Relation Age of Onset    Diabetes Mother     Diabetes Sister     Clotting Disorder Maternal Grandfather     Diabetes Sister     Clotting Disorder Father      SOCIAL HISTORY     Social History     Socioeconomic History    Marital status: Single     Spouse name: Not on file    Number of children: 2    Years of education: Not on file    Highest education level: Not on file   Occupational History    Occupation: [employer]    Social Needs    Financial resource strain: Not on file    Food insecurity     Worry: Not on file     Inability: Not on file   [employer] needs     Medical: Not on file     Non-medical: Not on file   Tobacco Use    Smoking status: Former Smoker     Packs/day: 0.50     Years: 25.00     Pack years: 12.50 Types: Cigarettes     Start date: 1989     Quit date: 2015     Years since quittin.5    Smokeless tobacco: Never Used    Tobacco comment: started to smoke at 12 / smoked up to 1 to 1.5 p.p.d / quit smoking 2015   Substance and Sexual Activity    Alcohol use:  Yes     Alcohol/week: 2.0 standard drinks     Types: 2 Cans of beer per week     Comment: socially    Drug use: No    Sexual activity: Yes     Partners: Female     Comment: 1 child   Lifestyle    Physical activity     Days per week: Not on file     Minutes per session: Not on file    Stress: Not on file   Relationships    Social connections     Talks on phone: Not on file     Gets together: Not on file     Attends Sikh service: Not on file     Active member of club or organization: Not on file     Attends meetings of clubs or organizations: Not on file     Relationship status: Not on file    Intimate partner violence     Fear of current or ex partner: Not on file     Emotionally abused: Not on file     Physically abused: Not on file     Forced sexual activity: Not on file   Other Topics Concern    Not on file   Social History Narrative    Lives with mother-December 10, 2020      SURGICAL HISTORY     Past Surgical History:   Procedure Laterality Date    APPENDECTOMY  2019    FOREARM SURGERY Left 2000    HERNIA REPAIR Right 2009    inguinal     CURRENT MEDICATIONS   (This list may include medications prescribed during this encounter as epic can not insert only the list prior to this encounter.)  Current Outpatient Rx   Medication Sig Dispense Refill    citalopram (CELEXA) 20 MG tablet Take 1 tablet by mouth daily 90 tablet 2    polyethylene glycol (MIRALAX) 17 GM/SCOOP POWD powder Take 238 g by mouth daily Take as directed for colonoscopy 255 g 0    apixaban (ELIQUIS) 5 MG TABS tablet TAKE 1 TABLET BY MOUTH TWICE A DAY 60 tablet 8    amphetamine-dextroamphetamine (ADDERALL XR) 15 MG extended release capsule Take 1 capsule by mouth daily for 30 days. 30 capsule 0    budesonide-formoterol (SYMBICORT) 160-4.5 MCG/ACT AERO Inhale 2 puffs into the lungs 2 times daily 1 Inhaler 6    albuterol sulfate HFA (PROVENTIL HFA) 108 (90 Base) MCG/ACT inhaler Inhale 2 puffs into the lungs every 4 hours as needed for Wheezing or Shortness of Breath 1 Inhaler 11    valACYclovir (VALTREX) 500 MG tablet Take 1 tablet by mouth daily 30 tablet 5     ALLERGIES   No Known Allergies  IMMUNIZATIONS     Immunization History   Administered Date(s) Administered    Influenza 10/29/2010    Tdap (Boostrix, Adacel) 10/29/2010, 12/10/2020     REVIEW OF SYSTEMS   See HPI for further details and pertinent postiives. Negative for the following:  Constitutional: Negative for weight change. Negative for appetite change and fatigue. HENT: Negative for nosebleeds, sore throat, mouth sores, and voice change. Respiratory: Negative for cough, choking and chest tightness. Cardiovascular: Negative for chest pain   Gastrointestinal: See HPI  Musculoskeletal: Negative for arthralgias. Skin: Negative for pallor. Neurological: Negative for weakness and light-headedness. Hematological: Negative for adenopathy. Does not bruise/bleed easily. Psychiatric/Behavioral: Negative for suicidal ideas. PHYSICAL EXAM   VITAL SIGNS: Ht 6' 6\" (1.981 m)   Wt 250 lb (113.4 kg)   BMI 28.89 kg/m²   Wt Readings from Last 3 Encounters:   02/18/21 256 lb (116.1 kg)   01/07/21 249 lb (112.9 kg)   01/06/21 250 lb (113.4 kg)     Constitutional: Well developed, Well nourished, No acute distress, Non-toxic appearance. HENT: Normocephalic, Atraumatic, Bilateral external ears normal, Oropharynx moist, No oral exudates, Nose normal.   Eyes: Conjunctiva normal, No discharge. Neck: Normal range of motion, No tenderness, Supple, No stridor. Lymphatic: No cervical, subclavian, or axillary lymphadenopathy. Cardiovascular: Normal heart rate, Normal rhythm, No murmurs, No rubs, No gallops. Thorax & Lungs: Normal breath sounds, No respiratory distress, No wheezing, No chest tenderness. Abdomen:  normal bowel sounds, soft, non tender, non distended, scars consistent with stated surgeries, no hernias   Rectal:  Deferred. Skin: Warm, Dry, No erythema, No rash. No bruising. No spider hemangiomas. Back: No tenderness, No CVA tenderness. Lower Extremities: Intact distal pulses, No edema, No tenderness, No cyanosis, No clubbing. Neurologic: Alert & oriented x 3, Normal motor function, Normal sensory function, No focal deficits noted. No asterixis. RADIOLOGY/PROCEDURES   No results found. FINAL IMPRESSION   Colon cancer screening -average risk  -     COLONOSCOPY W/ OR W/O BIOPSY  -     Covid-19 Ambulatory; Future  -      Patient to hold Eliquis 48 hours prior to planned colonoscopy.     Colonoscopy with alternatives, rationale, benefits and risks, not limited to bleeding, infection, perforation, need of surgery, prolong hospital stay and anesthesia side effects were explained. Patient verbalized understanding and agrees to proceed with colonoscopy. ORDERED FUTURE/PENDING TESTS     Lab Frequency Next Occurrence   Covid-19 Ambulatory Once 02/18/2021       FOLLOWUP   No follow-ups on file.           Cydney Orona 3/7/21 10:16 AM EST    CC:  Arlene Bliss MD

## 2021-03-08 ENCOUNTER — ANESTHESIA (OUTPATIENT)
Dept: ENDOSCOPY | Age: 46
End: 2021-03-08
Payer: COMMERCIAL

## 2021-03-08 ENCOUNTER — HOSPITAL ENCOUNTER (OUTPATIENT)
Age: 46
Setting detail: OUTPATIENT SURGERY
Discharge: HOME | End: 2021-03-08
Attending: INTERNAL MEDICINE | Admitting: INTERNAL MEDICINE
Payer: COMMERCIAL

## 2021-03-08 VITALS — SYSTOLIC BLOOD PRESSURE: 122 MMHG | DIASTOLIC BLOOD PRESSURE: 88 MMHG | OXYGEN SATURATION: 97 %

## 2021-03-08 VITALS
WEIGHT: 250 LBS | HEART RATE: 76 BPM | SYSTOLIC BLOOD PRESSURE: 132 MMHG | DIASTOLIC BLOOD PRESSURE: 88 MMHG | HEIGHT: 78 IN | RESPIRATION RATE: 18 BRPM | TEMPERATURE: 96.9 F | OXYGEN SATURATION: 100 % | BODY MASS INDEX: 28.93 KG/M2

## 2021-03-08 PROCEDURE — 2580000003 HC RX 258: Performed by: INTERNAL MEDICINE

## 2021-03-08 PROCEDURE — 6360000002 HC RX W HCPCS: Performed by: NURSE ANESTHETIST, CERTIFIED REGISTERED

## 2021-03-08 PROCEDURE — 7100000011 HC PHASE II RECOVERY - ADDTL 15 MIN: Performed by: INTERNAL MEDICINE

## 2021-03-08 PROCEDURE — 6370000000 HC RX 637 (ALT 250 FOR IP): Performed by: INTERNAL MEDICINE

## 2021-03-08 PROCEDURE — 3609027000 HC COLONOSCOPY: Performed by: INTERNAL MEDICINE

## 2021-03-08 PROCEDURE — 3700000000 HC ANESTHESIA ATTENDED CARE: Performed by: INTERNAL MEDICINE

## 2021-03-08 PROCEDURE — 45378 DIAGNOSTIC COLONOSCOPY: CPT | Performed by: INTERNAL MEDICINE

## 2021-03-08 PROCEDURE — 2500000003 HC RX 250 WO HCPCS: Performed by: NURSE ANESTHETIST, CERTIFIED REGISTERED

## 2021-03-08 PROCEDURE — 7100000010 HC PHASE II RECOVERY - FIRST 15 MIN: Performed by: INTERNAL MEDICINE

## 2021-03-08 PROCEDURE — 3700000001 HC ADD 15 MINUTES (ANESTHESIA): Performed by: INTERNAL MEDICINE

## 2021-03-08 PROCEDURE — 2709999900 HC NON-CHARGEABLE SUPPLY: Performed by: INTERNAL MEDICINE

## 2021-03-08 RX ORDER — SIMETHICONE 20 MG/.3ML
EMULSION ORAL PRN
Status: DISCONTINUED | OUTPATIENT
Start: 2021-03-08 | End: 2021-03-08 | Stop reason: ALTCHOICE

## 2021-03-08 RX ORDER — LABETALOL HYDROCHLORIDE 5 MG/ML
5 INJECTION, SOLUTION INTRAVENOUS EVERY 10 MIN PRN
Status: DISCONTINUED | OUTPATIENT
Start: 2021-03-08 | End: 2021-03-08 | Stop reason: HOSPADM

## 2021-03-08 RX ORDER — ONDANSETRON 2 MG/ML
4 INJECTION INTRAMUSCULAR; INTRAVENOUS PRN
Status: DISCONTINUED | OUTPATIENT
Start: 2021-03-08 | End: 2021-03-08 | Stop reason: HOSPADM

## 2021-03-08 RX ORDER — SODIUM CHLORIDE, SODIUM LACTATE, POTASSIUM CHLORIDE, CALCIUM CHLORIDE 600; 310; 30; 20 MG/100ML; MG/100ML; MG/100ML; MG/100ML
INJECTION, SOLUTION INTRAVENOUS CONTINUOUS
Status: DISCONTINUED | OUTPATIENT
Start: 2021-03-08 | End: 2021-03-08 | Stop reason: HOSPADM

## 2021-03-08 RX ORDER — MORPHINE SULFATE 2 MG/ML
1 INJECTION, SOLUTION INTRAMUSCULAR; INTRAVENOUS EVERY 5 MIN PRN
Status: DISCONTINUED | OUTPATIENT
Start: 2021-03-08 | End: 2021-03-08 | Stop reason: HOSPADM

## 2021-03-08 RX ORDER — OXYCODONE HYDROCHLORIDE AND ACETAMINOPHEN 5; 325 MG/1; MG/1
1 TABLET ORAL PRN
Status: DISCONTINUED | OUTPATIENT
Start: 2021-03-08 | End: 2021-03-08 | Stop reason: HOSPADM

## 2021-03-08 RX ORDER — PROPOFOL 10 MG/ML
INJECTION, EMULSION INTRAVENOUS PRN
Status: DISCONTINUED | OUTPATIENT
Start: 2021-03-08 | End: 2021-03-08 | Stop reason: SDUPTHER

## 2021-03-08 RX ORDER — OXYCODONE HYDROCHLORIDE AND ACETAMINOPHEN 5; 325 MG/1; MG/1
2 TABLET ORAL PRN
Status: DISCONTINUED | OUTPATIENT
Start: 2021-03-08 | End: 2021-03-08 | Stop reason: HOSPADM

## 2021-03-08 RX ORDER — HYDRALAZINE HYDROCHLORIDE 20 MG/ML
5 INJECTION INTRAMUSCULAR; INTRAVENOUS EVERY 10 MIN PRN
Status: DISCONTINUED | OUTPATIENT
Start: 2021-03-08 | End: 2021-03-08 | Stop reason: HOSPADM

## 2021-03-08 RX ORDER — MEPERIDINE HYDROCHLORIDE 50 MG/ML
12.5 INJECTION INTRAMUSCULAR; INTRAVENOUS; SUBCUTANEOUS EVERY 5 MIN PRN
Status: DISCONTINUED | OUTPATIENT
Start: 2021-03-08 | End: 2021-03-08 | Stop reason: HOSPADM

## 2021-03-08 RX ORDER — MORPHINE SULFATE 2 MG/ML
2 INJECTION, SOLUTION INTRAMUSCULAR; INTRAVENOUS EVERY 5 MIN PRN
Status: DISCONTINUED | OUTPATIENT
Start: 2021-03-08 | End: 2021-03-08 | Stop reason: HOSPADM

## 2021-03-08 RX ORDER — PROMETHAZINE HYDROCHLORIDE 25 MG/ML
6.25 INJECTION, SOLUTION INTRAMUSCULAR; INTRAVENOUS
Status: DISCONTINUED | OUTPATIENT
Start: 2021-03-08 | End: 2021-03-08 | Stop reason: HOSPADM

## 2021-03-08 RX ORDER — LIDOCAINE HYDROCHLORIDE 20 MG/ML
INJECTION, SOLUTION INFILTRATION; PERINEURAL PRN
Status: DISCONTINUED | OUTPATIENT
Start: 2021-03-08 | End: 2021-03-08 | Stop reason: SDUPTHER

## 2021-03-08 RX ORDER — DIPHENHYDRAMINE HYDROCHLORIDE 50 MG/ML
12.5 INJECTION INTRAMUSCULAR; INTRAVENOUS
Status: DISCONTINUED | OUTPATIENT
Start: 2021-03-08 | End: 2021-03-08 | Stop reason: HOSPADM

## 2021-03-08 RX ADMIN — SODIUM CHLORIDE, POTASSIUM CHLORIDE, SODIUM LACTATE AND CALCIUM CHLORIDE: 600; 310; 30; 20 INJECTION, SOLUTION INTRAVENOUS at 12:31

## 2021-03-08 RX ADMIN — PROPOFOL 300 MG: 10 INJECTION, EMULSION INTRAVENOUS at 12:37

## 2021-03-08 RX ADMIN — SODIUM CHLORIDE, POTASSIUM CHLORIDE, SODIUM LACTATE AND CALCIUM CHLORIDE: 600; 310; 30; 20 INJECTION, SOLUTION INTRAVENOUS at 11:24

## 2021-03-08 RX ADMIN — LIDOCAINE HYDROCHLORIDE 60 MG: 20 INJECTION, SOLUTION INFILTRATION; PERINEURAL at 12:37

## 2021-03-08 ASSESSMENT — PAIN - FUNCTIONAL ASSESSMENT: PAIN_FUNCTIONAL_ASSESSMENT: 0-10

## 2021-03-08 NOTE — ANESTHESIA PRE PROCEDURE
Department of Anesthesiology  Preprocedure Note       Name:  Everett Richardson   Age:  54 y.o.  :  1975                                          MRN:  [id number]         Date:  3/8/2021      Surgeon: Cristian Francisco):  Seng Downs MD    Procedure: Procedure(s):  COLONOSCOPY WITH ANESTHESIA -SLEEP APNEA-    Medications prior to admission:   Prior to Admission medications    Medication Sig Start Date End Date Taking? Authorizing Provider   citalopram (CELEXA) 20 MG tablet Take 1 tablet by mouth daily 21  Yes Germán Higgins MD   polyethylene glycol [facility]) 17 GM/SCOOP POWD powder Take 238 g by mouth daily Take as directed for colonoscopy 21  Yes Seng Downs MD   amphetamine-dextroamphetamine (ADDERALL XR) 15 MG extended release capsule Take 1 capsule by mouth daily for 30 days.  2/2/21 3/8/21 Yes Germán Higgins MD   budesonide-formoterol [facility]) 160-4.5 MCG/ACT AERO Inhale 2 puffs into the lungs 2 times daily 21  Yes Naty Santos MD   albuterol sulfate HFA (PROVENTIL HFA) 108 (90 Base) MCG/ACT inhaler Inhale 2 puffs into the lungs every 4 hours as needed for Wheezing or Shortness of Breath 21 Yes Naty Santos MD   valACYclovir (VALTREX) 500 MG tablet Take 1 tablet by mouth daily 12/10/20  Yes Liborio Thompson MD   apixaban (ELIQUIS) 5 MG TABS tablet TAKE 1 TABLET BY MOUTH TWICE A DAY 2/15/21   Germán Higgins MD       Current medications:    Current Facility-Administered Medications   Medication Dose Route Frequency Provider Last Rate Last Admin    lactated ringers infusion   Intravenous Continuous Seng Downs MD 30 mL/hr at 21 1124 New Bag at 21 1124       Allergies:  No Known Allergies    Problem List:    Patient Active Problem List   Diagnosis Code    Chronic tension headaches G44.229    Gastritis K29.70    Insomnia G47.00    Bilateral pulmonary embolism (HCC) I26.99    Chest pain varying with breathing R07.1    Pulmonary infarct ([facility] I26.99    Moderate episode of recurrent major depressive disorder ([facility] F33.1    BIJAL (generalized anxiety disorder) F41.1       Past Medical History:        Diagnosis Date    Asthma     since P.E.    Collar bone fracture     BIJAL (generalized anxiety disorder)     Migraine     Moderate episode of recurrent major depressive disorder ([facility] 2018    Pulmonary emboli ([facility]     ,     Sleep apnea     just dx'ed; getting CPAP       Past Surgical History:        Procedure Laterality Date    APPENDECTOMY  2019    FOREARM SURGERY Left 2000    HERNIA REPAIR Right 2009    inguinal       Social History:    Social History     Tobacco Use    Smoking status: Former Smoker     Packs/day: 0.50     Years: 25.00     Pack years: 12.50     Types: Cigarettes     Start date: 1989     Quit date: 2015     Years since quittin.5    Smokeless tobacco: Never Used    Tobacco comment: started to smoke at 12 / smoked up to 1 to 1.5 p.p.d / quit smoking 2015   Substance Use Topics    Alcohol use:  Yes     Alcohol/week: 2.0 standard drinks     Types: 2 Cans of beer per week     Comment: socially                                Counseling given: Not Answered  Comment: started to smoke at 14 / smoked up to 1 to 1.5 p.p.d / quit smoking 2015      Vital Signs (Current):   Vitals:    21 1343 21 1116   BP:  134/85   Pulse:  60   Resp:  18   Temp:  96.9 °F (36.1 °C)   TempSrc:  Temporal   SpO2:  97%   Weight: 250 lb (113.4 kg) 250 lb (113.4 kg)   Height: 6' 6\" (1.981 m) 6' 6\" (1.981 m)                                              BP Readings from Last 3 Encounters:   21 134/85   21 124/75   21 128/70       NPO Status: Time of last liquid consumption: 2330                        Time of last solid consumption: 0900                        Date of last liquid consumption: 21                        Date of last solid food consumption: 21    BMI:   Wt Readings from Last 3 Encounters:   03/08/21 250 lb (113.4 kg)   02/18/21 256 lb (116.1 kg)   01/07/21 249 lb (112.9 kg)     Body mass index is 28.89 kg/m². CBC:   Lab Results   Component Value Date    WBC 3.7 02/25/2019    RBC 5.03 02/25/2019    HGB 14.9 02/25/2019    HCT 44.0 02/25/2019    MCV 87.4 02/25/2019    RDW 14.1 02/25/2019     02/25/2019       CMP:   Lab Results   Component Value Date     02/25/2019    K 3.9 02/25/2019     02/25/2019    CO2 28 02/25/2019    BUN 14 02/25/2019    CREATININE 1.2 02/25/2019    GFRAA >60 02/25/2019    GFRAA >60 10/29/2010    AGRATIO 1.4 02/25/2019    LABGLOM >60 02/25/2019    GLUCOSE 101 01/05/2021    PROT 7.4 02/25/2019    PROT 7.4 10/29/2010    CALCIUM 9.4 02/25/2019    BILITOT 0.3 02/25/2019    ALKPHOS 54 02/25/2019    AST 30 02/25/2019    ALT 26 02/25/2019       POC Tests: No results for input(s): POCGLU, POCNA, POCK, POCCL, POCBUN, POCHEMO, POCHCT in the last 72 hours.     Coags:   Lab Results   Component Value Date    PROTIME 13.5 12/02/2017    INR 1.19 12/02/2017    APTT 21.3 06/29/2017       HCG (If Applicable): No results found for: PREGTESTUR, PREGSERUM, HCG, HCGQUANT     ABGs: No results found for: PHART, PO2ART, GEW1RHY, DFB3PAV, BEART, X2SPLWQW     Type & Screen (If Applicable):  No results found for: LABABO, LABRH    Drug/Infectious Status (If Applicable):  No results found for: HIV, HEPCAB    COVID-19 Screening (If Applicable):   Lab Results   Component Value Date    COVID19 Not Detected 03/05/2021         Anesthesia Evaluation  Patient summary reviewed no history of anesthetic complications:   Airway: Mallampati: III  TM distance: >3 FB   Neck ROM: full  Mouth opening: > = 3 FB Dental: normal exam         Pulmonary:Negative Pulmonary ROS and normal exam  breath sounds clear to auscultation  (+) sleep apnea:  asthma:                            Cardiovascular:Negative CV ROS  Exercise tolerance: good (>4 METS),           Rhythm: regular  Rate: normal Neuro/Psych:   Negative Neuro/Psych ROS  (+) headaches:, psychiatric history:            GI/Hepatic/Renal: Neg GI/Hepatic/Renal ROS            Endo/Other: Negative Endo/Other ROS                    Abdominal:           Vascular: negative vascular ROS.  + PE.                                  Pre-Operative Diagnosis: Colon cancer screening [Z12.11]    54 y.o.   BMI:  Body mass index is 28.89 kg/m². Vitals:    21 1343 21 1116   BP:  134/85   Pulse:  60   Resp:  18   Temp:  96.9 °F (36.1 °C)   TempSrc:  Temporal   SpO2:  97%   Weight: 250 lb (113.4 kg) 250 lb (113.4 kg)   Height: 6' 6\" (1.981 m) 6' 6\" (1.981 m)       No Known Allergies    Social History     Tobacco Use    Smoking status: Former Smoker     Packs/day: 0.50     Years: 25.00     Pack years: 12.50     Types: Cigarettes     Start date: 1989     Quit date: 2015     Years since quittin.5    Smokeless tobacco: Never Used    Tobacco comment: started to smoke at 12 / smoked up to 1 to 1.5 p.p.d / quit smoking 2015   Substance Use Topics    Alcohol use:  Yes     Alcohol/week: 2.0 standard drinks     Types: 2 Cans of beer per week     Comment: socially       LABS:    CBC  Lab Results   Component Value Date/Time    WBC 3.7 (L) 2019 08:32 PM    HGB 14.9 2019 08:32 PM    HCT 44.0 2019 08:32 PM     (L) 2019 08:32 PM     RENAL  Lab Results   Component Value Date/Time     2019 08:32 PM    K 3.9 2019 08:32 PM     2019 08:32 PM    CO2 28 2019 08:32 PM    BUN 14 2019 08:32 PM    CREATININE 1.2 2019 08:32 PM    GLUCOSE 101 (H) 2021 03:23 PM     COAGS  Lab Results   Component Value Date/Time    PROTIME 13.5 (H) 2017 07:32 PM    INR 1.19 (H) 2017 07:32 PM    APTT 21.3 2017 02:12 AM     EKG 19  Normal sinus rhythmSeptal infarct , age undeterminedAbnormal ECGConfirmed by Shivani Hartley ([id number]) on 2019 4:48:06 PM    6/25/18 echo   Summary   Normal left ventricle size, wall thickness, and systolic function with an   estimated ejection fraction of 50-55%. No regional wall motion abnormalities are seen. Normal diastolic function. Mild mitral regurgitation is present. Mild tricuspid regurgitation. Estimated pulmonary artery systolic pressure is normal at 23 mmHg assuming a   right atrial pressure of 3 mmHg. Anesthesia Plan      general     ASA 2     (I discussed with the patient the risks and benefits of PIV, anesthesia, IV Narcotics, PACU. All questions were answered the patient agrees with the plan and wishes to proceed)  Induction: intravenous.                           Diana Barone MD   3/8/2021

## 2021-03-08 NOTE — OP NOTE
[address] ,  [address] [facility]  Phone: [phone]  [facility],  [address] [place] [address], [address] [place] [address]  Phone: [phone]   OJB:[phone]    Colonoscopy Procedure Note    Patient: Shari Guerrero  : 1975    Procedure: Colonoscopy --screening    Date:  3/8/2021     Endoscopist:  Chinedu Kim MD    Referring Physician:  Corinne Arias MD    Preoperative Diagnosis:  Colon cancer screening [Z12.11]    Postoperative Diagnosis:  See impression    Anesthesia: Anesthesia: MAC  Sedation: Propofol per anesthesia  Start Time: 12:37  Stop Time: 12:47  ASA Class: 1  Mallampati: I (soft palate, uvula, fauces, tonsillar pillars visible)    Indications: This is a 54y.o. year old male with medical history of moderate persistent asthma, and recurrent pulmonary emboli in 2018 on [facility] seen independently with referral for colon cancer screening    Procedure Details  Informed consent was obtained for the procedure, including sedation. Risks of perforation, hemorrhage, adverse drug reaction and aspiration were discussed. The patient was placed in the left lateral decubitus position. Based on the pre-procedure assessment, including review of the patient's medical history, medications, allergies, and review of systems, he had been deemed to be an appropriate candidate for sedation; he was therefore sedated with the medications listed below. The patient was monitored continuously with ECG tracing, pulse oximetry, blood pressure monitoring, and direct observations. rectal examination was performed. There were no external hemorrhoids, fissures or skin tags. The colonoscope was inserted into the rectum and advanced under direct vision to the cecum, which was identified by the ileocecal valve and appendiceal orifice.   The right colon was examined twice as this increases polyp detection especially if other right colon polyps, older age, male, or knapp syndrome. When segments could not be distended with CO2 or air, it was filled/distended with water. The quality of the colonic preparation was adequate. A careful inspection was made as the colonoscope was withdrawn, including a retroflexed view of the rectum; findings and interventions are described below. Appropriate photodocumentation Was Obtained: Appendiceal orifice and ileocecal valve. Findings:  Chyme throughout the right sided colon into the cecum which was unable to be lavaged and suctioned precluding adequate visualization right colon. Medium sized non bleeding internal hemorrhoids. No gross obstructing lesion seen in the colon. .    - PREP: Miralax  - Overall difficulty: mild in degree  - Abdominal pressure: yes -left lower quadrant  - Change in position: no  - Anesthesia issues: no  - Endocoff/Amplieye use: no    Specimens: Was Not Obtained    Complications:   None; patient tolerated the procedure well. Disposition:   PACU - hemodynamically stable. Estimated Blood loss:  none    Withdrawal Time:  7 minutes    Impression:   Chyme throughout the right sided colon into the cecum. Medium sized non bleeding internal hemorrhoids. No gross obstructing lesion seen in the colon. Recommendations:  -Repeat colonoscopy within 1 year with extended bowel prep due suboptimal bowel prep.  -Resume Eliquis today.       Yelitza Rodriguez 3/8/21 12:47 PM EST

## 2021-03-08 NOTE — ANESTHESIA POSTPROCEDURE EVALUATION
Department of Anesthesiology  Postprocedure Note    Patient: Cindy Hernández  MRN: [id number]  YOB: 1975  Date of evaluation: 3/8/2021  Time:  1:22 PM     Procedure Summary     Date: 03/08/21 Room / Location: [facility] [address] [place] [address] / [facility]    Anesthesia Start: 1231 Anesthesia Stop:     Procedure: COLONOSCOPY WITH ANESTHESIA -SLEEP APNEA- (N/A ) Diagnosis:       Colon cancer screening      (Colon cancer screening [Z12.11])    Surgeons: Jarrod Hendrickson MD Responsible Provider: Hermila Brown MD    Anesthesia Type: general ASA Status: 2          Anesthesia Type: No value filed. Mireya Phase I: Mireya Score: 10    Mireya Phase II: Mireya Score: 9    Last vitals: Reviewed and per EMR flowsheets.        Anesthesia Post Evaluation    Comments: Postoperative Anesthesia Note    Name:    Cindy Hernández  MRN:      [id number]    Patient Vitals in the past 12 hrs:  03/08/21 1251, BP:116/73, Pulse:71, Resp:16, SpO2:96 %  03/08/21 1116, BP:134/85, Temp:96.9 °F (36.1 °C), Temp src:Temporal, Pulse:60, Resp:18, SpO2:97 %, Height:6' 6\" (1.981 m), Weight:250 lb (113.4 kg)     LABS:    CBC  Lab Results       Component                Value               Date/Time                  WBC                      3.7 (L)             02/25/2019 08:32 PM        HGB                      14.9                02/25/2019 08:32 PM        HCT                      44.0                02/25/2019 08:32 PM        PLT                      132 (L)             02/25/2019 08:32 PM   RENAL  Lab Results       Component                Value               Date/Time                  NA                       139                 02/25/2019 08:32 PM        K                        3.9                 02/25/2019 08:32 PM        CL                       100                 02/25/2019 08:32 PM        CO2                      28                  02/25/2019 08:32 PM        BUN                      14                  02/25/2019 08:32 PM CREATININE               1.2                 02/25/2019 08:32 PM        GLUCOSE                  101 (H)             01/05/2021 03:23 PM   COAGS  Lab Results       Component                Value               Date/Time                  PROTIME                  13.5 (H)            12/02/2017 07:32 PM        INR                      1.19 (H)            12/02/2017 07:32 PM        APTT                     21.3                06/29/2017 02:12 AM     Intake & Output:  @32ERJX@    Nausea & Vomiting:  No    Level of Consciousness:  Awake    Pain Assessment:  Adequate analgesia    Anesthesia Complications:  No apparent anesthetic complications    SUMMARY      Vital signs stable  OK to discharge from Stage I post anesthesia care.   Care transferred from Anesthesiology department on discharge from perioperative area

## 2021-04-06 ENCOUNTER — VIRTUAL VISIT (OUTPATIENT)
Dept: PULMONOLOGY | Age: 46
End: 2021-04-06
Payer: COMMERCIAL

## 2021-04-06 DIAGNOSIS — G47.33 OBSTRUCTIVE SLEEP APNEA SYNDROME: Primary | ICD-10-CM

## 2021-04-06 DIAGNOSIS — G25.81 RLS (RESTLESS LEGS SYNDROME): ICD-10-CM

## 2021-04-06 DIAGNOSIS — F41.1 GAD (GENERALIZED ANXIETY DISORDER): Chronic | ICD-10-CM

## 2021-04-06 PROCEDURE — G8427 DOCREV CUR MEDS BY ELIG CLIN: HCPCS | Performed by: INTERNAL MEDICINE

## 2021-04-06 PROCEDURE — 99214 OFFICE O/P EST MOD 30 MIN: CPT | Performed by: INTERNAL MEDICINE

## 2021-04-06 RX ORDER — PRAMIPEXOLE DIHYDROCHLORIDE 0.25 MG/1
TABLET ORAL
Qty: 90 TABLET | Refills: 2 | Status: SHIPPED | OUTPATIENT
Start: 2021-04-06 | End: 2021-11-09 | Stop reason: SDUPTHER

## 2021-04-06 ASSESSMENT — SLEEP AND FATIGUE QUESTIONNAIRES
HOW LIKELY ARE YOU TO NOD OFF OR FALL ASLEEP WHILE SITTING QUIETLY AFTER LUNCH WITHOUT ALCOHOL: 0
HOW LIKELY ARE YOU TO NOD OFF OR FALL ASLEEP WHILE LYING DOWN TO REST IN THE AFTERNOON WHEN CIRCUMSTANCES PERMIT: 0
ESS TOTAL SCORE: 0
HOW LIKELY ARE YOU TO NOD OFF OR FALL ASLEEP WHILE SITTING AND TALKING TO SOMEONE: 0
HOW LIKELY ARE YOU TO NOD OFF OR FALL ASLEEP IN A CAR, WHILE STOPPED FOR A FEW MINUTES IN TRAFFIC: 0
HOW LIKELY ARE YOU TO NOD OFF OR FALL ASLEEP WHILE SITTING INACTIVE IN A PUBLIC PLACE: 0

## 2021-04-06 NOTE — ASSESSMENT & PLAN NOTE
Chronic-progressing: We discussed both primary secondary causes of restless leg syndrome. Patient's blood work was normal for iron and thyroid. We discussed how the Celexa may be playing a role in his restless leg syndrome and he will need to discuss with Dr. Seth Borrego about possibly weaning off. We also discussed how untreated sleep apnea can worsen restless leg syndrome. At this point we will do a trial of Mirapex 0.125-0.75 mg nightly an hour before his symptoms started. We discussed how to take the meds and the possible side effects including nausea and pleasure seeking behavior.

## 2021-04-06 NOTE — ASSESSMENT & PLAN NOTE
Chronic- Stable. Discussed the importance of treating obstructive sleep apnea as part of the management of this disorder. Cont any meds per PCP and other physicians.

## 2021-04-06 NOTE — LETTER
[facility]  [id number] [facility]  [address] [address]  Phone: [phone]  Fax: [phone]    April 6, 2021       Patient: Ade Jenkins   MR Number: [id number]   YOB: 1975   Date of Visit: 4/6/2021       Megan Crouch was seen for a follow up visit today. Here is my assessment and plan as well as an attached copy of his visit today:    Obstructive sleep apnea syndrome  New Problem - On Tx. Reviewed sleep study  and download compliance data with patient. Supplies and parts as needed for his machine. These are medically necessary. Limit caffeine use after 3pm.    Instructed not to drive unless had 4 hrs of effective therapy for his VU the night before. Did review the risks of under or untreated VU including, but not limited to, higher risks of motor vehicle accidents, stroke, heart attacks, and death. He understands and accepts all these risks. We discussed how untreated sleep apnea can affect restless leg syndrome and drive his insomnia in the importance of treating all the different issues he has. The patient may need an in lab titration but we will first see if treating his restless leg syndrome will allow him to fall asleep with more consistency and use his machine. BIJAL (generalized anxiety disorder)  Chronic- Stable. Discussed the importance of treating obstructive sleep apnea as part of the management of this disorder. Cont any meds per PCP and other physicians. RLS (restless legs syndrome)  Chronic-progressing: We discussed both primary secondary causes of restless leg syndrome. Patient's blood work was normal for iron and thyroid. We discussed how the Celexa may be playing a role in his restless leg syndrome and he will need to discuss with Dr. Terry De La Fuente about possibly weaning off. We also discussed how untreated sleep apnea can worsen restless leg syndrome.   At this point we will do a trial of Mirapex 0.125-0.75 mg nightly an hour before his symptoms started. We discussed how to take the meds and the possible side effects including nausea and pleasure seeking behavior. If you have questions or concerns, please do not hesitate to call me. I look forward to following Tomy along with you.     Sincerely,    Svetlana Barr MD    CC providers:  MD Yesenia Smith [phone]  [facility] [facility] [address]

## 2021-04-06 NOTE — PROGRESS NOTES
Kamilla Bean MD, Julita Ramirez, [facility]  Tiffanie Kehrt CNP  Rahul Almita CNP [address]anne[address]  [address]ali [address] [facility], [address] [facility] [phone]   [facility] [address] Magali Teresa. [address] [place] [address]. [address] [phone]     Video Visit- Follow up    Pursuant to the emergency declaration under the [address] [place] [address] [facility] authority and the [employer] Act, this Virtual  Visit was conducted, with patient's consent, to reduce the patient's risk of exposure to COVID-19 and provide continuity of care for an established patient. Services were provided through a video synchronous discussion virtually to substitute for in-person clinic visit. Patient was located in their home. Assessment/Plan:     1. Obstructive sleep apnea syndrome  Assessment & Plan:  New Problem - On Tx. Reviewed sleep study  and download compliance data with patient. Supplies and parts as needed for his machine. These are medically necessary. Limit caffeine use after 3pm.    Instructed not to drive unless had 4 hrs of effective therapy for his VU the night before. Did review the risks of under or untreated VU including, but not limited to, higher risks of motor vehicle accidents, stroke, heart attacks, and death. He understands and accepts all these risks. We discussed how untreated sleep apnea can affect restless leg syndrome and drive his insomnia in the importance of treating all the different issues he has. The patient may need an in lab titration but we will first see if treating his restless leg syndrome will allow him to fall asleep with more consistency and use his machine. 2. RLS (restless legs syndrome)  Assessment & Plan:  Chronic-progressing: We discussed both primary secondary causes of restless leg syndrome. Patient's blood work was normal for iron and thyroid.   We discussed how the Celexa may be playing a role in his restless leg syndrome and he will need to discuss with Dr. Tg Perea about possibly weaning off. We also discussed how untreated sleep apnea can worsen restless leg syndrome. At this point we will do a trial of Mirapex 0.125-0.75 mg nightly an hour before his symptoms started. We discussed how to take the meds and the possible side effects including nausea and pleasure seeking behavior. Orders:  -     pramipexole (MIRAPEX) 0.25 MG tablet; 1/2-3 po qHS, start 1/2 pill qHS, can increase by 1/2 pill every 7 nights if still not sleeping to a max of 3 pills po qHS, Disp-90 tablet, R-2Normal  3. BIJAL (generalized anxiety disorder)  Assessment & Plan:  Chronic- Stable. Discussed the importance of treating obstructive sleep apnea as part of the management of this disorder. Cont any meds per PCP and other physicians. Reviewed, analyzed, and documented physiologic data from patient's PAP machine. This information was analyzed to assess complexity and medical decision making in regards to further testing and management. The primary encounter diagnosis was Obstructive sleep apnea syndrome. Diagnoses of RLS (restless legs syndrome) and BIJAL (generalized anxiety disorder) were also pertinent to this visit. The chronic medical conditions listed are directly related to the primary diagnosis listed above. The management of the primary diagnosis affects the secondary diagnosis and vice versa. Subjective:     Patient ID: Kristen Bell is a 54 y.o. male. Chief Complaint   Patient presents with    Sleep Apnea    Insomnia     Subjective   HPI:    Machine Modem/Download Info:  Compliance (hours/night): 0.25 hrs/night  % of nights >= 4 hrs: 0 %  Download AHI (/hour): 10.3 /HR  Average CPAP Pressure : 8.9 cmH2O APAP - Settings  Pressure Min: 6 cmH2O  Pressure Max: 16 cmH2O   Comfort Settings  Humidity Level (0-8): 3  Flex/EPR (0-3): 3          Insurance mandated HST on 2/8/21 showed RDI-11.8/hr and low sat 84%.     Patient is really not use his machine much since getting it. He feels he struggling to fall asleep and his legs are bothering him every night. When he takes melatonin he does sleep but wakes up groggy during the daytime. We reviewed the data from his download machine that shows when he actually used it for the 2 nights for over 3 hours the machine actually rapidly increase the pressure meaning he had actually fallen asleep and he was having apneas and that with the machine was adjusting but the patient is not perceiving the sleep he is getting. His legs are starting to bother him now every night and even when he is sitting for long periods of time at work. Lab Results   Component Value Date    IRON 122 01/05/2021    TIBC 321 01/05/2021    FERRITIN 104.4 01/05/2021     Lab Results   Component Value Date    TSHREFLEX 1.82 01/05/2021       [address]    [place] - Total score: 0    Current Outpatient Medications   Medication Sig Dispense Refill    pramipexole (MIRAPEX) 0.25 MG tablet 1/2-3 po qHS, start 1/2 pill qHS, can increase by 1/2 pill every 7 nights if still not sleeping to a max of 3 pills po qHS 90 tablet 2    citalopram (CELEXA) 20 MG tablet Take 1 tablet by mouth daily 90 tablet 2    apixaban (ELIQUIS) 5 MG TABS tablet TAKE 1 TABLET BY MOUTH TWICE A DAY 60 tablet 8    budesonide-formoterol (SYMBICORT) 160-4.5 MCG/ACT AERO Inhale 2 puffs into the lungs 2 times daily 1 Inhaler 6    albuterol sulfate HFA (PROVENTIL HFA) 108 (90 Base) MCG/ACT inhaler Inhale 2 puffs into the lungs every 4 hours as needed for Wheezing or Shortness of Breath 1 Inhaler 11    valACYclovir (VALTREX) 500 MG tablet Take 1 tablet by mouth daily 30 tablet 5    amphetamine-dextroamphetamine (ADDERALL XR) 15 MG extended release capsule Take 1 capsule by mouth daily for 30 days. 30 capsule 0     No current facility-administered medications for this visit.         Allergies as of 04/06/2021    (No Known Allergies) Electronically signed by Abrahan Neri MD on 4/6/2021 at 5:31 PM

## 2021-04-06 NOTE — ASSESSMENT & PLAN NOTE
New Problem - On Tx. Reviewed sleep study  and download compliance data with patient. Supplies and parts as needed for his machine. These are medically necessary. Limit caffeine use after 3pm.    Instructed not to drive unless had 4 hrs of effective therapy for his VU the night before. Did review the risks of under or untreated VU including, but not limited to, higher risks of motor vehicle accidents, stroke, heart attacks, and death. He understands and accepts all these risks. We discussed how untreated sleep apnea can affect restless leg syndrome and drive his insomnia in the importance of treating all the different issues he has. The patient may need an in lab titration but we will first see if treating his restless leg syndrome will allow him to fall asleep with more consistency and use his machine.

## 2021-04-08 ENCOUNTER — TELEPHONE (OUTPATIENT)
Dept: INTERNAL MEDICINE CLINIC | Age: 46
End: 2021-04-08

## 2021-04-08 NOTE — TELEPHONE ENCOUNTER
Patient informed, per Dr Karlee Morales, will need to be seen to discuss work excuse and symptoms. Patient scheduled for 05/11/2021.  He declined an earlier appt date b/c he wants an appt @

## 2021-04-08 NOTE — TELEPHONE ENCOUNTER
Patient said he has been off work the past few days because he just \"wasn't feelin it\". He is requesting a work excuse from Dr Cecilia Toribio beginning 04/06/2021 thru today.

## 2021-04-09 ENCOUNTER — OFFICE VISIT (OUTPATIENT)
Dept: INTERNAL MEDICINE CLINIC | Age: 46
End: 2021-04-09
Payer: COMMERCIAL

## 2021-04-09 VITALS
DIASTOLIC BLOOD PRESSURE: 70 MMHG | BODY MASS INDEX: 28.66 KG/M2 | SYSTOLIC BLOOD PRESSURE: 112 MMHG | WEIGHT: 248 LBS | HEART RATE: 65 BPM | OXYGEN SATURATION: 97 % | TEMPERATURE: 97 F

## 2021-04-09 DIAGNOSIS — R51.9 ACUTE NONINTRACTABLE HEADACHE, UNSPECIFIED HEADACHE TYPE: Primary | ICD-10-CM

## 2021-04-09 PROCEDURE — 99213 OFFICE O/P EST LOW 20 MIN: CPT | Performed by: INTERNAL MEDICINE

## 2021-04-09 PROCEDURE — 1036F TOBACCO NON-USER: CPT | Performed by: INTERNAL MEDICINE

## 2021-04-09 PROCEDURE — G8417 CALC BMI ABV UP PARAM F/U: HCPCS | Performed by: INTERNAL MEDICINE

## 2021-04-09 PROCEDURE — G8427 DOCREV CUR MEDS BY ELIG CLIN: HCPCS | Performed by: INTERNAL MEDICINE

## 2021-04-09 NOTE — LETTER
[address] [address]  Phone: [phone]  Fax: [phone]    Kevan Gifford MD        April 9, 2021     Patient: Lynne Benson   YOB: 1975   Date of Visit: 4/9/2021       To Whom It May Concern:    Tomy Perez was seen by me for headaches. He needed to miss work from 4/6- 4/9. His symptoms have stablized. It is my medical opinion that Tomy Perez should remain out of work until April 12, 2021. If you have any questions or concerns, please don't hesitate to call.     Sincerely,        Kevan Gifford MD

## 2021-04-09 NOTE — PROGRESS NOTES
Tomy Perez (:  1975) is a 54 y.o. male,Established patient, here for evaluation of the following chief complaint(s):  Letter for School/Work (Pt was off for 4 days. )      ASSESSMENT/PLAN:  1. Acute nonintractable headache, unspecified headache type  -  Wrote letter work  -  Start to use cpap  -  Take medication for restless    No follow-ups on file. SUBJECTIVE/OBJECTIVE:  HPI patient comes in for severe headaches. He has been taking some ibuprofen for   This. The headaces have improved but he did miss some work. Patient does have  A history of sleep apnea. He has not been using the cpap. Review of Systems  Vitals:    21 1603   BP: 112/70   Site: Left Upper Arm   Position: Sitting   Cuff Size: Large Adult   Pulse: 65   Temp: 97 °F (36.1 °C)   TempSrc: Infrared   SpO2: 97%   Weight: 248 lb (112.5 kg)      Wt Readings from Last 3 Encounters:   21 248 lb (112.5 kg)   21 250 lb (113.4 kg)   21 256 lb (116.1 kg)     BP Readings from Last 3 Encounters:   21 112/70   21 122/88   21 132/88     Body mass index is 28.66 kg/m². Facility age limit for growth percentiles is 20 years. Physical Exam  Constitutional:       General: He is not in acute distress. Appearance: Normal appearance. He is not ill-appearing. HENT:      Head: Normocephalic and atraumatic. Right Ear: Tympanic membrane and ear canal normal.      Left Ear: Tympanic membrane and ear canal normal.      Nose: Nose normal. No congestion or rhinorrhea. Mouth/Throat:      Mouth: Mucous membranes are dry. Pharynx: No oropharyngeal exudate or posterior oropharyngeal erythema. Eyes:      General:         Right eye: No discharge. Extraocular Movements: Extraocular movements intact. Pupils: Pupils are equal, round, and reactive to light. Neck:      Musculoskeletal: Normal range of motion. No neck rigidity or muscular tenderness.    Cardiovascular:      Rate and Rhythm: Normal rate and regular rhythm. Pulses: Normal pulses. Heart sounds: No murmur. No friction rub. Pulmonary:      Effort: Pulmonary effort is normal. No respiratory distress. Breath sounds: Normal breath sounds. No stridor. No wheezing. Neurological:      Mental Status: He is alert. An electronic signature was used to authenticate this note.     --Jonathan Wolf MD

## 2021-04-10 ENCOUNTER — IMMUNIZATION (OUTPATIENT)
Dept: FAMILY MEDICINE CLINIC | Age: 46
End: 2021-04-10
Payer: COMMERCIAL

## 2021-04-10 PROCEDURE — 91300 COVID-19, PFIZER VACCINE 30MCG/0.3ML DOSE: CPT | Performed by: FAMILY MEDICINE

## 2021-04-10 PROCEDURE — 0001A COVID-19, PFIZER VACCINE 30MCG/0.3ML DOSE: CPT | Performed by: FAMILY MEDICINE

## 2021-05-01 ENCOUNTER — IMMUNIZATION (OUTPATIENT)
Dept: FAMILY MEDICINE CLINIC | Age: 46
End: 2021-05-01
Payer: COMMERCIAL

## 2021-05-01 PROCEDURE — 0002A COVID-19, PFIZER VACCINE 30MCG/0.3ML DOSE: CPT | Performed by: FAMILY MEDICINE

## 2021-05-01 PROCEDURE — 91300 COVID-19, PFIZER VACCINE 30MCG/0.3ML DOSE: CPT | Performed by: FAMILY MEDICINE

## 2021-05-11 ENCOUNTER — TELEPHONE (OUTPATIENT)
Dept: INTERNAL MEDICINE CLINIC | Age: 46
End: 2021-05-11

## 2021-05-11 ENCOUNTER — OFFICE VISIT (OUTPATIENT)
Dept: PSYCHOLOGY | Age: 46
End: 2021-05-11
Payer: COMMERCIAL

## 2021-05-11 DIAGNOSIS — F33.1 MODERATE EPISODE OF RECURRENT MAJOR DEPRESSIVE DISORDER (HCC): Primary | ICD-10-CM

## 2021-05-11 DIAGNOSIS — F90.0 ATTENTION DEFICIT HYPERACTIVITY DISORDER (ADHD), PREDOMINANTLY INATTENTIVE TYPE: ICD-10-CM

## 2021-05-11 PROCEDURE — 1036F TOBACCO NON-USER: CPT | Performed by: PSYCHOLOGIST

## 2021-05-11 PROCEDURE — 90832 PSYTX W PT 30 MINUTES: CPT | Performed by: PSYCHOLOGIST

## 2021-05-11 NOTE — Clinical Note
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 He is doing better w/ managing his stress through boundary setting. He continues to struggle to manage his ADHD and stopped the rx d/t not seeing an improvement. He is worried about an upcoming test in 2 weeks and would like to restart as he is having difficulty maintaining focus for studying. Do you think a change or increase is warranted? He did lose his remaining scripts for his current dose.

## 2021-05-11 NOTE — TELEPHONE ENCOUNTER
Pt seen by Dr Lamont Mcguire today     Pt states she possible suggest change in adderall dose      [address]

## 2021-05-12 NOTE — PROGRESS NOTES
History     Socioeconomic History    Marital status: Single     Spouse name: Not on file    Number of children: 2    Years of education: Not on file    Highest education level: Not on file   Occupational History    Occupation: [email or web address]    Social Needs    Financial resource strain: Not on file    Food insecurity     Worry: Not on file     Inability: Not on file   [employer] needs     Medical: Not on file     Non-medical: Not on file   Tobacco Use    Smoking status: Former Smoker     Packs/day: 0.50     Years: 25.00     Pack years: 12.50     Types: Cigarettes     Start date: 1989     Quit date: 2015     Years since quittin.6    Smokeless tobacco: Never Used    Tobacco comment: started to smoke at 12 / smoked up to 1 to 1.5 p.p.d / quit smoking 2015   Substance and Sexual Activity    Alcohol use: Yes     Alcohol/week: 2.0 standard drinks     Types: 2 Cans of beer per week     Comment: socially    Drug use: No    Sexual activity: Yes     Partners: Female     Comment: 1 child   Lifestyle    Physical activity     Days per week: Not on file     Minutes per session: Not on file    Stress: Not on file   Relationships    Social connections     Talks on phone: Not on file     Gets together: Not on file     Attends Amish service: Not on file     Active member of club or organization: Not on file     Attends meetings of clubs or organizations: Not on file     Relationship status: Not on file    Intimate partner violence     Fear of current or ex partner: Not on file     Emotionally abused: Not on file     Physically abused: Not on file     Forced sexual activity: Not on file   Other Topics Concern    Not on file   Social History Narrative    Lives with mother-December 10, 2020      TOBACCO:   reports that he quit smoking about 5 years ago. His smoking use included cigarettes. He started smoking about 32 years ago. He has a 12.50 pack-year smoking history.  He has never used smokeless tobacco.  ETOH:   reports current alcohol use of about 2.0 standard drinks of alcohol per week. A:  Patient engaged and cooperative. denies SI. Insight and motivation are good. Diagnosis:    1. Moderate episode of recurrent major depressive disorder ([facility] [address]    2. Attention deficit hyperactivity disorder (ADHD), predominantly inattentive type          Plan:    There are no Patient Instructions on file for this visit. Pt interventions:    See above  No follow-ups on file. Documentation was done using voice recognition dragon software. Every effort was made to ensure accuracy; however, inadvertent, unintentional computerized transcription errors may be present.

## 2021-05-17 DIAGNOSIS — F90.0 ATTENTION DEFICIT HYPERACTIVITY DISORDER (ADHD), PREDOMINANTLY INATTENTIVE TYPE: ICD-10-CM

## 2021-05-17 NOTE — TELEPHONE ENCOUNTER
Pt is looking to have Adderall sent to [facility] on [address] [facility]. He discussed upping the dosage with Dr Aaron Freedman ad would like to  this medication as soon as possible. Please follow up at [phone]. Thanks!

## 2021-05-18 RX ORDER — DEXTROAMPHETAMINE SACCHARATE, AMPHETAMINE ASPARTATE MONOHYDRATE, DEXTROAMPHETAMINE SULFATE AND AMPHETAMINE SULFATE 3.75; 3.75; 3.75; 3.75 MG/1; MG/1; MG/1; MG/1
15 CAPSULE, EXTENDED RELEASE ORAL DAILY
Qty: 30 CAPSULE | Refills: 0 | Status: SHIPPED | OUTPATIENT
Start: 2021-05-18 | End: 2021-05-25 | Stop reason: SDUPTHER

## 2021-05-25 RX ORDER — DEXTROAMPHETAMINE SACCHARATE, AMPHETAMINE ASPARTATE MONOHYDRATE, DEXTROAMPHETAMINE SULFATE AND AMPHETAMINE SULFATE 3.75; 3.75; 3.75; 3.75 MG/1; MG/1; MG/1; MG/1
15 CAPSULE, EXTENDED RELEASE ORAL DAILY
Qty: 30 CAPSULE | Refills: 0 | Status: SHIPPED | OUTPATIENT
Start: 2021-05-25 | End: 2021-11-04 | Stop reason: SDUPTHER

## 2021-06-14 ENCOUNTER — VIRTUAL VISIT (OUTPATIENT)
Dept: PULMONOLOGY | Age: 46
End: 2021-06-14
Payer: COMMERCIAL

## 2021-06-14 DIAGNOSIS — G25.81 RLS (RESTLESS LEGS SYNDROME): Chronic | ICD-10-CM

## 2021-06-14 DIAGNOSIS — G47.33 OBSTRUCTIVE SLEEP APNEA SYNDROME: Chronic | ICD-10-CM

## 2021-06-14 DIAGNOSIS — F41.1 GAD (GENERALIZED ANXIETY DISORDER): Chronic | ICD-10-CM

## 2021-06-14 PROCEDURE — G8427 DOCREV CUR MEDS BY ELIG CLIN: HCPCS | Performed by: INTERNAL MEDICINE

## 2021-06-14 PROCEDURE — 99214 OFFICE O/P EST MOD 30 MIN: CPT | Performed by: INTERNAL MEDICINE

## 2021-06-14 ASSESSMENT — SLEEP AND FATIGUE QUESTIONNAIRES
HOW LIKELY ARE YOU TO NOD OFF OR FALL ASLEEP WHILE SITTING INACTIVE IN A PUBLIC PLACE: 0
HOW LIKELY ARE YOU TO NOD OFF OR FALL ASLEEP WHILE SITTING QUIETLY AFTER LUNCH WITHOUT ALCOHOL: 0
HOW LIKELY ARE YOU TO NOD OFF OR FALL ASLEEP WHILE SITTING AND READING: 0
HOW LIKELY ARE YOU TO NOD OFF OR FALL ASLEEP WHILE SITTING AND TALKING TO SOMEONE: 0
HOW LIKELY ARE YOU TO NOD OFF OR FALL ASLEEP WHILE WATCHING TV: 0
HOW LIKELY ARE YOU TO NOD OFF OR FALL ASLEEP WHEN YOU ARE A PASSENGER IN A CAR FOR AN HOUR WITHOUT A BREAK: 0
HOW LIKELY ARE YOU TO NOD OFF OR FALL ASLEEP IN A CAR, WHILE STOPPED FOR A FEW MINUTES IN TRAFFIC: 0
ESS TOTAL SCORE: 1
HOW LIKELY ARE YOU TO NOD OFF OR FALL ASLEEP WHILE LYING DOWN TO REST IN THE AFTERNOON WHEN CIRCUMSTANCES PERMIT: 1

## 2021-06-14 NOTE — PROGRESS NOTES
Tomy ELISE Chris         : 1975    Diagnosis: [x] VU (G47.33) [] CSA (G47.31) [] Apnea (G47.30)   Length of Need: [x] 13 Months [] 99 Months [] Other:    Machine (GINA!): [] Respironics Dream Station   2   Auto [x] ResMed AirSense     Auto [] Other:     []  CPAP () [x] Bilevel ()   Mode: [] Auto [] Spontaneous    Mode: [x] Auto [] Spontaneous       EPAPmin-6  IPAPmax-25  PS-4     Comfort Settings:   - Ramp Pressure: 5 cmH2O                                        - Ramp time: 15 min                                     -  Flex/EPR - 3 full time                                    - For ResMed Bilevel (TiMax-4 sec   TiMin- 0.2 sec)     Humidifier: [x] Heated ()        [x] Water chamber replacement ()/ 1 per 6 months        Mask:   [] Nasal () /1 per 3 months [x] Full Face () /1 per 3 months   [] Patient choice -Size and fit mask [x] Patient Choice - Size and fit mask   [] Dispense:  [x] Dispense:    [] Headgear () / 1 per 3 months [x] Headgear () / 1 per 3 months   [] Replacement Nasal Cushion ()/2 per month [x] Interface Replacement ()/1 per month   [] Replacement Nasal Pillows ()/2 per month         Tubing: [x] Heated ()/1 per 3 months    [] Standard ()/1 per 3 months [] Other:           Filters: [x] Non-disposable ()/1 per 6 months     [x] Ultra-Fine, Disposable ()/2 per month        Miscellaneous: [] Chin Strap ()/ 1 per 6 months [] O2 bleed-in:       LPM   [] Oximetry on CPAP/Bilevel []  Other:    [x] Modem: ()         Start Order Date: 21    MEDICAL JUSTIFICATION:  I, the undersigned, certify that the above prescribed supplies are medically necessary for this patients wellbeing. In my opinion, the supplies are both reasonable and necessary in reference to accepted standards of medicalpractice in treatment of this patients condition.     Linda Slaughter MD      NPI: [id number]       Order Signed Date: 21    Electronically signed by [address], MD on 2021 at 3:37 PM    Tomy L Chris  1975  [address] [address] [facility]  [phone] (home)   [phone] (mobile)      Insurance Info (confirm with patient if correct):  Payor/Plan Subscr  Sex Relation Sub. Ins. ID Effective Group Num   1. [employer] - * CHRIS,TOMY L 1975 Male  [id number] Not Eff [id number]                                   [address]   2.  [address] [facility] L 1975 Male  [id number] Not Eff [id number]                                   [address]

## 2021-06-14 NOTE — PROGRESS NOTES
Antonio Feng MD, Gwendolyn Meza, [facility]  Tiffanie Kehrt CNP  Reece Baas CNP [address]  [address] [facility], [address] [facility] [phone]   [facility] [address] [facility] [phone]     Video Visit- Follow up    Pursuant to the emergency declaration under the [address] [place] [address] waiver authority and the [employer] Act, this Virtual  Visit was conducted, with patient's consent, to reduce the patient's risk of exposure to COVID-19 and provide continuity of care for an established patient. Services were provided through a video synchronous discussion virtually to substitute for in-person clinic visit. Patient was located in their home. Assessment/Plan:      1. Obstructive sleep apnea syndrome  Assessment & Plan:  Chronic-with progression/exacerbation:  Continue medications per his PCP and other physicians. Instructed not to drive unless had 4 hrs of effective therapy for his VU the night before. Did review the risks of under or untreated VU including, but not limited to, higher risks of motor vehicle accidents, stroke, heart attacks, and death. He understands and accepts all these risks. Patient has failed CPAP and needs change to bilevel. We will order an auto bilevel trial (insurance mandated) and see the patient back in a week follow-up. Patient can try a full facemask if needed for his congestion. 2. RLS (restless legs syndrome)  Assessment & Plan:  Chronic- Stable. Discussed the importance of treating obstructive sleep apnea as part of the management of this disorder. While the patient continue Mirapex 0.25 mg since he is stable at that dose, gaining benefit, and having minimal side effects. We did discuss a possible trial of gabapentin to help both with sleep and RLS but the patient will to keep the Mirapex for now.   3. BIJAL (generalized anxiety disorder)  Assessment & Plan:  Chronic- Stable. Discussed the importance of treating obstructive sleep apnea as part of the management of this disorder. Cont any meds per PCP and other physicians. Reviewed, analyzed, and documented physiologic data from patient's PAP machine. This information was analyzed to assess complexity and medical decision making in regards to further testing and management. Diagnoses of Obstructive sleep apnea syndrome, RLS (restless legs syndrome), and BIJAL (generalized anxiety disorder) were pertinent to this visit. The chronic medical conditions listed are directly related to the primary diagnosis listed above. The management of the primary diagnosis affects the secondary diagnosis and vice versa. Subjective:     Patient ID: Seven Lowry is a 54 y.o. male. Chief Complaint   Patient presents with    Sleep Apnea     Subjective   HPI:    Machine Modem/Download Info:  Compliance (hours/night): 0.5 hrs/night  % of nights >= 4 hrs: 8.1 %  Download AHI (/hour): 8.9 /HR  Average CPAP Pressure : 7.7 cmH2O   APAP - Settings  Pressure Min: 6 cmH2O  Pressure Max: 16 cmH2O                 Comfort Settings  Humidity Level (0-8): 3  Flex/EPR (0-3): 3       Essentially very little use of his machine since his last visit. Get comfortable with his machine. He often feels like he is suffocating not enough air in but that has difficulty exhaling at the higher pressure will change to. He does have some congestion issues that is playing a role as well. Mirapex 0.25 mg is really helped with his legs and he is falling asleep a little quicker but struggles to fall asleep with his machine.     [address]    [place] - Total score: 1    Current Outpatient Medications   Medication Instructions    albuterol sulfate HFA (PROVENTIL HFA) 108 (90 Base) MCG/ACT inhaler 2 puffs, Inhalation, EVERY 4 HOURS PRN    amphetamine-dextroamphetamine (ADDERALL XR) 15 MG extended release capsule 15 mg,

## 2021-06-14 NOTE — ASSESSMENT & PLAN NOTE
Chronic- Stable. Discussed the importance of treating obstructive sleep apnea as part of the management of this disorder. While the patient continue Mirapex 0.25 mg since he is stable at that dose, gaining benefit, and having minimal side effects. We did discuss a possible trial of gabapentin to help both with sleep and RLS but the patient will to keep the Mirapex for now.

## 2021-06-14 NOTE — LETTER
[facility]  [address] [facility]  [address] [address]  Phone: [phone]  Fax: [phone]    Kenny Dotson MD    June 14, 2021     Marvel Rosado [facility] [address] [place] [address]    Patient: Rebekah Hoyos   MR Number: [id number]   YOB: 1975   Date of Visit: 6/14/2021       Dear Jhon Saunders: Thank you for referring Tomy Chris to me for evaluation/treatment. Below are the relevant portions of my assessment and plan of care. If you have questions, please do not hesitate to call me. I look forward to following Tomy along with you.     Sincerely,        Kenny Dotson MD

## 2021-08-07 ENCOUNTER — HOSPITAL ENCOUNTER (EMERGENCY)
Age: 46
Discharge: HOME | End: 2021-08-08
Attending: EMERGENCY MEDICINE
Payer: COMMERCIAL

## 2021-08-07 VITALS
RESPIRATION RATE: 14 BRPM | HEIGHT: 78 IN | TEMPERATURE: 98.2 F | OXYGEN SATURATION: 99 % | HEART RATE: 65 BPM | SYSTOLIC BLOOD PRESSURE: 135 MMHG | WEIGHT: 245 LBS | DIASTOLIC BLOOD PRESSURE: 78 MMHG | BODY MASS INDEX: 28.35 KG/M2

## 2021-08-07 DIAGNOSIS — S51.011A LACERATION OF RIGHT ELBOW, INITIAL ENCOUNTER: Primary | ICD-10-CM

## 2021-08-07 PROCEDURE — 99282 EMERGENCY DEPT VISIT SF MDM: CPT

## 2021-08-07 PROCEDURE — 12001 RPR S/N/AX/GEN/TRNK 2.5CM/<: CPT

## 2021-08-07 RX ORDER — LIDOCAINE HYDROCHLORIDE 20 MG/ML
5 INJECTION, SOLUTION INFILTRATION; PERINEURAL ONCE
Status: DISCONTINUED | OUTPATIENT
Start: 2021-08-07 | End: 2021-08-08 | Stop reason: HOSPADM

## 2021-08-07 ASSESSMENT — PAIN DESCRIPTION - DESCRIPTORS: DESCRIPTORS: SHARP

## 2021-08-07 ASSESSMENT — PAIN DESCRIPTION - PAIN TYPE: TYPE: ACUTE PAIN

## 2021-08-07 ASSESSMENT — PAIN SCALES - GENERAL: PAINLEVEL_OUTOF10: 4

## 2021-08-07 ASSESSMENT — PAIN DESCRIPTION - ORIENTATION: ORIENTATION: RIGHT

## 2021-08-07 ASSESSMENT — PAIN DESCRIPTION - LOCATION: LOCATION: ELBOW

## 2021-08-08 ASSESSMENT — PAIN SCALES - GENERAL: PAINLEVEL_OUTOF10: 0

## 2021-08-08 NOTE — ED PROVIDER NOTES
CHIEF COMPLAINT  Laceration (elbow)      HISTORY OF PRESENT ILLNESS  Tomy Perez  is a 51 y.o. male who presents to the ED at via walk-in complaining of right elbow laceration. He was moving an air conditioner unit when he scraped his elbow across a sharp piece of metal and sustained a V-shaped deep laceration just distal to the olecranon on the dorsal aspect of his elbow. He is on Eliquis for prior DVTs;  he is up-to-date on his tetanus immunization. There is no bony tenderness. There are no other complaints, modifying factors or associated symptoms. Nursing notes reviewed. Past medical history:  has a past medical history of Asthma, Collar bone fracture, BIJAL (generalized anxiety disorder), Migraine, Moderate episode of recurrent major depressive disorder ([facility] [address] (05/01/2018), Obstructive sleep apnea syndrome (4/6/2021), Pulmonary emboli ([facility] [address] and Sleep apnea (2021). Past surgical history:  has a past surgical history that includes hernia repair (Right, 2009); Forearm surgery (Left, 2000); Appendectomy (2019); and Colonoscopy (N/A, 3/8/2021). Home medications:   Prior to Admission medications    Medication Sig Start Date End Date Taking? Authorizing Provider   amphetamine-dextroamphetamine (ADDERALL XR) 15 MG extended release capsule Take 1 capsule by mouth daily for 30 days.  5/25/21 6/24/21  Meghan Leahy MD   pramipexole (MIRAPEX) 0.25 MG tablet 1/2-3 po qHS, start 1/2 pill qHS, can increase by 1/2 pill every 7 nights if still not sleeping to a max of 3 pills po qHS 4/6/21   Jm Hernandez MD   citalopram (CELEXA) 20 MG tablet Take 1 tablet by mouth daily 2/22/21   Meghan Leahy MD   apixaban (ELIQUIS) 5 MG TABS tablet TAKE 1 TABLET BY MOUTH TWICE A DAY 2/15/21   Meghan Leahy MD   budesonide-formoterol [facility]) 160-4.5 MCG/ACT AERO Inhale 2 puffs into the lungs 2 times daily 1/6/21   Tangela Thacker MD   albuterol sulfate HFA (PROVENTIL HFA) 108 (90 Base) MCG/ACT inhaler Inhale 2 puffs into the lungs every 4 hours as needed for Wheezing or Shortness of Breath 1/6/21 1/6/22  Bess Moore MD   valACYclovir (VALTREX) 500 MG tablet Take 1 tablet by mouth daily 12/10/20   Jose Guadalupe Deleon MD       No Known Allergies    Social history:  reports that he quit smoking about 5 years ago. His smoking use included cigarettes. He started smoking about 32 years ago. He has a 12.50 pack-year smoking history. He has never used smokeless tobacco. He reports current alcohol use of about 2.0 standard drinks of alcohol per week. He reports that he does not use drugs. Family history:    Family History   Problem Relation Age of Onset    Diabetes Mother     Diabetes Sister     Clotting Disorder Maternal Grandfather     Diabetes Sister     Clotting Disorder Father        REVIEW OF SYSTEMS  6 systems reviewed, pertinent positives per HPI otherwise noted to be negative    PHYSICAL EXAM  Vitals:    08/07/21 2242   BP: 135/78   Pulse: 65   Resp: 14   Temp: 98.2 °F (36.8 °C)   SpO2: 99%       GENERAL: Patient is well-developed, well-nourished,  no acute distress. no apparent discomfort. Non toxic appearing. HEENT:  Normocephalic, atraumatic. PERRL. Conjunctiva appear normal.  External ears are normal.  MMM  NECK: Supple with normal ROM. Trachea midline  LUNGS:  Normal work of breathing. Speaking comfortably in full sentences. EXTREMITIES: 2+ distal pulses w/o edema. MUSCULOSKELETAL:  Atraumatic extremities with normal ROM grossly. No obvious bony deformities. 3 x 3 V shaped laceration to the dorsal aspect of his left elbow just distal to the olecranon. SKIN: Warm/dry. No rashes/lesions noted. PSYCHIATRIC: Patient is alert and oriented with normal affect  NEUROLOGIC: Cranial nerves grossly intact. Moves all extremities with equal strength. No gross sensory deficits. Answers questions/follows commands appropriately. ED COURSE/MDM  Nursing notes reviewed.   Pt was given the following medications or treatments in the ED: suture repair of skin-flap laceration      PROCEDURE:  LACERATION REPAIR  Tomy Perez or their surrogate had an opportunity to ask questions, and the risks, benefits, and alternatives were discussed. The wound was prepped and draped to maintain a sterile field. A local anesthetic was used to completely anesthetize the wound. It was copiously irrigated. It was explored to its depth in a bloodless field with no sign of tendon, nerve, or vascular injury. No foreign bodies were identified. It was closed with 4-0 Ethilon x7  there were no complications during the procedure. Clinical Impression  Based on the presenting complaint, history, and physical exam, multiple diagnoses were considered. Exam and workup here most c/w:  1. Laceration of right elbow, initial encounter        I discussed with Tomy Perez the results of evaluation in the ED, diagnosis, care, and prognosis. The plan is to discharge to home. Patient is in agreement with plan and questions have been answered. I also discussed with Tomy L Chris the reasons which may require a return visit and the importance of follow-up care. The patient is well-appearing, nontoxic, and improved at the time of discharge. Patient agrees to call to arrange follow-up care as directed. Tomy Perez understands to return immediately for worsening/change in symptoms. Patient will be started on the following medications from the ED:  Discharge Medication List as of 8/8/2021 12:03 AM            Disposition  Pt is discharged in stable condition.     Disposition Vitals:  /78   Pulse 65   Temp 98.2 °F (36.8 °C) (Oral)   Resp 14   Ht 6' 6\" (1.981 m)   Wt 245 lb (111.1 kg)   SpO2 99%   BMI 28.31 kg/m²                   Dana Blackwell MD  08/08/21 [address] Dianna Miles MD  08/08/21 4843

## 2021-08-12 ENCOUNTER — VIRTUAL VISIT (OUTPATIENT)
Dept: PULMONOLOGY | Age: 46
End: 2021-08-12
Payer: COMMERCIAL

## 2021-08-12 DIAGNOSIS — G25.81 RLS (RESTLESS LEGS SYNDROME): Chronic | ICD-10-CM

## 2021-08-12 DIAGNOSIS — G47.33 OBSTRUCTIVE SLEEP APNEA SYNDROME: Chronic | ICD-10-CM

## 2021-08-12 DIAGNOSIS — F41.1 GAD (GENERALIZED ANXIETY DISORDER): Chronic | ICD-10-CM

## 2021-08-12 PROCEDURE — 99214 OFFICE O/P EST MOD 30 MIN: CPT | Performed by: INTERNAL MEDICINE

## 2021-08-12 PROCEDURE — G8427 DOCREV CUR MEDS BY ELIG CLIN: HCPCS | Performed by: INTERNAL MEDICINE

## 2021-08-12 ASSESSMENT — SLEEP AND FATIGUE QUESTIONNAIRES
HOW LIKELY ARE YOU TO NOD OFF OR FALL ASLEEP WHILE LYING DOWN TO REST IN THE AFTERNOON WHEN CIRCUMSTANCES PERMIT: 1
HOW LIKELY ARE YOU TO NOD OFF OR FALL ASLEEP WHILE SITTING AND READING: 0
HOW LIKELY ARE YOU TO NOD OFF OR FALL ASLEEP IN A CAR, WHILE STOPPED FOR A FEW MINUTES IN TRAFFIC: 0
ESS TOTAL SCORE: 1
HOW LIKELY ARE YOU TO NOD OFF OR FALL ASLEEP WHILE WATCHING TV: 0
HOW LIKELY ARE YOU TO NOD OFF OR FALL ASLEEP WHILE SITTING INACTIVE IN A PUBLIC PLACE: 0
HOW LIKELY ARE YOU TO NOD OFF OR FALL ASLEEP WHILE SITTING QUIETLY AFTER LUNCH WITHOUT ALCOHOL: 0
HOW LIKELY ARE YOU TO NOD OFF OR FALL ASLEEP WHILE SITTING AND TALKING TO SOMEONE: 0
HOW LIKELY ARE YOU TO NOD OFF OR FALL ASLEEP WHEN YOU ARE A PASSENGER IN A CAR FOR AN HOUR WITHOUT A BREAK: 0

## 2021-08-12 NOTE — PROGRESS NOTES
Pedrito Reyna MD, Dayami Ron, [facility]  Tiffanie Kehrt CNP  Bhaskar Jones CNP [address]  Indira Ying [address], [address] [facility]- [phone]   [facility] [address] Indira Ying. [address] [place] [address]. [phone]     Video Visit- Follow up    Pursuant to the emergency declaration under the [address] [place] [address] authority and the [employer] Act, this Virtual  Visit was conducted, with patient's consent, to reduce the patient's risk of exposure to COVID-19 and provide continuity of care for an established patient. Services were provided through a video synchronous discussion virtually to substitute for in-person clinic visit. Patient was located in their home. Assessment/Plan:      1. Obstructive sleep apnea syndrome  Assessment & Plan:  Chronic-with progression/exacerbation:  Continue medications per his PCP and other physicians. Instructed not to drive unless had 4 hrs of effective therapy for his VU the night before. Did review the risks of under or untreated VU including, but not limited to, higher risks of motor vehicle accidents, stroke, heart attacks, and death. He understands and accepts all these risks. Encouraged the patient to make an effort to really try using his machine consistently for least 3 to 4 weeks before we say he is failed and consider an oral appliance. 2. RLS (restless legs syndrome)  Assessment & Plan:  Chronic- Stable. Discussed the importance of treating obstructive sleep apnea as part of the management of this disorder. While the patient continue Mirapex 0.25-0.5 mg since he is stable at that dose, gaining benefit, and having minimal side effects. 3. BIJAL (generalized anxiety disorder)  Assessment & Plan:  Chronic- Stable. Discussed the importance of treating obstructive sleep apnea as part of the management of this disorder. Cont any meds per PCP and other physicians. Reviewed, analyzed, and documented physiologic data from patient's PAP machine. This information was analyzed to assess complexity and medical decision making in regards to further testing and management. Diagnoses of Obstructive sleep apnea syndrome, RLS (restless legs syndrome), and BIJAL (generalized anxiety disorder) were pertinent to this visit. The chronic medical conditions listed are directly related to the primary diagnosis listed above. The management of the primary diagnosis affects the secondary diagnosis and vice versa. Subjective:     Patient ID: Dayna Gotti is a 51 y.o. male. Chief Complaint   Patient presents with    Sleep Apnea     Subjective   HPI:    Machine Modem/Download Info:  Compliance (hours/night): 0.03 hrs/night  % of nights >= 4 hrs: 0 %  Download AHI (/hour): 9.9 /HR  Average IPAP Pressure: 10.6 cmH2O  Average EPAP Pressure: 6.7 cmH2O   AUTO BILEVEL - Settings (ResMed)  IPAP Max: 25 cmH2O  EPAP Min: 6 cmH2O  Pressure Support: 4               Struggling to use his bilevel machine but he admits he only tried it for a few days. Download confirms he only tried it for 4 days. He feels the Mirapex is really help with his leg symptoms he is taking anywhere from 1 to 1-1/2 tablets of 0.25 mg pill. He is sleeping better with the Mirapex but still waking at night but can go back to bed most nights.     [address]    [place] - Total score: 1    Current Outpatient Medications   Medication Instructions    albuterol sulfate HFA (PROVENTIL HFA) 108 (90 Base) MCG/ACT inhaler 2 puffs, Inhalation, EVERY 4 HOURS PRN    amphetamine-dextroamphetamine (ADDERALL XR) 15 MG extended release capsule 15 mg, Oral, DAILY    apixaban (ELIQUIS) 5 MG TABS tablet TAKE 1 TABLET BY MOUTH TWICE A DAY    budesonide-formoterol (SYMBICORT) 160-4.5 MCG/ACT AERO 2 puffs, Inhalation, 2 TIMES DAILY    citalopram (CELEXA) 20 mg, Oral, DAILY    pramipexole (MIRAPEX) 0.25 MG tablet 1/2-3 po qHS, start 1/2 pill qHS, can increase by 1/2 pill every 7 nights if still not sleeping to a max of 3 pills po qHS    valACYclovir (VALTREX) 500 mg, Oral, DAILY        Electronically signed by Cora Weiss MD on 8/12/2021 at 4:13 PM

## 2021-08-12 NOTE — ASSESSMENT & PLAN NOTE
Chronic-with progression/exacerbation:  Continue medications per his PCP and other physicians. Instructed not to drive unless had 4 hrs of effective therapy for his VU the night before. Did review the risks of under or untreated VU including, but not limited to, higher risks of motor vehicle accidents, stroke, heart attacks, and death. He understands and accepts all these risks. Encouraged the patient to make an effort to really try using his machine consistently for least 3 to 4 weeks before we say he is failed and consider an oral appliance.

## 2021-08-12 NOTE — LETTER
[facility]  [place] [address] [facility] [address]  Phone: [phone]  Fax: [phone]    Marielena Whitehead MD    August 12, 2021     Tono Cabrera, [address] [facility] [address]    Patient: Rl Dailey   MR Number: [id number]   YOB: 1975   Date of Visit: 8/12/2021       Dear Tono Cabrera: Thank you for referring Tomy Perez to me for evaluation/treatment. Below are the relevant portions of my assessment and plan of care. 1. Obstructive sleep apnea syndrome  Assessment & Plan:  Chronic-with progression/exacerbation:  Continue medications per his PCP and other physicians. Instructed not to drive unless had 4 hrs of effective therapy for his VU the night before. Did review the risks of under or untreated VU including, but not limited to, higher risks of motor vehicle accidents, stroke, heart attacks, and death. He understands and accepts all these risks. Encouraged the patient to make an effort to really try using his machine consistently for least 3 to 4 weeks before we say he is failed and consider an oral appliance. 2. RLS (restless legs syndrome)  Assessment & Plan:  Chronic- Stable. Discussed the importance of treating obstructive sleep apnea as part of the management of this disorder. While the patient continue Mirapex 0.25-0.5 mg since he is stable at that dose, gaining benefit, and having minimal side effects. 3. BIJAL (generalized anxiety disorder)  Assessment & Plan:  Chronic- Stable. Discussed the importance of treating obstructive sleep apnea as part of the management of this disorder. Cont any meds per PCP and other physicians. Reviewed, analyzed, and documented physiologic data from patient's PAP machine. This information was analyzed to assess complexity and medical decision making in regards to further testing and management.     Diagnoses of Obstructive sleep apnea syndrome, RLS (restless

## 2021-08-12 NOTE — ASSESSMENT & PLAN NOTE
Chronic- Stable. Discussed the importance of treating obstructive sleep apnea as part of the management of this disorder. While the patient continue Mirapex 0.25-0.5 mg since he is stable at that dose, gaining benefit, and having minimal side effects.

## 2021-11-04 ENCOUNTER — TELEMEDICINE (OUTPATIENT)
Dept: INTERNAL MEDICINE CLINIC | Age: 46
End: 2021-11-04
Payer: COMMERCIAL

## 2021-11-04 DIAGNOSIS — F90.0 ATTENTION DEFICIT HYPERACTIVITY DISORDER (ADHD), PREDOMINANTLY INATTENTIVE TYPE: ICD-10-CM

## 2021-11-04 DIAGNOSIS — A64 STI (SEXUALLY TRANSMITTED INFECTION): Primary | ICD-10-CM

## 2021-11-04 DIAGNOSIS — F32.1 MODERATE SINGLE CURRENT EPISODE OF MAJOR DEPRESSIVE DISORDER (HCC): ICD-10-CM

## 2021-11-04 DIAGNOSIS — B00.9 HSV INFECTION: ICD-10-CM

## 2021-11-04 PROCEDURE — 99443 PR PHYS/QHP TELEPHONE EVALUATION 21-30 MIN: CPT | Performed by: INTERNAL MEDICINE

## 2021-11-04 RX ORDER — DOXYCYCLINE HYCLATE 100 MG/1
CAPSULE ORAL
COMMUNITY
Start: 2021-10-05 | End: 2022-03-21 | Stop reason: ALTCHOICE

## 2021-11-04 RX ORDER — CITALOPRAM 20 MG/1
20 TABLET ORAL DAILY
Qty: 90 TABLET | Refills: 2 | Status: SHIPPED | OUTPATIENT
Start: 2021-11-04 | End: 2022-04-08

## 2021-11-04 RX ORDER — DEXTROAMPHETAMINE SACCHARATE, AMPHETAMINE ASPARTATE MONOHYDRATE, DEXTROAMPHETAMINE SULFATE AND AMPHETAMINE SULFATE 3.75; 3.75; 3.75; 3.75 MG/1; MG/1; MG/1; MG/1
15 CAPSULE, EXTENDED RELEASE ORAL DAILY
Qty: 30 CAPSULE | Refills: 0 | Status: SHIPPED | OUTPATIENT
Start: 2021-12-04 | End: 2022-09-16 | Stop reason: SDUPTHER

## 2021-11-04 RX ORDER — DEXTROAMPHETAMINE SACCHARATE, AMPHETAMINE ASPARTATE MONOHYDRATE, DEXTROAMPHETAMINE SULFATE AND AMPHETAMINE SULFATE 3.75; 3.75; 3.75; 3.75 MG/1; MG/1; MG/1; MG/1
15 CAPSULE, EXTENDED RELEASE ORAL DAILY
Qty: 30 CAPSULE | Refills: 0 | Status: SHIPPED | OUTPATIENT
Start: 2021-11-04 | End: 2022-04-19 | Stop reason: SDUPTHER

## 2021-11-04 RX ORDER — VALACYCLOVIR HYDROCHLORIDE 500 MG/1
500 TABLET, FILM COATED ORAL DAILY
Qty: 30 TABLET | Refills: 5 | Status: SHIPPED | OUTPATIENT
Start: 2021-11-04 | End: 2022-09-16 | Stop reason: SDUPTHER

## 2021-11-04 RX ORDER — DEXTROAMPHETAMINE SACCHARATE, AMPHETAMINE ASPARTATE MONOHYDRATE, DEXTROAMPHETAMINE SULFATE AND AMPHETAMINE SULFATE 3.75; 3.75; 3.75; 3.75 MG/1; MG/1; MG/1; MG/1
15 CAPSULE, EXTENDED RELEASE ORAL DAILY
Qty: 30 CAPSULE | Refills: 0 | Status: SHIPPED | OUTPATIENT
Start: 2022-01-04 | End: 2022-01-12 | Stop reason: CLARIF

## 2021-11-04 NOTE — PROGRESS NOTES
Tomy Mayfield is a 51 y.o. male evaluated via telephone on 11/4/2021. Consent:  He and/or health care decision maker is aware that that he may receive a bill for this telephone service, depending on his insurance coverage, and has provided verbal consent to proceed: Yes      Documentation:  I communicated with the patient and/or health care decision maker about STI  Details of this discussion including any medical advice provided: patient is sexually active with one partner, but is having some dysuria. He has been treated in the past. He would like some testing done    I affirm this is a Patient Initiated Episode with a Patient who has not had a related appointment within my department in the past 7 days or scheduled within the next 24 hours. Patient identification was verified at the start of the visit: Yes    Total Time: minutes: 21-30 minutes    The visit was conducted pursuant to the emergency declaration under the [address] [place] [address], [address] [place] [address] authority and the [employer] General Act. Patient identification was verified, and a caregiver was present when appropriate. The patient was located in a state where the provider was credentialed to provide care.     Note: not billable if this call serves to triage the patient into an appointment for the relevant concern      Stefano Parrish MD

## 2021-11-05 DIAGNOSIS — A64 STI (SEXUALLY TRANSMITTED INFECTION): ICD-10-CM

## 2021-11-05 LAB
SPECIMEN TYPE: NORMAL
TRICHOMONAS VAGINALIS SCREEN: NEGATIVE

## 2021-11-06 LAB
HIV AG/AB: NORMAL
HIV ANTIGEN: NORMAL
HIV-1 ANTIBODY: NORMAL
HIV-2 AB: NORMAL
TOTAL SYPHILLIS IGG/IGM: NORMAL

## 2021-11-07 LAB
C. TRACHOMATIS DNA ,URINE: NEGATIVE
N. GONORRHOEAE DNA, URINE: NEGATIVE

## 2021-11-08 ENCOUNTER — TELEPHONE (OUTPATIENT)
Dept: ADMINISTRATIVE | Age: 46
End: 2021-11-08

## 2021-11-08 DIAGNOSIS — G25.81 RLS (RESTLESS LEGS SYNDROME): ICD-10-CM

## 2021-11-08 NOTE — TELEPHONE ENCOUNTER
Patient need refill call in at [facility] by Dr Rashmi Reyna for the pramipexole (MIRAPEX) 0.25 MG tablet  Patient only have 1 day left of medication.     If any questions please call [phone]    Thanks

## 2021-11-09 ENCOUNTER — TELEPHONE (OUTPATIENT)
Dept: PULMONOLOGY | Age: 46
End: 2021-11-09

## 2021-11-09 DIAGNOSIS — G25.81 RLS (RESTLESS LEGS SYNDROME): ICD-10-CM

## 2021-11-09 RX ORDER — PRAMIPEXOLE DIHYDROCHLORIDE 0.25 MG/1
TABLET ORAL
Qty: 90 TABLET | Refills: 2 | Status: SHIPPED | OUTPATIENT
Start: 2021-11-09 | End: 2021-12-15 | Stop reason: SDUPTHER

## 2021-11-09 RX ORDER — PRAMIPEXOLE DIHYDROCHLORIDE 0.25 MG/1
TABLET ORAL
Qty: 90 TABLET | Refills: 2 | Status: CANCELLED | OUTPATIENT
Start: 2021-11-09

## 2021-11-09 NOTE — TELEPHONE ENCOUNTER
Patient is requesting a refill of their prescription. Requested Prescriptions     Pending Prescriptions Disp Refills    pramipexole (MIRAPEX) 0.25 MG tablet 90 tablet 2     Si/2-3 po qHS, start 1/2 pill qHS, can increase by 1/2 pill every 7 nights if still not sleeping to a max of 3 pills po qHS        Recent Visits  Date Type Provider Dept   21 Office Visit Priyanka Rosenthal MD [facility]   21 Office Visit Priyanka Rosenthal MD [facility]   12/10/20 Office Visit Esteban Campbell MD [facility]   Showing recent visits within past 540 days with a meds authorizing provider and meeting all other requirements  Future Appointments  No visits were found meeting these conditions.   Showing future appointments within next 150 days with a meds authorizing provider and meeting all other requirements     Visit date not found

## 2021-11-09 NOTE — TELEPHONE ENCOUNTER
This medication was prescribed by Dr. Tanisha Lebron. He should reach out to his office to get the refill.

## 2021-12-15 ENCOUNTER — TELEPHONE (OUTPATIENT)
Dept: PULMONOLOGY | Age: 46
End: 2021-12-15

## 2021-12-15 DIAGNOSIS — G25.81 RLS (RESTLESS LEGS SYNDROME): ICD-10-CM

## 2021-12-15 RX ORDER — PRAMIPEXOLE DIHYDROCHLORIDE 0.25 MG/1
TABLET ORAL
Qty: 180 TABLET | Refills: 1 | Status: SHIPPED | OUTPATIENT
Start: 2021-12-15 | End: 2022-04-07

## 2021-12-15 NOTE — TELEPHONE ENCOUNTER
Patient left a message stating that his insurance won't fill a 30 day script on his Pramepexole. They want it done as a 90 day. Patient uses [facility] in [place] on [address]     LOV 08/12/2021  Next OV not scheduled  Last refill 11/09/2021 for 30 days with 2 refills  Download in [facility] compliant    [phone]

## 2022-01-07 ENCOUNTER — TELEPHONE (OUTPATIENT)
Dept: INTERNAL MEDICINE CLINIC | Age: 47
End: 2022-01-07

## 2022-01-07 NOTE — LETTER
[address] [facility]  [address] [facility] [address] [address]  Phone: [phone]  Fax: [phone]    Erica Eastman MD        January 11, 2022     Patient: Christo Quintanilla   YOB: 1975   Date of Visit: 1/7/2022       To Whom It May Concern:    Tomy Perez is under my care for covid-19. He was diagnosed on 12/26/21. He continues to have some shortness of breath. It is my medical opinion that Linda Méndez should remain out of work until 1/14/2022. If you have any questions or concerns, please don't hesitate to call.     Sincerely,        Zaid De Los Santos MD, MSc, [address], [address] [place] [address] Internal Medicine-Pediatrics  [address] [place] [address] 62.

## 2022-01-11 NOTE — TELEPHONE ENCOUNTER
Symptoms started 12/24/1, tested 12/26/21, however he states hes had chest pain ever since and some difficulty breathing.  Sees Lavender tomorrow for covid f/u

## 2022-01-12 ENCOUNTER — TELEMEDICINE (OUTPATIENT)
Dept: INTERNAL MEDICINE CLINIC | Age: 47
End: 2022-01-12
Payer: COMMERCIAL

## 2022-01-12 DIAGNOSIS — G93.31 POST VIRAL SYNDROME: Primary | ICD-10-CM

## 2022-01-12 PROCEDURE — G8427 DOCREV CUR MEDS BY ELIG CLIN: HCPCS | Performed by: NURSE PRACTITIONER

## 2022-01-12 PROCEDURE — 99213 OFFICE O/P EST LOW 20 MIN: CPT | Performed by: NURSE PRACTITIONER

## 2022-01-12 SDOH — ECONOMIC STABILITY: FOOD INSECURITY: WITHIN THE PAST 12 MONTHS, THE FOOD YOU BOUGHT JUST DIDN'T LAST AND YOU DIDN'T HAVE MONEY TO GET MORE.: NEVER TRUE

## 2022-01-12 SDOH — ECONOMIC STABILITY: FOOD INSECURITY: WITHIN THE PAST 12 MONTHS, YOU WORRIED THAT YOUR FOOD WOULD RUN OUT BEFORE YOU GOT MONEY TO BUY MORE.: NEVER TRUE

## 2022-01-12 ASSESSMENT — PATIENT HEALTH QUESTIONNAIRE - PHQ9
SUM OF ALL RESPONSES TO PHQ QUESTIONS 1-9: 0
1. LITTLE INTEREST OR PLEASURE IN DOING THINGS: 0
SUM OF ALL RESPONSES TO PHQ QUESTIONS 1-9: 0
2. FEELING DOWN, DEPRESSED OR HOPELESS: 0
SUM OF ALL RESPONSES TO PHQ9 QUESTIONS 1 & 2: 0
SUM OF ALL RESPONSES TO PHQ QUESTIONS 1-9: 0
SUM OF ALL RESPONSES TO PHQ QUESTIONS 1-9: 0

## 2022-01-12 ASSESSMENT — ENCOUNTER SYMPTOMS
SHORTNESS OF BREATH: 1
GASTROINTESTINAL NEGATIVE: 1
EYES NEGATIVE: 1

## 2022-01-12 ASSESSMENT — SOCIAL DETERMINANTS OF HEALTH (SDOH): HOW HARD IS IT FOR YOU TO PAY FOR THE VERY BASICS LIKE FOOD, HOUSING, MEDICAL CARE, AND HEATING?: NOT HARD AT ALL

## 2022-01-12 NOTE — PROGRESS NOTES
2022    TELEHEALTH EVALUATION -- Audio/Visual (During COBXN-10 public health emergency)    HPI: Presents today for post viral syndrome, COVID 21, as of today, continues with fatigue, lightheadedness,headache, and chest pain      Tomy Perez (:  1975) has requested an audio/video evaluation for the following concern(s):    Post viral syndrome    Review of Systems   Constitutional: Positive for fatigue. HENT: Negative. Eyes: Negative. Respiratory: Positive for shortness of breath. Cardiovascular: Positive for chest pain. Gastrointestinal: Negative. Endocrine: Negative. Musculoskeletal: Negative. Neurological: Positive for light-headedness and headaches. Hematological: Negative. Psychiatric/Behavioral: Negative. Prior to Visit Medications    Medication Sig Taking? Authorizing Provider   pramipexole (MIRAPEX) 0.25 MG tablet 1-2 po qHS Yes Chad Cabrera MD   amphetamine-dextroamphetamine (ADDERALL XR) 15 MG extended release capsule Take 1 capsule by mouth daily for 30 days. Yes Everett Hoffman MD   valACYclovir (VALTREX) 500 MG tablet Take 1 tablet by mouth daily Yes Everett Hoffman MD   apixaban (ELIQUIS) 5 MG TABS tablet TAKE 1 TABLET BY MOUTH TWICE A DAY Yes Everett Hoffman MD   citalopram (CELEXA) 20 MG tablet Take 1 tablet by mouth daily Yes Everett Hoffman MD   budesonide-formoterol (SYMBICORT) 160-4.5 MCG/ACT AERO Inhale 2 puffs into the lungs 2 times daily Yes Ed Stevens MD   albuterol sulfate HFA (PROVENTIL HFA) 108 (90 Base) MCG/ACT inhaler Inhale 2 puffs into the lungs every 4 hours as needed for Wheezing or Shortness of Breath Yes Ed Stevens MD   doxycycline hyclate (VIBRAMYCIN) 100 MG capsule TAKE 1 CAPSULE BY MOUTH TWICE DAILY  Patient not taking: Reported on 2022  Historical Provider, MD   amphetamine-dextroamphetamine (ADDERALL XR) 15 MG extended release capsule Take 1 capsule by mouth daily for 30 days.   Rolando Santos Sanket Coto MD       Social History     Tobacco Use    Smoking status: Former Smoker     Packs/day: 0.50     Years: 25.00     Pack years: 12.50     Types: Cigarettes     Start date: 1989     Quit date: 2015     Years since quittin.3    Smokeless tobacco: Never Used    Tobacco comment: started to smoke at 12 / smoked up to 1 to 1.5 p.p.d / quit smoking 2015   Vaping Use    Vaping Use: Former    Substances: Always   Substance Use Topics    Alcohol use: Yes     Alcohol/week: 2.0 standard drinks     Types: 2 Cans of beer per week     Comment: socially    Drug use: No        No Known Allergies,   Past Medical History:   Diagnosis Date    Asthma     since P.E.    Collar bone fracture     BIJAL (generalized anxiety disorder)     Migraine     [facility] of [facility]    Obstructive sleep apnea syndrome 2021    Pulmonary emboli ([facility]     2018    Sleep apnea     just dx'ed; getting CPAP   ,   Family History   Problem Relation Age of Onset    Diabetes Mother     Diabetes Sister     Clotting Disorder Maternal Grandfather     Diabetes Sister     Clotting Disorder Father        PHYSICAL EXAMINATION:  [ INSTRUCTIONS:  \"[x]\" Indicates a positive item  \"[]\" Indicates a negative item  -- DELETE ALL ITEMS NOT EXAMINED]  Vital Signs: (As obtained by patient/caregiver or practitioner observation)    Blood pressure-  Heart rate-    Respiratory rate-    Temperature-  Pulse oximetry-     Constitutional: [] Appears well-developed and well-nourished [] No apparent distress      [] Abnormal-   Mental status  [x] Alert and awake  [x] Oriented to person/place/time []Able to follow commands      Eyes:  EOM    []  Normal  [] Abnormal-  Sclera  []  Normal  [] Abnormal -         Discharge []  None visible  [] Abnormal -    HENT:   [] Normocephalic, atraumatic.   [] Abnormal   [] Mouth/Throat: Mucous membranes are moist.     External Ears [] Normal  [] Abnormal-     Neck: [x] No visualized mass     Pulmonary/Chest: [x] Respiratory effort normal.  [x] No visualized signs of difficulty breathing or respiratory distress        [] Abnormal-      Musculoskeletal:   [] Normal gait with no signs of ataxia         [] Normal range of motion of neck        [] Abnormal-       Neurological:        [] No Facial Asymmetry (Cranial nerve 7 motor function) (limited exam to video visit)          [] No gaze palsy        [] Abnormal-         Skin:        [x] No significant exanthematous lesions or discoloration noted on facial skin         [] Abnormal-            Psychiatric:       [] Normal Affect [] No Hallucinations        [x] Abnormal-depressed and impatient     Other pertinent observable physical exam findings-     ASSESSMENT/PLAN:  Tomy was seen today for post-covid symptoms. Diagnoses and all orders for this visit:    Post viral syndrome    -tylenol daily for chest pain  -Increase water intake  -Increase steps daily  -Symptoms may last indefinitely, will not be able to work Due lines with dizzyness        Tomy Perez, was evaluated through a synchronous (real-time) audio-video encounter. The patient (or guardian if applicable) is aware that this is a billable service. Verbal consent to proceed has been obtained within the past 12 months. The visit was conducted pursuant to the emergency declaration under the [address] [place] [address] and the [employer] Act. Patient identification was verified, and a caregiver was present when appropriate. The patient was located in a state where the provider was credentialed to provide care. Total time spent on this encounter: Not billed by time    --RUPERT Loco CNP on 1/12/2022 at 4:04 PM    An electronic signature was used to authenticate this note.

## 2022-01-23 ENCOUNTER — TELEPHONE (OUTPATIENT)
Dept: INTERNAL MEDICINE CLINIC | Age: 47
End: 2022-01-23

## 2022-01-23 NOTE — TELEPHONE ENCOUNTER
We received disability paperwork from [address] [place] [address]. The paperwork was completed and placed in Seamus's bin.  Please scan and fax back to [facility].

## 2022-02-16 ENCOUNTER — OFFICE VISIT (OUTPATIENT)
Dept: INTERNAL MEDICINE CLINIC | Age: 47
End: 2022-02-16
Payer: COMMERCIAL

## 2022-02-16 VITALS
WEIGHT: 243 LBS | HEART RATE: 66 BPM | OXYGEN SATURATION: 98 % | DIASTOLIC BLOOD PRESSURE: 80 MMHG | HEIGHT: 78 IN | SYSTOLIC BLOOD PRESSURE: 130 MMHG | TEMPERATURE: 97.6 F | BODY MASS INDEX: 28.11 KG/M2

## 2022-02-16 DIAGNOSIS — U09.9 COVID-19 LONG HAULER: ICD-10-CM

## 2022-02-16 DIAGNOSIS — J30.89 ENVIRONMENTAL AND SEASONAL ALLERGIES: Primary | ICD-10-CM

## 2022-02-16 PROCEDURE — G8427 DOCREV CUR MEDS BY ELIG CLIN: HCPCS | Performed by: NURSE PRACTITIONER

## 2022-02-16 PROCEDURE — 99214 OFFICE O/P EST MOD 30 MIN: CPT | Performed by: NURSE PRACTITIONER

## 2022-02-16 PROCEDURE — G8417 CALC BMI ABV UP PARAM F/U: HCPCS | Performed by: NURSE PRACTITIONER

## 2022-02-16 PROCEDURE — G8484 FLU IMMUNIZE NO ADMIN: HCPCS | Performed by: NURSE PRACTITIONER

## 2022-02-16 PROCEDURE — 1036F TOBACCO NON-USER: CPT | Performed by: NURSE PRACTITIONER

## 2022-02-16 RX ORDER — LEVOCETIRIZINE DIHYDROCHLORIDE 5 MG/1
5 TABLET, FILM COATED ORAL NIGHTLY
Qty: 60 TABLET | Refills: 2 | Status: SHIPPED | OUTPATIENT
Start: 2022-02-16 | End: 2022-05-17

## 2022-02-16 ASSESSMENT — ENCOUNTER SYMPTOMS
ALLERGIC/IMMUNOLOGIC NEGATIVE: 1
GASTROINTESTINAL NEGATIVE: 1
EYES NEGATIVE: 1
RESPIRATORY NEGATIVE: 1

## 2022-02-16 NOTE — LETTER
[address] [facility]  [address] [facility] [address]  Phone: [phone]  Fax: [phone]    RUPERT Lassiter CNP        February 16, 2022     Patient: Paola Rojas   YOB: 1975   Date of Visit: 2/16/2022       To Whom It May Concern: It is my medical opinion that Danielle Cortez will need to extend his medical leave until March 12. He has not recovered adequately to return to work on February 19, 2022. He should be able to return to work without restrictions at that time. If you have any questions or concerns, please don't hesitate to call.     Sincerely,        RUPERT Lassiter CNP

## 2022-02-16 NOTE — PROGRESS NOTES
darío Perez (:  1975) is a 51 y.o. male,Established patient, here for evaluation of the following chief complaint(s): Other (disability)         ASSESSMENT/PLAN:    Tomy was seen today for other. Diagnoses and all orders for this visit:    Environmental and seasonal allergies  -     levocetirizine (XYZAL) 5 MG tablet; Take 1 tablet by mouth nightly    COVID-19 long hauler    Extension for leave until   Slowly increase walking to 1.5 miles a day           Subjective   SUBJECTIVE/OBJECTIVE:  LIZ Presents today for follow up long hauler syndrome with some left ear discomfort      Review of Systems   Constitutional: Negative. HENT: Positive for ear pain (left). Eyes: Negative. Respiratory: Negative. Cardiovascular: Negative. Gastrointestinal: Negative. Endocrine: Negative. Genitourinary: Negative. Musculoskeletal: Negative. Skin: Negative. Allergic/Immunologic: Negative. Neurological: Negative. Hematological: Negative. Psychiatric/Behavioral: Negative. Vitals:    22 1524   BP: 130/80   Pulse: 66   Temp: 97.6 °F (36.4 °C)   SpO2: 98%     BP Readings from Last 3 Encounters:   22 130/80   21 135/78   21 112/70     Wt Readings from Last 3 Encounters:   22 243 lb (110.2 kg)   21 245 lb (111.1 kg)   21 248 lb (112.5 kg)          Objective   Physical Exam  Constitutional:       Appearance: Normal appearance. He is normal weight. Cardiovascular:      Rate and Rhythm: Normal rate and regular rhythm. Heart sounds: Normal heart sounds. Pulmonary:      Effort: Pulmonary effort is normal.      Breath sounds: Normal breath sounds and air entry. Chest:       Skin:     General: Skin is warm and dry. Neurological:      Mental Status: He is alert and oriented to person, place, and time. Psychiatric:         Mood and Affect: Mood is anxious.          Cognition and Memory: Cognition normal.      Comments: Anxiety noted, encouraged to walk slowly to increase stamina, will not increase lying about in the house                  An electronic signature was used to authenticate this note.     --Mitch Bowling, RUPERT - CNP

## 2022-02-16 NOTE — PATIENT INSTRUCTIONS
Dust every day  Take xyzal every night  Slowly increase   walking to 1.5 miles a day  Continue  To drink plenty of water

## 2022-03-10 ENCOUNTER — TELEPHONE (OUTPATIENT)
Dept: INTERNAL MEDICINE CLINIC | Age: 47
End: 2022-03-10

## 2022-03-10 NOTE — TELEPHONE ENCOUNTER
Please Advise      Pt would like a letter for more days off work said he suppose to return on the 12th but is still having breathing issues he would like it faxed to Michi Monge     Fax [phone]

## 2022-03-10 NOTE — LETTER
[address] [place] [address]  Phone: [phone]  Fax: [phone]    Gato Clarke MD        March 15, 2022     Patient: Krystal Parks   YOB: 1975   Date of Visit: 3/10/2022       To Whom It May Concern:    Tomy Perez is under my care for COVID-19 Long Haulers Syndrome. It is my medical opinion that Tomy Perez should be excused from work from March 12, 2022 until March 22, 2022. If you have any questions or concerns, please don't hesitate to call.     Sincerely,        Ricarda Del Cid MD, MSc, [address], [address] [place] [address] Internal Medicine-Pediatrics  [address] [place] [address].

## 2022-03-15 NOTE — TELEPHONE ENCOUNTER
Pt will be in office Monday the 21st, and was wondering if the letter could cover from 3/12/2022-3/21(or 22)/2022.

## 2022-03-16 NOTE — TELEPHONE ENCOUNTER
Letter will be faxed on over to MATT COTO [facility]

## 2022-03-21 ENCOUNTER — TELEPHONE (OUTPATIENT)
Dept: INTERNAL MEDICINE CLINIC | Age: 47
End: 2022-03-21

## 2022-03-21 ENCOUNTER — OFFICE VISIT (OUTPATIENT)
Dept: INTERNAL MEDICINE CLINIC | Age: 47
End: 2022-03-21
Payer: COMMERCIAL

## 2022-03-21 VITALS
BODY MASS INDEX: 29.34 KG/M2 | HEIGHT: 78 IN | WEIGHT: 253.6 LBS | TEMPERATURE: 98 F | SYSTOLIC BLOOD PRESSURE: 126 MMHG | OXYGEN SATURATION: 97 % | DIASTOLIC BLOOD PRESSURE: 76 MMHG | HEART RATE: 68 BPM

## 2022-03-21 DIAGNOSIS — I26.99 BILATERAL PULMONARY EMBOLISM (HCC): Primary | ICD-10-CM

## 2022-03-21 DIAGNOSIS — I26.99 BILATERAL PULMONARY EMBOLISM (HCC): ICD-10-CM

## 2022-03-21 DIAGNOSIS — F33.1 MODERATE EPISODE OF RECURRENT MAJOR DEPRESSIVE DISORDER (HCC): Primary | ICD-10-CM

## 2022-03-21 DIAGNOSIS — U09.9 COVID-19 LONG HAULER: ICD-10-CM

## 2022-03-21 PROCEDURE — G8427 DOCREV CUR MEDS BY ELIG CLIN: HCPCS | Performed by: INTERNAL MEDICINE

## 2022-03-21 PROCEDURE — G8417 CALC BMI ABV UP PARAM F/U: HCPCS | Performed by: INTERNAL MEDICINE

## 2022-03-21 PROCEDURE — 1036F TOBACCO NON-USER: CPT | Performed by: INTERNAL MEDICINE

## 2022-03-21 PROCEDURE — G8484 FLU IMMUNIZE NO ADMIN: HCPCS | Performed by: INTERNAL MEDICINE

## 2022-03-21 PROCEDURE — 99214 OFFICE O/P EST MOD 30 MIN: CPT | Performed by: INTERNAL MEDICINE

## 2022-03-21 RX ORDER — BUDESONIDE, GLYCOPYRROLATE, AND FORMOTEROL FUMARATE 160; 9; 4.8 UG/1; UG/1; UG/1
2 AEROSOL, METERED RESPIRATORY (INHALATION) 2 TIMES DAILY
Qty: 2 EACH | Refills: 0 | COMMUNITY
Start: 2022-03-21

## 2022-03-21 RX ORDER — RIVAROXABAN 10 MG/1
10 TABLET, FILM COATED ORAL
Qty: 90 TABLET | Refills: 2 | Status: SHIPPED | OUTPATIENT
Start: 2022-03-21 | End: 2022-05-13 | Stop reason: CLARIF

## 2022-03-21 RX ORDER — LACTOBACILLUS RHAMNOSUS GG 10B CELL
1 CAPSULE ORAL DAILY
Qty: 30 CAPSULE | Refills: 3 | Status: SHIPPED | OUTPATIENT
Start: 2022-03-21 | End: 2022-04-19 | Stop reason: SDUPTHER

## 2022-03-21 RX ORDER — BUDESONIDE, GLYCOPYRROLATE, AND FORMOTEROL FUMARATE 160; 9; 4.8 UG/1; UG/1; UG/1
2 AEROSOL, METERED RESPIRATORY (INHALATION) 2 TIMES DAILY
Qty: 10.7 G | Refills: 11 | Status: SHIPPED | OUTPATIENT
Start: 2022-03-21

## 2022-03-21 RX ORDER — CYCLOBENZAPRINE HCL 10 MG
10 TABLET ORAL 3 TIMES DAILY PRN
Qty: 60 TABLET | Refills: 0 | Status: SHIPPED | OUTPATIENT
Start: 2022-03-21 | End: 2022-04-08

## 2022-03-21 RX ORDER — AMOXICILLIN AND CLAVULANATE POTASSIUM 875; 125 MG/1; MG/1
TABLET, FILM COATED ORAL
COMMUNITY
Start: 2022-03-14 | End: 2022-05-02 | Stop reason: CLARIF

## 2022-03-21 NOTE — PROGRESS NOTES
Tomy Perez (:  1975) is a 51 y.o. male,Established patient, here for evaluation of the following chief complaint(s):  Shortness of Breath (Covid in January, unable to work, fatigue, strep throat)         ASSESSMENT/PLAN:  1. Moderate episode of recurrent major depressive disorder (HCC)  -     Budeson-Glycopyrrol-Formoterol (BREZTRI AEROSPHERE) 160-9-4.8 MCG/ACT AERO; Inhale 2 puffs into the lungs 2 times daily, Disp-2 each, R-0Sample  -     Budeson-Glycopyrrol-Formoterol (BREZTRI AEROSPHERE) 160-9-4.8 MCG/ACT AERO; Inhale 2 puffs into the lungs 2 times daily, Disp-10.7 g, R-11Normal  2. COVID-19 long hauler  -     Budeson-Glycopyrrol-Formoterol (BREZTRI AEROSPHERE) 160-9-4.8 MCG/ACT AERO; Inhale 2 puffs into the lungs 2 times daily, Disp-2 each, R-0Sample  -     Budeson-Glycopyrrol-Formoterol (BREZTRI AEROSPHERE) 160-9-4.8 MCG/ACT AERO; Inhale 2 puffs into the lungs 2 times daily, Disp-10.7 g, R-11Normal  3. Bilateral pulmonary embolism ([address]      Return in about 1 month (around 2022) for asthma. Subjective   SUBJECTIVE/OBJECTIVE:  HPI patient comes in for long-covid symptoms. He developed covid in 2021. He has had  Worsening dyspnea since that time. He is intermittently taking his symbicort. He still has dyspnea. He has been off work since that time. Review of Systems       Objective    Vitals:    22 1001   BP: 126/76   Site: Right Upper Arm   Position: Sitting   Cuff Size: Large Adult   Pulse: 68   Temp: 98 °F (36.7 °C)   TempSrc: Infrared   SpO2: 97%   Weight: 253 lb 9.6 oz (115 kg)   Height: 6' 6\" (1.981 m)      Wt Readings from Last 3 Encounters:   22 253 lb 9.6 oz (115 kg)   22 243 lb (110.2 kg)   21 245 lb (111.1 kg)     BP Readings from Last 3 Encounters:   22 126/76   22 130/80   21 135/78     Body mass index is 29.31 kg/m². Facility age limit for growth percentiles is 20 years.    Physical Exam  Constitutional: Appearance: Normal appearance. HENT:      Head: Normocephalic and atraumatic. Nose: No congestion or rhinorrhea. Mouth/Throat:      Mouth: Mucous membranes are moist.      Pharynx: No oropharyngeal exudate or posterior oropharyngeal erythema. Eyes:      General:         Right eye: No discharge. Left eye: No discharge. Pupils: Pupils are equal, round, and reactive to light. Cardiovascular:      Rate and Rhythm: Normal rate. Pulmonary:      Breath sounds: Stridor present. Wheezing present. No rhonchi. Musculoskeletal:      Cervical back: Normal range of motion and neck supple. Neurological:      Mental Status: He is alert. An electronic signature was used to authenticate this note.     --Gato Clarke MD

## 2022-03-21 NOTE — TELEPHONE ENCOUNTER
Please Advise      Pharmacy called and said that Pt insurance didn't cover Eliquis but it do cover Xarelto would you change the script

## 2022-04-07 DIAGNOSIS — F32.1 MODERATE SINGLE CURRENT EPISODE OF MAJOR DEPRESSIVE DISORDER (HCC): ICD-10-CM

## 2022-04-07 DIAGNOSIS — G25.81 RLS (RESTLESS LEGS SYNDROME): ICD-10-CM

## 2022-04-07 RX ORDER — PRAMIPEXOLE DIHYDROCHLORIDE 0.25 MG/1
TABLET ORAL
Qty: 180 TABLET | Refills: 0 | Status: SHIPPED | OUTPATIENT
Start: 2022-04-07 | End: 2022-08-08

## 2022-04-08 RX ORDER — CITALOPRAM 20 MG/1
TABLET ORAL
Qty: 90 TABLET | Refills: 2 | Status: SHIPPED | OUTPATIENT
Start: 2022-04-08

## 2022-04-08 RX ORDER — CYCLOBENZAPRINE HCL 10 MG
TABLET ORAL
Qty: 60 TABLET | Refills: 0 | Status: SHIPPED | OUTPATIENT
Start: 2022-04-08 | End: 2022-05-06

## 2022-04-08 NOTE — TELEPHONE ENCOUNTER
Patient is requesting a refill of their prescription.     Requested Prescriptions     Pending Prescriptions Disp Refills    cyclobenzaprine (FLEXERIL) 10 MG tablet [Pharmacy Med Name: CYCLOBENZAPRINE 10 MG TABLET] 60 tablet 0     Sig: TAKE 1 TABLET BY MOUTH THREE TIMES A DAY AS NEEDED FOR MUSCLE SPASMS    citalopram (CELEXA) 20 MG tablet [Pharmacy Med Name: CITALOPRAM HBR 20 MG TABLET] 90 tablet 2     Sig: TAKE 1 TABLET BY MOUTH EVERY DAY        Recent Visits  Date Type Provider Dept   03/21/22 Office Visit Geraldine Ryan MD [facility]   02/16/22 Office Visit Tabby Tsai, APRN - CNP [facility]   04/09/21 Office Visit Geraldine Ryan MD [facility]   01/07/21 Office Visit Geraldine Ryan MD [facility]   12/10/20 Office Visit Zak El MD [facility]   Showing recent visits within past 540 days with a meds authorizing provider and meeting all other requirements  Future Appointments  Date Type Provider Dept   04/19/22 Appointment Geraldine Ryan MD [facility]   Showing future appointments within next 150 days with a meds authorizing provider and meeting all other requirements     3/21/2022

## 2022-04-13 ENCOUNTER — TELEPHONE (OUTPATIENT)
Dept: INTERNAL MEDICINE CLINIC | Age: 47
End: 2022-04-13

## 2022-04-13 NOTE — TELEPHONE ENCOUNTER
----- Message from [employer] sent at 4/13/2022  3:18 PM EDT -----  Subject: Message to Provider    QUESTIONS  Information for Provider? Pt would like a call back due to wanting to know   if he is ready to go back to work, he is unsure, he originally asked for a   return to work form or note to be faxed to Short term disability, but he   does not know what the form is, he said not to leave a voicemail to text   or email if you do not reach him.   ---------------------------------------------------------------------------  --------------  [address]  What is the best way for the office to contact you? Do not leave any   message, patient will call back for answer  Preferred Call Back Phone Number? [phone]  ---------------------------------------------------------------------------  --------------  SCRIPT ANSWERS  Relationship to Patient?  Self

## 2022-04-14 NOTE — TELEPHONE ENCOUNTER
Called and patient doesn't think he is ready to return to work.  He just has been off for so long he doesn't know what to do and will talk to you more on 4/19

## 2022-04-14 NOTE — TELEPHONE ENCOUNTER
Please ask patient if he thinks he is ready to return to work. We completed the disability paperwork and fmla paperwork.

## 2022-04-19 ENCOUNTER — OFFICE VISIT (OUTPATIENT)
Dept: INTERNAL MEDICINE CLINIC | Age: 47
End: 2022-04-19
Payer: COMMERCIAL

## 2022-04-19 VITALS
SYSTOLIC BLOOD PRESSURE: 130 MMHG | BODY MASS INDEX: 29.73 KG/M2 | HEIGHT: 78 IN | HEART RATE: 83 BPM | DIASTOLIC BLOOD PRESSURE: 82 MMHG | WEIGHT: 257 LBS | TEMPERATURE: 97.9 F | OXYGEN SATURATION: 97 %

## 2022-04-19 DIAGNOSIS — Z13.1 ENCOUNTER FOR SCREENING FOR DIABETES MELLITUS: ICD-10-CM

## 2022-04-19 DIAGNOSIS — R30.0 DYSURIA: ICD-10-CM

## 2022-04-19 DIAGNOSIS — F90.0 ATTENTION DEFICIT HYPERACTIVITY DISORDER (ADHD), PREDOMINANTLY INATTENTIVE TYPE: Primary | ICD-10-CM

## 2022-04-19 DIAGNOSIS — Z13.1 SCREENING FOR DIABETES MELLITUS: ICD-10-CM

## 2022-04-19 DIAGNOSIS — J30.89 ENVIRONMENTAL AND SEASONAL ALLERGIES: ICD-10-CM

## 2022-04-19 LAB
A/G RATIO: 1.9 (ref 1.1–2.2)
ALBUMIN SERPL-MCNC: 4.9 G/DL (ref 3.4–5)
ALP BLD-CCNC: 65 U/L (ref 40–129)
ALT SERPL-CCNC: 37 U/L (ref 10–40)
ANION GAP SERPL CALCULATED.3IONS-SCNC: 12 MMOL/L (ref 3–16)
AST SERPL-CCNC: 21 U/L (ref 15–37)
BILIRUB SERPL-MCNC: 0.4 MG/DL (ref 0–1)
BILIRUBIN URINE: NEGATIVE
BLOOD, URINE: NEGATIVE
BUN BLDV-MCNC: 14 MG/DL (ref 7–20)
CALCIUM SERPL-MCNC: 9.7 MG/DL (ref 8.3–10.6)
CHLORIDE BLD-SCNC: 102 MMOL/L (ref 99–110)
CLARITY: ABNORMAL
CO2: 26 MMOL/L (ref 21–32)
COLOR: YELLOW
CREAT SERPL-MCNC: 1.3 MG/DL (ref 0.9–1.3)
CREATININE URINE: 194.6 MG/DL (ref 39–259)
GFR AFRICAN AMERICAN: >60
GFR NON-AFRICAN AMERICAN: 59
GLUCOSE BLD-MCNC: 102 MG/DL (ref 70–99)
GLUCOSE URINE: NEGATIVE MG/DL
KETONES, URINE: NEGATIVE MG/DL
LEUKOCYTE ESTERASE, URINE: NEGATIVE
MICROALBUMIN UR-MCNC: <1.2 MG/DL
MICROALBUMIN/CREAT UR-RTO: NORMAL MG/G (ref 0–30)
MICROSCOPIC EXAMINATION: ABNORMAL
NITRITE, URINE: NEGATIVE
PH UA: 6 (ref 5–8)
POTASSIUM SERPL-SCNC: 4.7 MMOL/L (ref 3.5–5.1)
PROSTATE SPECIFIC ANTIGEN: 0.66 NG/ML (ref 0–4)
PROTEIN UA: NEGATIVE MG/DL
SODIUM BLD-SCNC: 140 MMOL/L (ref 136–145)
SPECIFIC GRAVITY UA: >=1.03 (ref 1–1.03)
SPECIMEN TYPE: NORMAL
TOTAL PROTEIN: 7.5 G/DL (ref 6.4–8.2)
TRICHOMONAS VAGINALIS SCREEN: NEGATIVE
URINE TYPE: ABNORMAL
UROBILINOGEN, URINE: 0.2 E.U./DL

## 2022-04-19 PROCEDURE — G8427 DOCREV CUR MEDS BY ELIG CLIN: HCPCS | Performed by: INTERNAL MEDICINE

## 2022-04-19 PROCEDURE — 99214 OFFICE O/P EST MOD 30 MIN: CPT | Performed by: INTERNAL MEDICINE

## 2022-04-19 PROCEDURE — 1036F TOBACCO NON-USER: CPT | Performed by: INTERNAL MEDICINE

## 2022-04-19 PROCEDURE — G8417 CALC BMI ABV UP PARAM F/U: HCPCS | Performed by: INTERNAL MEDICINE

## 2022-04-19 RX ORDER — DEXTROAMPHETAMINE SACCHARATE, AMPHETAMINE ASPARTATE MONOHYDRATE, DEXTROAMPHETAMINE SULFATE AND AMPHETAMINE SULFATE 3.75; 3.75; 3.75; 3.75 MG/1; MG/1; MG/1; MG/1
15 CAPSULE, EXTENDED RELEASE ORAL DAILY
Qty: 30 CAPSULE | Refills: 0 | Status: CANCELLED | OUTPATIENT
Start: 2022-04-19 | End: 2022-05-19

## 2022-04-19 RX ORDER — PREDNISONE 50 MG/1
50 TABLET ORAL DAILY
Qty: 5 TABLET | Refills: 0 | Status: SHIPPED | OUTPATIENT
Start: 2022-04-19 | End: 2022-04-26

## 2022-04-19 RX ORDER — DEXTROAMPHETAMINE SACCHARATE, AMPHETAMINE ASPARTATE MONOHYDRATE, DEXTROAMPHETAMINE SULFATE AND AMPHETAMINE SULFATE 3.75; 3.75; 3.75; 3.75 MG/1; MG/1; MG/1; MG/1
15 CAPSULE, EXTENDED RELEASE ORAL DAILY
Qty: 30 CAPSULE | Refills: 0 | Status: SHIPPED | OUTPATIENT
Start: 2022-04-19 | End: 2022-09-20 | Stop reason: DRUGHIGH

## 2022-04-19 RX ORDER — LACTOBACILLUS RHAMNOSUS GG 10B CELL
1 CAPSULE ORAL DAILY
Qty: 30 CAPSULE | Refills: 3 | Status: SHIPPED | OUTPATIENT
Start: 2022-04-19

## 2022-04-19 ASSESSMENT — COLUMBIA-SUICIDE SEVERITY RATING SCALE - C-SSRS
1. WITHIN THE PAST MONTH, HAVE YOU WISHED YOU WERE DEAD OR WISHED YOU COULD GO TO SLEEP AND NOT WAKE UP?: NO
6. HAVE YOU EVER DONE ANYTHING, STARTED TO DO ANYTHING, OR PREPARED TO DO ANYTHING TO END YOUR LIFE?: NO
2. HAVE YOU ACTUALLY HAD ANY THOUGHTS OF KILLING YOURSELF?: NO

## 2022-04-19 ASSESSMENT — PATIENT HEALTH QUESTIONNAIRE - PHQ9
1. LITTLE INTEREST OR PLEASURE IN DOING THINGS: 2
6. FEELING BAD ABOUT YOURSELF - OR THAT YOU ARE A FAILURE OR HAVE LET YOURSELF OR YOUR FAMILY DOWN: 0
8. MOVING OR SPEAKING SO SLOWLY THAT OTHER PEOPLE COULD HAVE NOTICED. OR THE OPPOSITE, BEING SO FIGETY OR RESTLESS THAT YOU HAVE BEEN MOVING AROUND A LOT MORE THAN USUAL: 2
SUM OF ALL RESPONSES TO PHQ QUESTIONS 1-9: 13
3. TROUBLE FALLING OR STAYING ASLEEP: 3
7. TROUBLE CONCENTRATING ON THINGS, SUCH AS READING THE NEWSPAPER OR WATCHING TELEVISION: 0
4. FEELING TIRED OR HAVING LITTLE ENERGY: 2
9. THOUGHTS THAT YOU WOULD BE BETTER OFF DEAD, OR OF HURTING YOURSELF: 1
SUM OF ALL RESPONSES TO PHQ QUESTIONS 1-9: 13
10. IF YOU CHECKED OFF ANY PROBLEMS, HOW DIFFICULT HAVE THESE PROBLEMS MADE IT FOR YOU TO DO YOUR WORK, TAKE CARE OF THINGS AT HOME, OR GET ALONG WITH OTHER PEOPLE: 1
5. POOR APPETITE OR OVEREATING: 2
SUM OF ALL RESPONSES TO PHQ QUESTIONS 1-9: 12
2. FEELING DOWN, DEPRESSED OR HOPELESS: 1
SUM OF ALL RESPONSES TO PHQ9 QUESTIONS 1 & 2: 3
SUM OF ALL RESPONSES TO PHQ QUESTIONS 1-9: 13

## 2022-04-19 NOTE — PROGRESS NOTES
Tomy Perez (:  1975) is a 51 y.o. male,Established patient, here for evaluation of the following chief complaint(s):  Asthma and Other (Cyst on left testicle has increased in size and pain is consistent now)         ASSESSMENT/PLAN:  1. Attention deficit hyperactivity disorder (ADHD), predominantly inattentive type  -     amphetamine-dextroamphetamine (ADDERALL XR) 15 MG extended release capsule; Take 1 capsule by mouth daily for 30 days. , Disp-30 capsule, R-0Normal  2. Encounter for screening for diabetes mellitus  3. Dysuria  -     C.trachomatis N.gonorrhoeae DNA, Urine; Future  -     Trichomonas by EIA; Future  -     Urinalysis; Future  -     Comprehensive Metabolic Panel; Future  -     Microalbumin / Creatinine Urine Ratio; Future  -     PSA, Prostatic Specific Antigen; Future  4. Screening for diabetes mellitus  -     Hemoglobin A1C; Future  5. Environmental and seasonal allergies  -     predniSONE (DELTASONE) 50 MG tablet; Take 1 tablet by mouth daily for 7 days, Disp-5 tablet, R-0Normal      Return in about 1 month (around 2022) for asthma 30 min. Subjective   SUBJECTIVE/OBJECTIVE:  HPI ADD/ADHD:  Current treatment: Adderall XR- 15mg, which has been effective. Residual symptoms: impulsivity. Medication side effects: None. Patient denies None. Allergic Rhinitis:  Current treatment includes intranasal antihistamine- cetirizine. Residual symptoms: none. He denies purulent nasal discharge and sputum, fevers, lymphadenopathy, and sore throat. Review of Systems   Constitutional: Negative for diaphoresis and fatigue. HENT: Negative for ear pain and facial swelling. Eyes: Negative for pain and redness. Respiratory: Negative for cough and choking. Cardiovascular: Negative for chest pain and leg swelling.           Objective    Vitals:    22 0919   BP: 130/82   Site: Left Upper Arm   Position: Sitting   Cuff Size: Large Adult   Pulse: 83   Temp: 97.9 °F (36.6 °C) TempSrc: Infrared   SpO2: 97%   Weight: 257 lb (116.6 kg)   Height: 6' 6\" (1.981 m)      Wt Readings from Last 3 Encounters:   04/19/22 257 lb (116.6 kg)   03/21/22 253 lb 9.6 oz (115 kg)   02/16/22 243 lb (110.2 kg)     BP Readings from Last 3 Encounters:   04/19/22 130/82   03/21/22 126/76   02/16/22 130/80     Body mass index is 29.7 kg/m². Facility age limit for growth percentiles is 20 years. Physical Exam  Constitutional:       Appearance: Normal appearance. HENT:      Head: Normocephalic and atraumatic. Right Ear: Tympanic membrane normal.      Left Ear: Tympanic membrane normal.      Nose: Nose normal. No congestion or rhinorrhea. Mouth/Throat:      Mouth: Mucous membranes are moist.      Pharynx: No oropharyngeal exudate or posterior oropharyngeal erythema. Eyes:      General:         Right eye: No discharge. Left eye: No discharge. Pupils: Pupils are equal, round, and reactive to light. Cardiovascular:      Rate and Rhythm: Normal rate and regular rhythm. Pulmonary:      Effort: Pulmonary effort is normal. No respiratory distress. Breath sounds: No stridor. No wheezing or rhonchi. Abdominal:      General: Abdomen is flat. There is no distension. Palpations: There is no mass. Tenderness: There is no abdominal tenderness. Hernia: No hernia is present. Musculoskeletal:      Cervical back: Normal range of motion. No rigidity. Neurological:      Mental Status: He is alert. An electronic signature was used to authenticate this note.     --Gerber Bustos MD

## 2022-04-20 LAB
C. TRACHOMATIS DNA ,URINE: NEGATIVE
ESTIMATED AVERAGE GLUCOSE: 114 MG/DL
HBA1C MFR BLD: 5.6 %
N. GONORRHOEAE DNA, URINE: NEGATIVE

## 2022-04-20 ASSESSMENT — ENCOUNTER SYMPTOMS
FACIAL SWELLING: 0
COUGH: 0
EYE REDNESS: 0
CHOKING: 0
EYE PAIN: 0

## 2022-04-22 ENCOUNTER — TELEPHONE (OUTPATIENT)
Dept: INTERNAL MEDICINE CLINIC | Age: 47
End: 2022-04-22

## 2022-04-22 NOTE — TELEPHONE ENCOUNTER
Please advise on a return to work letter. I can write it but I do not know what dates may have been suggested for the time frame to return.

## 2022-04-22 NOTE — TELEPHONE ENCOUNTER
Pt called stating he has not heard anything back about his return to work letter that was brought up at his last appointment on 4/19.

## 2022-04-23 NOTE — TELEPHONE ENCOUNTER
Patient needs to tell us when he wants to return to work. He also needs to call is disability insurance company to confirm that his return to work date is April 30, 2022. Then, we can write the return to work.

## 2022-04-24 NOTE — TELEPHONE ENCOUNTER
Please let patient know that I completed his disability paperwork extending his time off work until June 1, 2022. Please ask if he needs additional information.

## 2022-04-25 NOTE — TELEPHONE ENCOUNTER
Left a message to the patient stating the leave was extended per Argenis Stewart and that if he needed any additional information afterwards, he can call us or send us a [employer] message if needed.

## 2022-04-29 ENCOUNTER — TELEPHONE (OUTPATIENT)
Dept: INTERNAL MEDICINE CLINIC | Age: 47
End: 2022-04-29

## 2022-04-29 NOTE — TELEPHONE ENCOUNTER
Patient called in regarding a return to work statement    Patient should return to work  06/01/2022    Patient will be stopping by to  paperwork Anette Gupta)  -paperwork is ready and at the

## 2022-05-02 ENCOUNTER — OFFICE VISIT (OUTPATIENT)
Dept: SURGERY | Age: 47
End: 2022-05-02
Payer: COMMERCIAL

## 2022-05-02 VITALS
BODY MASS INDEX: 30.08 KG/M2 | HEIGHT: 78 IN | DIASTOLIC BLOOD PRESSURE: 78 MMHG | WEIGHT: 260 LBS | SYSTOLIC BLOOD PRESSURE: 118 MMHG

## 2022-05-02 DIAGNOSIS — R10.32 LEFT INGUINAL PAIN: ICD-10-CM

## 2022-05-02 PROCEDURE — G8417 CALC BMI ABV UP PARAM F/U: HCPCS | Performed by: SURGERY

## 2022-05-02 PROCEDURE — 1036F TOBACCO NON-USER: CPT | Performed by: SURGERY

## 2022-05-02 PROCEDURE — 99203 OFFICE O/P NEW LOW 30 MIN: CPT | Performed by: SURGERY

## 2022-05-02 PROCEDURE — G8427 DOCREV CUR MEDS BY ELIG CLIN: HCPCS | Performed by: SURGERY

## 2022-05-02 ASSESSMENT — ENCOUNTER SYMPTOMS
CHEST TIGHTNESS: 0
COLOR CHANGE: 0
EYE DISCHARGE: 0
EYE ITCHING: 0
ABDOMINAL PAIN: 0
APNEA: 1
BACK PAIN: 0
ABDOMINAL DISTENTION: 0

## 2022-05-02 NOTE — PROGRESS NOTES
[facility]  SUBJECTIVE:    Chief Complaint: Left inguinal pain    Tomy Perez   1975   51 y.o. male presents with several year history of left inguinal pain. Pain begins along the level of ASIS and extends to left scrotum and groin. Worse with lifting straining and sexual activity and relieved with rest.  Patient also has a testicular cyst evaluated by urologist Dr. Neftali Neri but cyst is not likely causing pain. With concern for possible hernia referred to general surgery for evaluation. Has never had imaging of the groin. History of laparoscopic right inguinal hernia repair several years ago but current symptoms feel different than the right inguinal hernia. Was not aware of umbilical hernia until exam today and also has no symptoms related. Medical conditions include sleep apnea, history of pulmonary emboli and is anticoagulated with Eliquis. Denies fevers chills chest pain dyspnea otherwise nausea vomiting jaundice dysuria change in appetite weight bowel movements or other complaints    Review of Systems   Constitutional: Positive for activity change. Negative for appetite change. HENT: Negative for congestion and dental problem. Eyes: Negative for discharge and itching. Respiratory: Positive for apnea. Negative for chest tightness. Cardiovascular: Negative for chest pain and leg swelling. Gastrointestinal: Negative for abdominal distention and abdominal pain. Endocrine: Negative for cold intolerance and heat intolerance. Genitourinary: Positive for testicular pain. Negative for difficulty urinating. Musculoskeletal: Negative for arthralgias and back pain. Skin: Negative for color change and pallor. Allergic/Immunologic: Negative for environmental allergies and food allergies. Neurological: Negative for dizziness and facial asymmetry. Hematological: Negative for adenopathy. Does not bruise/bleed easily. Psychiatric/Behavioral: Negative for agitation and behavioral problems. Past Medical History:   Diagnosis Date    Asthma     since P.E.    Collar bone fracture     BIJAL (generalized anxiety disorder)     Migraine     [facility] of [facility]    Obstructive sleep apnea syndrome 2021    Pulmonary emboli ([facility]     ,     Sleep apnea     just dx'ed; getting CPAP     Past Surgical History:   Procedure Laterality Date    APPENDECTOMY  2019    COLONOSCOPY N/A 3/8/2021    COLONOSCOPY WITH ANESTHESIA -SLEEP APNEA- performed by Yvon Meneses MD at [address] Left     HERNIA REPAIR Right 2009    inguinal     Social History     Socioeconomic History    Marital status: Single     Spouse name: Not on file    Number of children: 2    Years of education: Not on file    Highest education level: Not on file   Occupational History    Occupation: [employer]     Tobacco Use    Smoking status: Former Smoker     Packs/day: 0.50     Years: 25.00     Pack years: 12.50     Types: Cigarettes     Start date: 1989     Quit date: 2015     Years since quittin.6    Smokeless tobacco: Never Used    Tobacco comment: started to smoke at 12 / smoked up to 1 to 1.5 p.p.d / quit smoking 2015   Vaping Use    Vaping Use: Former    Substances: Always   Substance and Sexual Activity    Alcohol use:  Yes     Alcohol/week: 2.0 standard drinks     Types: 2 Cans of beer per week     Comment: socially    Drug use: No    Sexual activity: Yes     Partners: Female     Comment: 1 child   Other Topics Concern    Not on file   Social History Narrative    Lives with mother-December 10, 2020      Social Determinants of Health     Financial Resource Strain: Low Risk     Difficulty of Paying Living Expenses: Not hard at all   Food Insecurity: No Food Insecurity    Worried About [address] in the Last Year: Never true  Ran Out of Food in the Last Year: Never true   Transportation Needs:     Lack of Transportation (Medical): Not on file    Lack of Transportation (Non-Medical): Not on file   Physical Activity:     Days of Exercise per Week: Not on file    Minutes of Exercise per Session: Not on file   Stress:     Feeling of Stress : Not on file   Social Connections:     Frequency of Communication with Friends and Family: Not on file    Frequency of Social Gatherings with Friends and Family: Not on file    Attends Protestant Services: Not on file    Active Member of [address] [place] [address] [email or web address] or Organizations: Not on file    Attends Club or Organization Meetings: Not on file    Marital Status: Not on file   Intimate Partner Violence:     Fear of Current or Ex-Partner: Not on file    Emotionally Abused: Not on file    Physically Abused: Not on file    Sexually Abused: Not on file   Housing Stability:     Unable to Pay for Housing in the Last Year: Not on file    Number of [place] in the Last Year: Not on file    Unstable Housing in the Last Year: Not on file      Family History   Problem Relation Age of Onset    Diabetes Mother     Diabetes Sister     Clotting Disorder Maternal Grandfather     Diabetes Sister     Clotting Disorder Father      Current Outpatient Medications   Medication Sig Dispense Refill    Apixaban (ELIQUIS PO) Take by mouth      lactobacillus (CULTURELLE) CAPS capsule Take 1 capsule by mouth daily 30 capsule 3    amphetamine-dextroamphetamine (ADDERALL XR) 15 MG extended release capsule Take 1 capsule by mouth daily for 30 days.  30 capsule 0    cyclobenzaprine (FLEXERIL) 10 MG tablet TAKE 1 TABLET BY MOUTH THREE TIMES A DAY AS NEEDED FOR MUSCLE SPASMS 60 tablet 0    citalopram (CELEXA) 20 MG tablet TAKE 1 TABLET BY MOUTH EVERY DAY 90 tablet 2    pramipexole (MIRAPEX) 0.25 MG tablet TAKE 1 TO 2 TABLETS BY MOUTH AT BEDTIME 180 tablet 0    Budeson-Glycopyrrol-Formoterol (BREZTRI AEROSPHERE) 160-9-4.8 MCG/ACT AERO Inhale 2 puffs into the lungs 2 times daily 2 each 0    Budeson-Glycopyrrol-Formoterol (BREZTRI AEROSPHERE) 160-9-4.8 MCG/ACT AERO Inhale 2 puffs into the lungs 2 times daily 10.7 g 11    levocetirizine (XYZAL) 5 MG tablet Take 1 tablet by mouth nightly 60 tablet 2    valACYclovir (VALTREX) 500 MG tablet Take 1 tablet by mouth daily 30 tablet 5    rivaroxaban (XARELTO) 10 MG TABS tablet Take 1 tablet by mouth daily (with breakfast) (Patient not taking: Reported on 5/2/2022) 90 tablet 2    amphetamine-dextroamphetamine (ADDERALL XR) 15 MG extended release capsule Take 1 capsule by mouth daily for 30 days. 30 capsule 0    albuterol sulfate HFA (PROVENTIL HFA) 108 (90 Base) MCG/ACT inhaler Inhale 2 puffs into the lungs every 4 hours as needed for Wheezing or Shortness of Breath 1 Inhaler 11     No current facility-administered medications for this visit. No Known Allergies     OBJECTIVE:  /78   Ht 6' 6\" (1.981 m)   Wt 260 lb (117.9 kg)   BMI 30.05 kg/m²    Physical Exam  Constitutional:       General: He is not in acute distress. Appearance: He is well-developed. He is not diaphoretic. HENT:      Head: Normocephalic and atraumatic. Eyes:      Conjunctiva/sclera: Conjunctivae normal.      Pupils: Pupils are equal, round, and reactive to light. Neck:      Vascular: No JVD. Cardiovascular:      Rate and Rhythm: Normal rate and regular rhythm. Heart sounds: Normal heart sounds. Pulmonary:      Effort: Pulmonary effort is normal. No respiratory distress. Breath sounds: Normal breath sounds. No wheezing. Abdominal:      General: Bowel sounds are normal. There is no distension. Palpations: Abdomen is soft. There is no mass. Tenderness: There is no abdominal tenderness. There is no guarding.           Comments: No obvious inguinal hernia noted on either side despite exam in multiple positions, no point tenderness either or visible etiologies for pain Musculoskeletal:      Cervical back: Normal range of motion and neck supple. Lymphadenopathy:      Cervical: No cervical adenopathy. Skin:     General: Skin is warm and dry. Findings: No erythema or rash. Neurological:      Mental Status: He is alert and oriented to person, place, and time. Psychiatric:         Behavior: Behavior normal.         Thought Content: Thought content normal.         Judgment: Judgment normal.          Labs:  Orders Only on 04/19/2022   Component Date Value Ref Range Status    Color, UA 04/19/2022 Yellow  Straw/Yellow Final    Clarity, UA 04/19/2022 SL CLOUDY* Clear Final    Glucose, Ur 04/19/2022 Negative  Negative mg/dL Final    Bilirubin Urine 04/19/2022 Negative  Negative Final    Ketones, Urine 04/19/2022 Negative  Negative mg/dL Final    Specific Gravity, UA 04/19/2022 >=1.030  1.005 - 1.030 Final    Blood, Urine 04/19/2022 Negative  Negative Final    pH, UA 04/19/2022 6.0  5.0 - 8.0 Final    Protein, UA 04/19/2022 Negative  Negative mg/dL Final    Urobilinogen, Urine 04/19/2022 0.2  <2.0 E.U./dL Final    Nitrite, Urine 04/19/2022 Negative  Negative Final    Leukocyte Esterase, Urine 04/19/2022 Negative  Negative Final    Microscopic Examination 04/19/2022 Not Indicated   Final    Urine Type 04/19/2022 Cleancatch   Final    Comment: Urine received in a tube containing preservative. This preservative will not effect the physical  characteristics of the urine.  TRICHOMONAS VAGINALIS SCREEN 04/19/2022 Negative  Negative Final    Comment: \"False negative results may occur in specimens  collected incorrectly or if the antigen concentration  is below the sensitivity of the test.  Test results  should be used only as adjunct to other  clinical information.       Specimen Type 04/19/2022 Urine   Final    Comment: The use of this test on urine was developed and its  performance characteristics were determined by [facility]  laboratory.  It has not been cleared or approved by the [facility] and Drug Administration.  C. trachomatis DNA ,Urine 04/19/2022 Negative  Negative Final    N. gonorrhoeae DNA, Urine 04/19/2022 Negative  Negative Final    Sodium 04/19/2022 140  136 - 145 mmol/L Final    Potassium 04/19/2022 4.7  3.5 - 5.1 mmol/L Final    Chloride 04/19/2022 102  99 - 110 mmol/L Final    CO2 04/19/2022 26  21 - 32 mmol/L Final    Anion Gap 04/19/2022 12  3 - 16 Final    Glucose 04/19/2022 102* 70 - 99 mg/dL Final    BUN 04/19/2022 14  7 - 20 mg/dL Final    CREATININE 04/19/2022 1.3  0.9 - 1.3 mg/dL Final    GFR Non- 04/19/2022 59* >60 Final    Comment: >60 mL/min/1.73m2 EGFR, calc. for ages 25 and older using the  MDRD formula (not corrected for weight), is valid for stable  renal function.  GFR  04/19/2022 >60  >60 Final    Comment: Chronic Kidney Disease: less than 60 ml/min/1.73 sq.m. Kidney Failure: less than 15 ml/min/1.73 sq.m. Results valid for patients 18 years and older.       Calcium 04/19/2022 9.7  8.3 - 10.6 mg/dL Final    Total Protein 04/19/2022 7.5  6.4 - 8.2 g/dL Final    Albumin 04/19/2022 4.9  3.4 - 5.0 g/dL Final    Albumin/Globulin Ratio 04/19/2022 1.9  1.1 - 2.2 Final    Total Bilirubin 04/19/2022 0.4  0.0 - 1.0 mg/dL Final    Alkaline Phosphatase 04/19/2022 65  40 - 129 U/L Final    ALT 04/19/2022 37  10 - 40 U/L Final    AST 04/19/2022 21  15 - 37 U/L Final    Hemoglobin A1C 04/19/2022 5.6  See comment % Final    Comment: Comment:  Diagnosis of Diabetes: > or = 6.5%  Increased risk of diabetes (Prediabetes): 5.7-6.4%  Glycemic Control: Nonpregnant Adults: <7.0%                    Pregnant: <6.0%        eAG 04/19/2022 114.0  mg/dL Final    Microalbumin, Random Urine 04/19/2022 <1.20  <2.0 mg/dL Final    Creatinine, Ur 04/19/2022 194.6  39.0 - 259.0 mg/dL Final    Microalbumin Creatinine Ratio 04/19/2022 see below  0.0 - 30.0 mg/g Final    Comment: Ratio cannot be calculated since microalbumin level is below  the lower detection limit.  PSA 04/19/2022 0.66  0.00 - 4.00 ng/mL Final       Imaging:  No results found. ASSESSMENT:  Left inguinal pain  Reducible umbilical hernia    PLAN:  1. We will order CT of abdomen pelvis to define abdominal wall anatomy, evaluate for left inguinal hernia or other etiologies that could be causing left inguinal pain  2. No lifting restrictions in meantime  3. Will return to office in 2 weeks to discuss results and further options regarding left inguinal pain and umbilical hernia  4. Patient does admit to low back pain and if inguinal hernia work-up negative, may benefit from spine referral to evaluate for radiating pain to the groin from the spine    Andrae Scruggs MD, FACS  5/2/2022  10:19 AM

## 2022-05-02 NOTE — PATIENT INSTRUCTIONS
1.  We will order CT of abdomen pelvis to define abdominal wall anatomy, evaluate for left inguinal hernia or other etiologies that could be causing left inguinal pain  2. No lifting restrictions in meantime  3. Will return to office in 2 weeks to discuss results and further options regarding left inguinal pain and umbilical hernia  4.   Patient does admit to low back pain and if inguinal hernia work-up negative, may benefit from spine referral to evaluate for radiating pain to the groin from the spine

## 2022-05-06 RX ORDER — CYCLOBENZAPRINE HCL 10 MG
TABLET ORAL
Qty: 60 TABLET | Refills: 0 | Status: SHIPPED | OUTPATIENT
Start: 2022-05-06 | End: 2022-06-01

## 2022-05-06 NOTE — TELEPHONE ENCOUNTER
Patient is requesting a refill of their prescription.     Requested Prescriptions     Pending Prescriptions Disp Refills    cyclobenzaprine (FLEXERIL) 10 MG tablet [Pharmacy Med Name: CYCLOBENZAPRINE 10 MG TABLET] 60 tablet 0     Sig: TAKE 1 TABLET BY MOUTH THREE TIMES A DAY AS NEEDED FOR MUSCLE SPASMS        Recent Visits  Date Type Provider Dept   04/19/22 Office Visit Victoriano Robert MD [facility]   03/21/22 Office Visit Victoriano Robert MD [facility]   02/16/22 Office Visit RUPERT Castro - CNP [facility]   04/09/21 Office Visit Victoriano Robert MD [facility]   01/07/21 Office Visit Victoriano Robert MD [facility]   12/10/20 Office Visit Alison Claudio MD [facility]   Showing recent visits within past 540 days with a meds authorizing provider and meeting all other requirements  Future Appointments  Date Type Provider Dept   05/24/22 Appointment Victoriano Robert MD [facility]   Showing future appointments within next 150 days with a meds authorizing provider and meeting all other requirements     4/19/2022

## 2022-05-10 NOTE — TELEPHONE ENCOUNTER
----- Message from Adma Deandraaustin sent at 5/10/2022  3:16 PM EDT -----  Subject: Message to Provider    QUESTIONS  Information for Provider? Sindi Nation called in and said they faced   over paper work on 4/20 and 5/2. They need medical records from his office   visits please fax to [phone]. ext [phone] the claim number [id number]  ---------------------------------------------------------------------------  --------------  CALL BACK INFO  What is the best way for the office to contact you? OK to leave message on   voicemail  Preferred Call Back Phone Number?  [phone]  ---------------------------------------------------------------------------  --------------  SCRIPT ANSWERS  undefined

## 2022-05-10 NOTE — TELEPHONE ENCOUNTER
Please Advise    Pt said he need his last office note faxed to MATT CATES [facility]     Fax:-[phone]

## 2022-05-12 ENCOUNTER — HOSPITAL ENCOUNTER (OUTPATIENT)
Dept: CT IMAGING | Age: 47
Discharge: HOME | End: 2022-05-12
Payer: COMMERCIAL

## 2022-05-12 DIAGNOSIS — R10.32 LEFT INGUINAL PAIN: ICD-10-CM

## 2022-05-12 PROCEDURE — 6360000004 HC RX CONTRAST MEDICATION: Performed by: SURGERY

## 2022-05-12 PROCEDURE — 74177 CT ABD & PELVIS W/CONTRAST: CPT

## 2022-05-12 RX ADMIN — IOHEXOL 50 ML: 240 INJECTION, SOLUTION INTRATHECAL; INTRAVASCULAR; INTRAVENOUS; ORAL at 17:08

## 2022-05-12 RX ADMIN — IOPAMIDOL 75 ML: 755 INJECTION, SOLUTION INTRAVENOUS at 17:08

## 2022-05-13 ENCOUNTER — OFFICE VISIT (OUTPATIENT)
Dept: SURGERY | Age: 47
End: 2022-05-13
Payer: COMMERCIAL

## 2022-05-13 ENCOUNTER — TELEPHONE (OUTPATIENT)
Dept: SURGERY | Age: 47
End: 2022-05-13

## 2022-05-13 VITALS — WEIGHT: 256 LBS | DIASTOLIC BLOOD PRESSURE: 72 MMHG | BODY MASS INDEX: 29.58 KG/M2 | SYSTOLIC BLOOD PRESSURE: 120 MMHG

## 2022-05-13 DIAGNOSIS — R10.32 LEFT INGUINAL PAIN: Primary | ICD-10-CM

## 2022-05-13 PROCEDURE — G8417 CALC BMI ABV UP PARAM F/U: HCPCS | Performed by: SURGERY

## 2022-05-13 PROCEDURE — G8427 DOCREV CUR MEDS BY ELIG CLIN: HCPCS | Performed by: SURGERY

## 2022-05-13 PROCEDURE — 1036F TOBACCO NON-USER: CPT | Performed by: SURGERY

## 2022-05-13 PROCEDURE — 99213 OFFICE O/P EST LOW 20 MIN: CPT | Performed by: SURGERY

## 2022-05-13 RX ORDER — SODIUM CHLORIDE 9 MG/ML
INJECTION, SOLUTION INTRAVENOUS PRN
Status: CANCELLED | OUTPATIENT
Start: 2022-05-13

## 2022-05-13 RX ORDER — SODIUM CHLORIDE 0.9 % (FLUSH) 0.9 %
5-40 SYRINGE (ML) INJECTION EVERY 12 HOURS SCHEDULED
Status: CANCELLED | OUTPATIENT
Start: 2022-05-13

## 2022-05-13 RX ORDER — SODIUM CHLORIDE 0.9 % (FLUSH) 0.9 %
5-40 SYRINGE (ML) INJECTION PRN
Status: CANCELLED | OUTPATIENT
Start: 2022-05-13

## 2022-05-13 NOTE — PROGRESS NOTES
[facility]  SUBJECTIVE:    Chief Complaint: left inguinal pain    Tomy Perez   1975   51 y.o. male presents for followup regarding left inguinal pain. Pain begins along the level of ASIS and extends to left scrotum and groin. Worse with lifting straining and sexual activity and relieved with rest.  Patient also has a testicular cyst evaluated by urologist Dr. Carolyn Christopher but cyst is not likely causing pain. With concern for possible hernia referred to general surgery for evaluation. History of laparoscopic right inguinal hernia repair several years ago but current symptoms feel different than the right inguinal hernia. Was not aware of umbilical hernia until exam today and also has no symptoms related. Medical conditions include sleep apnea, history of pulmonary emboli and is anticoagulated with Eliquis. Denies fevers chills chest pain dyspnea otherwise nausea vomiting jaundice dysuria change in appetite weight bowel movements or other complaints.  At last exam he was found to have a small umbilical hernia and in interim CT does not show inguinal hernia    Past Medical History:   Diagnosis Date    Asthma     since P.E.    Collar bone fracture     BIJAL (generalized anxiety disorder)     Migraine     Moderate episode of recurrent major depressive disorder ([address] 05/01/2018    Obstructive sleep apnea syndrome 4/6/2021    Pulmonary emboli ([address]     2017, 2018    Sleep apnea 2021    just dx'ed; getting CPAP     Past Surgical History:   Procedure Laterality Date    APPENDECTOMY  2019    COLONOSCOPY N/A 3/8/2021    COLONOSCOPY WITH ANESTHESIA -SLEEP APNEA- performed by Rona Mckay MD at [address] [facility] [address] Left 2000    HERNIA REPAIR Right 2009    inguinal     Social History     Socioeconomic History    Marital status: Single     Spouse name: Not on file    Number of children: 2    Years of education: Not on file    Highest education level: Not on file   Occupational History    Occupation: [employer]     Tobacco Use    Smoking status: Former Smoker     Packs/day: 0.50     Years: 25.00     Pack years: 12.50     Types: Cigarettes     Start date: 1989     Quit date: 2015     Years since quittin.7    Smokeless tobacco: Never Used    Tobacco comment: started to smoke at 12 / smoked up to 1 to 1.5 p.p.d / quit smoking 2015   Vaping Use    Vaping Use: Former    Substances: Always   Substance and Sexual Activity    Alcohol use: Yes     Alcohol/week: 2.0 standard drinks     Types: 2 Cans of beer per week     Comment: socially    Drug use: No    Sexual activity: Yes     Partners: Female     Comment: 1 child   Other Topics Concern    Not on file   Social History Narrative    Lives with mother-December 10, 2020      Social Determinants of Health     Financial Resource Strain: Low Risk     Difficulty of Paying Living Expenses: Not hard at all   Food Insecurity: No Food Insecurity    Worried About Running Out of Food in the Last Year: Never true    [address] in the Last Year: Never true   Transportation Needs:     Lack of Transportation (Medical): Not on file    Lack of Transportation (Non-Medical):  Not on file   Physical Activity:     Days of Exercise per Week: Not on file    Minutes of Exercise per Session: Not on file   Stress:     Feeling of Stress : Not on file   Social Connections:     Frequency of Communication with Friends and Family: Not on file    Frequency of Social Gatherings with Friends and Family: Not on file    Attends Yarsani Services: Not on file    Active Member of Clubs or Organizations: Not on file    Attends Club or Organization Meetings: Not on file    Marital Status: Not on file   Intimate Partner Violence:     Fear of Current or Ex-Partner: Not on file    Emotionally Abused: Not on file    Physically Abused: Not on file    Sexually Abused: Not on file   Housing Stability:  Unable to Pay for Housing in the Last Year: Not on file    Number of Places Lived in the Last Year: Not on file    Unstable Housing in the Last Year: Not on file      Family History   Problem Relation Age of Onset    Diabetes Mother     Diabetes Sister     Clotting Disorder Maternal Grandfather     Diabetes Sister     Clotting Disorder Father      Current Outpatient Medications   Medication Sig Dispense Refill    cyclobenzaprine (FLEXERIL) 10 MG tablet TAKE 1 TABLET BY MOUTH THREE TIMES A DAY AS NEEDED FOR MUSCLE SPASMS 60 tablet 0    Apixaban (ELIQUIS PO) Take by mouth      lactobacillus (CULTURELLE) CAPS capsule Take 1 capsule by mouth daily 30 capsule 3    amphetamine-dextroamphetamine (ADDERALL XR) 15 MG extended release capsule Take 1 capsule by mouth daily for 30 days. 30 capsule 0    citalopram (CELEXA) 20 MG tablet TAKE 1 TABLET BY MOUTH EVERY DAY 90 tablet 2    pramipexole (MIRAPEX) 0.25 MG tablet TAKE 1 TO 2 TABLETS BY MOUTH AT BEDTIME 180 tablet 0    Budeson-Glycopyrrol-Formoterol (BREZTRI AEROSPHERE) 160-9-4.8 MCG/ACT AERO Inhale 2 puffs into the lungs 2 times daily 2 each 0    Budeson-Glycopyrrol-Formoterol (BREZTRI AEROSPHERE) 160-9-4.8 MCG/ACT AERO Inhale 2 puffs into the lungs 2 times daily 10.7 g 11    levocetirizine (XYZAL) 5 MG tablet Take 1 tablet by mouth nightly 60 tablet 2    valACYclovir (VALTREX) 500 MG tablet Take 1 tablet by mouth daily 30 tablet 5    amphetamine-dextroamphetamine (ADDERALL XR) 15 MG extended release capsule Take 1 capsule by mouth daily for 30 days. 30 capsule 0    albuterol sulfate HFA (PROVENTIL HFA) 108 (90 Base) MCG/ACT inhaler Inhale 2 puffs into the lungs every 4 hours as needed for Wheezing or Shortness of Breath 1 Inhaler 11     No current facility-administered medications for this visit.       No Known Allergies     Review of Systems:  Review of systems performed and negative with the exception of the above findings    OBJECTIVE:  /72 Wt 256 lb (116.1 kg)   BMI 29.58 kg/m²      Physical Exam:  General appearance: alert, appears stated age, cooperative and no distress  Head: Normocephalic, without obvious abnormality, atraumatic  Lungs: clear to auscultation bilaterally  Heart: regular rate and rhythm, S1, S2 normal, no murmur, click, rub or gallop  Abdomen: soft, non-tender completely reducible umbilical hernia with 1cm fascial defect and normal overlying skin, no palpable left inguinal hernia or point tenderness    Orders Only on 04/19/2022   Component Date Value Ref Range Status    Color, UA 04/19/2022 Yellow  Straw/Yellow Final    Clarity, UA 04/19/2022 SL CLOUDY* Clear Final    Glucose, Ur 04/19/2022 Negative  Negative mg/dL Final    Bilirubin Urine 04/19/2022 Negative  Negative Final    Ketones, Urine 04/19/2022 Negative  Negative mg/dL Final    Specific Gravity, UA 04/19/2022 >=1.030  1.005 - 1.030 Final    Blood, Urine 04/19/2022 Negative  Negative Final    pH, UA 04/19/2022 6.0  5.0 - 8.0 Final    Protein, UA 04/19/2022 Negative  Negative mg/dL Final    Urobilinogen, Urine 04/19/2022 0.2  <2.0 E.U./dL Final    Nitrite, Urine 04/19/2022 Negative  Negative Final    Leukocyte Esterase, Urine 04/19/2022 Negative  Negative Final    Microscopic Examination 04/19/2022 Not Indicated   Final    Urine Type 04/19/2022 Cleancatch   Final    Comment: Urine received in a tube containing preservative. This preservative will not effect the physical  characteristics of the urine.  TRICHOMONAS VAGINALIS SCREEN 04/19/2022 Negative  Negative Final    Comment: \"False negative results may occur in specimens  collected incorrectly or if the antigen concentration  is below the sensitivity of the test.  Test results  should be used only as adjunct to other  clinical information.       Specimen Type 04/19/2022 Urine   Final    Comment: The use of this test on urine was developed and its  performance characteristics were determined by [facility]  laboratory. It has not been cleared or approved by the [facility] and Drug Administration.  C. trachomatis DNA ,Urine 04/19/2022 Negative  Negative Final    N. gonorrhoeae DNA, Urine 04/19/2022 Negative  Negative Final    Sodium 04/19/2022 140  136 - 145 mmol/L Final    Potassium 04/19/2022 4.7  3.5 - 5.1 mmol/L Final    Chloride 04/19/2022 102  99 - 110 mmol/L Final    CO2 04/19/2022 26  21 - 32 mmol/L Final    Anion Gap 04/19/2022 12  3 - 16 Final    Glucose 04/19/2022 102* 70 - 99 mg/dL Final    BUN 04/19/2022 14  7 - 20 mg/dL Final    CREATININE 04/19/2022 1.3  0.9 - 1.3 mg/dL Final    GFR Non- 04/19/2022 59* >60 Final    Comment: >60 mL/min/1.73m2 EGFR, calc. for ages 25 and older using the  MDRD formula (not corrected for weight), is valid for stable  renal function.  GFR  04/19/2022 >60  >60 Final    Comment: Chronic Kidney Disease: less than 60 ml/min/1.73 sq.m. Kidney Failure: less than 15 ml/min/1.73 sq.m. Results valid for patients 18 years and older.       Calcium 04/19/2022 9.7  8.3 - 10.6 mg/dL Final    Total Protein 04/19/2022 7.5  6.4 - 8.2 g/dL Final    Albumin 04/19/2022 4.9  3.4 - 5.0 g/dL Final    Albumin/Globulin Ratio 04/19/2022 1.9  1.1 - 2.2 Final    Total Bilirubin 04/19/2022 0.4  0.0 - 1.0 mg/dL Final    Alkaline Phosphatase 04/19/2022 65  40 - 129 U/L Final    ALT 04/19/2022 37  10 - 40 U/L Final    AST 04/19/2022 21  15 - 37 U/L Final    Hemoglobin A1C 04/19/2022 5.6  See comment % Final    Comment: Comment:  Diagnosis of Diabetes: > or = 6.5%  Increased risk of diabetes (Prediabetes): 5.7-6.4%  Glycemic Control: Nonpregnant Adults: <7.0%                    Pregnant: <6.0%        eAG 04/19/2022 114.0  mg/dL Final    Microalbumin, Random Urine 04/19/2022 <1.20  <2.0 mg/dL Final    Creatinine, Ur 04/19/2022 194.6  39.0 - 259.0 mg/dL Final    Microalbumin Creatinine Ratio 04/19/2022 see below  0.0 - 30.0 mg/g Final Comment: Ratio cannot be calculated since microalbumin level is below  the lower detection limit.  PSA 04/19/2022 0.66  0.00 - 4.00 ng/mL Final       CT ABDOMEN PELVIS W IV CONTRAST Additional Contrast? Oral    Result Date: 5/13/2022  EXAMINATION: CT OF THE ABDOMEN AND PELVIS WITH CONTRAST 5/12/2022 5:09 pm TECHNIQUE: CT of the abdomen and pelvis was performed with the administration of intravenous contrast. Multiplanar reformatted images are provided for review. Automated exposure control, iterative reconstruction, and/or weight based adjustment of the mA/kV was utilized to reduce the radiation dose to as low as reasonably achievable. COMPARISON: 12/02/2017 HISTORY: ORDERING SYSTEM PROVIDED HISTORY: Left inguinal pain TECHNOLOGIST PROVIDED HISTORY: Additional Contrast?->Oral STAT Creatinine as needed:->Yes Reason for exam:->left inguinal pain, possible hernia Reason for Exam: left inguinal pain, possible hernia FINDINGS: Lower Chest: There is dependent bibasilar atelectasis. Organs: The liver, spleen, pancreas, adrenal glands and kidneys are unremarkable. No change in the punctate nonobstructing left nephrolithiasis. GI/Bowel: There is no bowel obstruction. Status post appendectomy. Pelvis: The pelvic viscera are within normal limits. There is no left inguinal hernia or mass. Peritoneum/Retroperitoneum: There is no evidence of free fluid or adenopathy. There is a small fat containing umbilical hernia. Bones/Soft Tissues: Degenerative changes involve the thoracolumbar spine. 1. No acute abnormality. ASSESSMENT:  Left inguinal pain  Reducible umbilical hernia    PLAN:  1. Umbilical hernia needs repair. During repair can laparoscopically examine left inguinal floor. If hernia present, will repair. If hernia not present, will inject local anesthetic as regional block to assess ilioinguinal nerve involvement in current pain.  If hernia not present, will need continued workup with likely orthopedics regarding left hip and inguinal pain. We discussed the risks of surgery including bleeding, infection, damage to surrounding structures, conversion to open, hernia recurrence, chronic pain as well as anesthesia related complications. Increased risk of recurrence is associated with smoking, diabetes, obesity as well as heavy lifting over 20lbs sooner than 6 weeks after the surgery. We also discussed minimally invasive vs open technique. The benefits of the procedure include repair of the hernia, prevention of future incarceration and return to normal daily activity. Alternatives discussed include close observation. Details of the procedure were discussed and all questions answered. The patient understands, agrees, and wishes to proceed with minimally invasive procedure, open umbilical hernia repair and robot-assisted laparoscopy with possible left inguinal hernia repair with mesh  2. Warning signs of an incarcerated hernia include inability to reduce the bulge, increased and constant pain, nausea, vomiting, fevers, chills and constipation. This is a surgical emergency and the patient should contact the office or present to an emergency department  3. Will schedule for open umbilical hernia repair and robot-assisted laparoscopy with possible left inguinal hernia repair with mesh with general anesthesia and as outpatient procedure  4. Will need clearance/guidance regarding management of anticoagulation, off Eliquis for 2 days prior and restart the day after if procedure uneventful    Andrae Ambriz MD, FACS  5/13/2022  1:53 PM

## 2022-05-13 NOTE — PATIENT INSTRUCTIONS
1. Umbilical hernia needs repair. During repair can laparoscopically examine left inguinal floor. If hernia present, will repair. If hernia not present, will inject local anesthetic as regional block to assess ilioinguinal nerve involvement in current pain. If hernia not present, will need continued workup with likely orthopedics regarding left hip and inguinal pain. We discussed the risks of surgery including bleeding, infection, damage to surrounding structures, conversion to open, hernia recurrence, chronic pain as well as anesthesia related complications. Increased risk of recurrence is associated with smoking, diabetes, obesity as well as heavy lifting over 20lbs sooner than 6 weeks after the surgery. We also discussed minimally invasive vs open technique. The benefits of the procedure include repair of the hernia, prevention of future incarceration and return to normal daily activity. Alternatives discussed include close observation. Details of the procedure were discussed and all questions answered. The patient understands, agrees, and wishes to proceed with minimally invasive procedure, open umbilical hernia repair and robot-assisted laparoscopy with possible left inguinal hernia repair with mesh  2. Warning signs of an incarcerated hernia include inability to reduce the bulge, increased and constant pain, nausea, vomiting, fevers, chills and constipation. This is a surgical emergency and the patient should contact the office or present to an emergency department  3. Will schedule for open umbilical hernia repair and robot-assisted laparoscopy with possible left inguinal hernia repair with mesh with general anesthesia and as outpatient procedure  4.  Will need clearance/guidance regarding management of anticoagulation, off Eliquis for 2 days prior and restart the day after if procedure uneventful

## 2022-05-17 ENCOUNTER — TELEPHONE (OUTPATIENT)
Dept: INTERNAL MEDICINE CLINIC | Age: 47
End: 2022-05-17

## 2022-05-17 ENCOUNTER — TELEPHONE (OUTPATIENT)
Dept: SURGERY | Age: 47
End: 2022-05-17

## 2022-05-17 NOTE — TELEPHONE ENCOUNTER
Spoke w/patient and advised, per Dr Bertha Sheehan, as of today, until after surgery. Spoke w/Magdalena @ Dr Gutier[facility] office and conveyed the same message.

## 2022-05-17 NOTE — TELEPHONE ENCOUNTER
Please Advise      Jayden Sal from  office would like to know if he can stop his Eliquis because it has to be stop 48 hrs before surgery     Jayden Sal [phone]

## 2022-05-18 NOTE — PROGRESS NOTES
Name_______________________________________Printed:____________________  Date and time of surgery________5/19/22 1200________________Arrival Time:_____1030 main___________   1. The instructions given regarding when and if a patient needs to stop oral intake prior to surgery varies. Follow the specific instructions you were given                  _x__Nothing to eat or to drink after Midnight the night before.                   ____Carbo loading or ERAS instructions will be given to select patients-if you have been given those instructions -please do the following                           The evening before your surgery after dinner before midnight drink 40 ounces of gatorade. If you are diabetic use sugar free. The morning of surgery drink 40 ounces of water. This needs to be finished 3 hours prior to your surgery start time. 2. Take the following pills with a small sip of water on the morning of surgery________inhalers___________________________________________                  Do not take blood pressure medications ending in pril or sartan the jenni prior to surgery or the morning of surgery_   3. Aspirin, Ibuprofen, Advil, Naproxen, Vitamin E and other Anti-inflammatory products and supplements should be stopped for 5 -7days before surgery or as directed by your physician. 4. Check with your Doctor regarding stopping Plavix, Coumadin,Eliquis, Lovenox,Effient,Pradaxa,Xarelto, Fragmin or other blood thinners and follow their instructions. 5. Do not smoke, and do not drink any alcoholic beverages 24 hours prior to surgery. This includes NA Beer. Refrain from the usage of any recreational drugs. 6. You may brush your teeth and gargle the morning of surgery. DO NOT SWALLOW WATER   7. You MUST make arrangements for a responsible adult to stay on site while you are here and take you home after your surgery. You will not be allowed to leave alone or drive yourself home.   It is strongly suggested someone stay with you the first 24 hrs. Your surgery will be cancelled if you do not have a ride home. 8. A parent/legal guardian must accompany a child scheduled for surgery and plan to stay at the hospital until the child is discharged. Please do not bring other children with you. 9. Please wear simple, loose fitting clothing to the hospital.  Azar Aguilera not bring valuables (money, credit cards, checkbooks, etc.) Do not wear any makeup (including no eye makeup) or nail polish on your fingers or toes. 10. DO NOT wear any jewelry or piercings on day of surgery. All body piercing jewelry must be removed. 11. If you have ___dentures, they will be removed before going to the OR; we will provide you a container. If you wear ___contact lenses or ___glasses, they will be removed; please bring a case for them. 12. Please see your family doctor/pediatrician for a history & physical and/or concerning medications. Bring any test results/reports from your physician's office. PCP_______x___________Phone___________H&P Appt. Date________             13 If you  have a Living Will and Durable Power of  for Healthcare, please bring in a copy. 15. Notify your Surgeon if you develop any illness between now and surgery  time, cough, cold, fever, sore throat, nausea, vomiting, etc.  Please notify your surgeon if you experience dizziness, shortness of breath or blurred vision between now & the time of your surgery             15. DO NOT shave your operative site 96 hours prior to surgery. For face & neck surgery, men may use an electric razor 48 hours prior to surgery. 16. Shower the night before or morning of surgery using an antibacterial soap or as you have been instructed. 17. To provide excellent care visitors will be limited to one in the room at any given time. 18.  Please bring picture ID and insurance card.              19.  Visit our web site for additional information:  [email or web address]              20.During flu season no children under the age of 15 are permitted in the hospital for the safety of all patients. 21. If you take a long acting insulin in the evening only  take half of your usual  dose the night  before your procedure              22. If you use a c-pap please bring DOS if staying overnight,             23.For your convenience [facility] has a pharmacy on site to fill your prescriptions. 24. If you use oxygen and have a portable tank please bring it  with you the DOS             25. Bring a complete list of all your medications with name and dose include any supplements. 26. Other__________________________________________   *Please call pre admission testing if you any further questions   Bella Quevedo   [address]    [place] [id number]. [place]  [phone]   [address] [place] [address]       VISITOR POLICY(subject to change)    Current policy is 2 visitors per patient. No children. A mask is required. Visiting hours are 8a-8p. Overnight visitors will be at the discretion of the nurse. All above information reviewed with patient in person or by phone. Patient verbalizes understanding. All questions and concerns addressed.                                                                                                  Patient/Rep________pt____________                                                                                                                                    PRE OP INSTRUCTIONS

## 2022-05-19 ENCOUNTER — ANESTHESIA (OUTPATIENT)
Dept: OPERATING ROOM | Age: 47
End: 2022-05-19
Payer: COMMERCIAL

## 2022-05-19 ENCOUNTER — ANESTHESIA EVENT (OUTPATIENT)
Dept: OPERATING ROOM | Age: 47
End: 2022-05-19
Payer: COMMERCIAL

## 2022-05-19 ENCOUNTER — HOSPITAL ENCOUNTER (OUTPATIENT)
Age: 47
Setting detail: OUTPATIENT SURGERY
Discharge: HOME | End: 2022-05-19
Attending: SURGERY | Admitting: SURGERY
Payer: COMMERCIAL

## 2022-05-19 VITALS
TEMPERATURE: 97 F | HEART RATE: 51 BPM | OXYGEN SATURATION: 94 % | HEIGHT: 78 IN | WEIGHT: 256.1 LBS | DIASTOLIC BLOOD PRESSURE: 90 MMHG | RESPIRATION RATE: 13 BRPM | SYSTOLIC BLOOD PRESSURE: 130 MMHG | BODY MASS INDEX: 29.63 KG/M2

## 2022-05-19 DIAGNOSIS — K42.9 UMBILICAL HERNIA WITHOUT OBSTRUCTION AND WITHOUT GANGRENE: Primary | ICD-10-CM

## 2022-05-19 PROCEDURE — 2500000003 HC RX 250 WO HCPCS: Performed by: NURSE ANESTHETIST, CERTIFIED REGISTERED

## 2022-05-19 PROCEDURE — 2580000003 HC RX 258: Performed by: SURGERY

## 2022-05-19 PROCEDURE — 6360000002 HC RX W HCPCS: Performed by: NURSE ANESTHETIST, CERTIFIED REGISTERED

## 2022-05-19 PROCEDURE — 7100000010 HC PHASE II RECOVERY - FIRST 15 MIN: Performed by: SURGERY

## 2022-05-19 PROCEDURE — 3600000009 HC SURGERY ROBOT BASE: Performed by: SURGERY

## 2022-05-19 PROCEDURE — 2709999900 HC NON-CHARGEABLE SUPPLY: Performed by: SURGERY

## 2022-05-19 PROCEDURE — A4217 STERILE WATER/SALINE, 500 ML: HCPCS | Performed by: SURGERY

## 2022-05-19 PROCEDURE — 6360000002 HC RX W HCPCS: Performed by: SURGERY

## 2022-05-19 PROCEDURE — 6360000002 HC RX W HCPCS: Performed by: ANESTHESIOLOGY

## 2022-05-19 PROCEDURE — 3700000000 HC ANESTHESIA ATTENDED CARE: Performed by: SURGERY

## 2022-05-19 PROCEDURE — 7100000001 HC PACU RECOVERY - ADDTL 15 MIN: Performed by: SURGERY

## 2022-05-19 PROCEDURE — 3600000019 HC SURGERY ROBOT ADDTL 15MIN: Performed by: SURGERY

## 2022-05-19 PROCEDURE — 49585 REPAIR UMBILICAL HERN,5+Y/O,REDUC: CPT | Performed by: SURGERY

## 2022-05-19 PROCEDURE — 7100000011 HC PHASE II RECOVERY - ADDTL 15 MIN: Performed by: SURGERY

## 2022-05-19 PROCEDURE — S2900 ROBOTIC SURGICAL SYSTEM: HCPCS | Performed by: SURGERY

## 2022-05-19 PROCEDURE — 6370000000 HC RX 637 (ALT 250 FOR IP)

## 2022-05-19 PROCEDURE — 2500000003 HC RX 250 WO HCPCS: Performed by: SURGERY

## 2022-05-19 PROCEDURE — 7100000000 HC PACU RECOVERY - FIRST 15 MIN: Performed by: SURGERY

## 2022-05-19 PROCEDURE — 3700000001 HC ADD 15 MINUTES (ANESTHESIA): Performed by: SURGERY

## 2022-05-19 RX ORDER — SUCCINYLCHOLINE/SOD CL,ISO/PF 200MG/10ML
SYRINGE (ML) INTRAVENOUS PRN
Status: DISCONTINUED | OUTPATIENT
Start: 2022-05-19 | End: 2022-05-19 | Stop reason: SDUPTHER

## 2022-05-19 RX ORDER — DEXAMETHASONE SODIUM PHOSPHATE 4 MG/ML
INJECTION, SOLUTION INTRA-ARTICULAR; INTRALESIONAL; INTRAMUSCULAR; INTRAVENOUS; SOFT TISSUE PRN
Status: DISCONTINUED | OUTPATIENT
Start: 2022-05-19 | End: 2022-05-19 | Stop reason: SDUPTHER

## 2022-05-19 RX ORDER — DEXMEDETOMIDINE HYDROCHLORIDE 100 UG/ML
INJECTION, SOLUTION INTRAVENOUS PRN
Status: DISCONTINUED | OUTPATIENT
Start: 2022-05-19 | End: 2022-05-19 | Stop reason: SDUPTHER

## 2022-05-19 RX ORDER — SODIUM CHLORIDE 0.9 % (FLUSH) 0.9 %
5-40 SYRINGE (ML) INJECTION PRN
Status: DISCONTINUED | OUTPATIENT
Start: 2022-05-19 | End: 2022-05-19 | Stop reason: HOSPADM

## 2022-05-19 RX ORDER — KETOROLAC TROMETHAMINE 30 MG/ML
INJECTION, SOLUTION INTRAMUSCULAR; INTRAVENOUS PRN
Status: DISCONTINUED | OUTPATIENT
Start: 2022-05-19 | End: 2022-05-19 | Stop reason: SDUPTHER

## 2022-05-19 RX ORDER — FENTANYL CITRATE 50 UG/ML
25 INJECTION, SOLUTION INTRAMUSCULAR; INTRAVENOUS ONCE
Status: COMPLETED | OUTPATIENT
Start: 2022-05-19 | End: 2022-05-19

## 2022-05-19 RX ORDER — OXYCODONE HYDROCHLORIDE 5 MG/1
TABLET ORAL
Status: COMPLETED
Start: 2022-05-19 | End: 2022-05-19

## 2022-05-19 RX ORDER — FENTANYL CITRATE 50 UG/ML
INJECTION, SOLUTION INTRAMUSCULAR; INTRAVENOUS PRN
Status: DISCONTINUED | OUTPATIENT
Start: 2022-05-19 | End: 2022-05-19 | Stop reason: SDUPTHER

## 2022-05-19 RX ORDER — ROCURONIUM BROMIDE 10 MG/ML
INJECTION, SOLUTION INTRAVENOUS PRN
Status: DISCONTINUED | OUTPATIENT
Start: 2022-05-19 | End: 2022-05-19 | Stop reason: SDUPTHER

## 2022-05-19 RX ORDER — GLYCOPYRROLATE 1 MG/5 ML
SYRINGE (ML) INTRAVENOUS PRN
Status: DISCONTINUED | OUTPATIENT
Start: 2022-05-19 | End: 2022-05-19 | Stop reason: SDUPTHER

## 2022-05-19 RX ORDER — LIDOCAINE HYDROCHLORIDE 20 MG/ML
INJECTION, SOLUTION EPIDURAL; INFILTRATION; INTRACAUDAL; PERINEURAL PRN
Status: DISCONTINUED | OUTPATIENT
Start: 2022-05-19 | End: 2022-05-19 | Stop reason: SDUPTHER

## 2022-05-19 RX ORDER — MAGNESIUM HYDROXIDE 1200 MG/15ML
LIQUID ORAL CONTINUOUS PRN
Status: COMPLETED | OUTPATIENT
Start: 2022-05-19 | End: 2022-05-19

## 2022-05-19 RX ORDER — ONDANSETRON 2 MG/ML
INJECTION INTRAMUSCULAR; INTRAVENOUS PRN
Status: DISCONTINUED | OUTPATIENT
Start: 2022-05-19 | End: 2022-05-19 | Stop reason: SDUPTHER

## 2022-05-19 RX ORDER — MAGNESIUM SULFATE HEPTAHYDRATE 500 MG/ML
INJECTION, SOLUTION INTRAMUSCULAR; INTRAVENOUS PRN
Status: DISCONTINUED | OUTPATIENT
Start: 2022-05-19 | End: 2022-05-19 | Stop reason: SDUPTHER

## 2022-05-19 RX ORDER — SODIUM CHLORIDE 9 MG/ML
INJECTION, SOLUTION INTRAVENOUS PRN
Status: DISCONTINUED | OUTPATIENT
Start: 2022-05-19 | End: 2022-05-19 | Stop reason: HOSPADM

## 2022-05-19 RX ORDER — ONDANSETRON 2 MG/ML
4 INJECTION INTRAMUSCULAR; INTRAVENOUS
Status: DISCONTINUED | OUTPATIENT
Start: 2022-05-19 | End: 2022-05-19 | Stop reason: HOSPADM

## 2022-05-19 RX ORDER — PROPOFOL 10 MG/ML
INJECTION, EMULSION INTRAVENOUS PRN
Status: DISCONTINUED | OUTPATIENT
Start: 2022-05-19 | End: 2022-05-19 | Stop reason: SDUPTHER

## 2022-05-19 RX ORDER — OXYCODONE HYDROCHLORIDE 5 MG/1
5 TABLET ORAL EVERY 4 HOURS PRN
Qty: 20 TABLET | Refills: 0 | Status: SHIPPED | OUTPATIENT
Start: 2022-05-19 | End: 2022-05-24 | Stop reason: SDUPTHER

## 2022-05-19 RX ORDER — SODIUM CHLORIDE 0.9 % (FLUSH) 0.9 %
5-40 SYRINGE (ML) INJECTION EVERY 12 HOURS SCHEDULED
Status: DISCONTINUED | OUTPATIENT
Start: 2022-05-19 | End: 2022-05-19 | Stop reason: HOSPADM

## 2022-05-19 RX ORDER — MIDAZOLAM HYDROCHLORIDE 1 MG/ML
INJECTION INTRAMUSCULAR; INTRAVENOUS PRN
Status: DISCONTINUED | OUTPATIENT
Start: 2022-05-19 | End: 2022-05-19 | Stop reason: SDUPTHER

## 2022-05-19 RX ORDER — HYDROMORPHONE HCL 110MG/55ML
PATIENT CONTROLLED ANALGESIA SYRINGE INTRAVENOUS PRN
Status: DISCONTINUED | OUTPATIENT
Start: 2022-05-19 | End: 2022-05-19 | Stop reason: SDUPTHER

## 2022-05-19 RX ORDER — HYDROMORPHONE HCL 110MG/55ML
0.25 PATIENT CONTROLLED ANALGESIA SYRINGE INTRAVENOUS EVERY 5 MIN PRN
Status: DISCONTINUED | OUTPATIENT
Start: 2022-05-19 | End: 2022-05-19 | Stop reason: HOSPADM

## 2022-05-19 RX ORDER — BUPIVACAINE HYDROCHLORIDE 5 MG/ML
INJECTION, SOLUTION EPIDURAL; INTRACAUDAL
Status: COMPLETED | OUTPATIENT
Start: 2022-05-19 | End: 2022-05-19

## 2022-05-19 RX ORDER — HYDROMORPHONE HCL 110MG/55ML
0.5 PATIENT CONTROLLED ANALGESIA SYRINGE INTRAVENOUS EVERY 5 MIN PRN
Status: DISCONTINUED | OUTPATIENT
Start: 2022-05-19 | End: 2022-05-19 | Stop reason: HOSPADM

## 2022-05-19 RX ORDER — DIPHENHYDRAMINE HYDROCHLORIDE 50 MG/ML
12.5 INJECTION INTRAMUSCULAR; INTRAVENOUS
Status: DISCONTINUED | OUTPATIENT
Start: 2022-05-19 | End: 2022-05-19 | Stop reason: HOSPADM

## 2022-05-19 RX ORDER — MEPERIDINE HYDROCHLORIDE 25 MG/ML
12.5 INJECTION INTRAMUSCULAR; INTRAVENOUS; SUBCUTANEOUS EVERY 5 MIN PRN
Status: DISCONTINUED | OUTPATIENT
Start: 2022-05-19 | End: 2022-05-19 | Stop reason: HOSPADM

## 2022-05-19 RX ORDER — OXYCODONE HYDROCHLORIDE 5 MG/1
5 TABLET ORAL ONCE
Status: COMPLETED | OUTPATIENT
Start: 2022-05-19 | End: 2022-05-19

## 2022-05-19 RX ADMIN — ROCURONIUM BROMIDE 40 MG: 10 INJECTION, SOLUTION INTRAVENOUS at 12:18

## 2022-05-19 RX ADMIN — SUGAMMADEX 100 MG: 100 INJECTION, SOLUTION INTRAVENOUS at 12:47

## 2022-05-19 RX ADMIN — PROPOFOL 50 MG: 10 INJECTION, EMULSION INTRAVENOUS at 12:06

## 2022-05-19 RX ADMIN — LIDOCAINE HYDROCHLORIDE 40 MG: 20 INJECTION, SOLUTION EPIDURAL; INFILTRATION; INTRACAUDAL; PERINEURAL at 12:05

## 2022-05-19 RX ADMIN — DEXAMETHASONE SODIUM PHOSPHATE 12 MG: 4 INJECTION, SOLUTION INTRAMUSCULAR; INTRAVENOUS at 12:16

## 2022-05-19 RX ADMIN — ROCURONIUM BROMIDE 10 MG: 10 INJECTION, SOLUTION INTRAVENOUS at 12:31

## 2022-05-19 RX ADMIN — CEFAZOLIN SODIUM 2000 MG: 10 INJECTION, POWDER, FOR SOLUTION INTRAVENOUS at 11:58

## 2022-05-19 RX ADMIN — MIDAZOLAM 2 MG: 1 INJECTION INTRAMUSCULAR; INTRAVENOUS at 11:55

## 2022-05-19 RX ADMIN — SUGAMMADEX 100 MG: 100 INJECTION, SOLUTION INTRAVENOUS at 12:59

## 2022-05-19 RX ADMIN — SODIUM CHLORIDE: 9 INJECTION, SOLUTION INTRAVENOUS at 12:44

## 2022-05-19 RX ADMIN — FENTANYL CITRATE 50 MCG: 50 INJECTION, SOLUTION INTRAMUSCULAR; INTRAVENOUS at 12:02

## 2022-05-19 RX ADMIN — OXYCODONE HYDROCHLORIDE 5 MG: 5 TABLET ORAL at 14:54

## 2022-05-19 RX ADMIN — SUGAMMADEX 100 MG: 100 INJECTION, SOLUTION INTRAVENOUS at 13:02

## 2022-05-19 RX ADMIN — Medication 0.2 MG: at 12:16

## 2022-05-19 RX ADMIN — HYDROMORPHONE HYDROCHLORIDE 0.5 MG: 2 INJECTION, SOLUTION INTRAMUSCULAR; INTRAVENOUS; SUBCUTANEOUS at 12:42

## 2022-05-19 RX ADMIN — DEXMEDETOMIDINE HYDROCHLORIDE 10 MCG: 100 INJECTION, SOLUTION INTRAVENOUS at 12:15

## 2022-05-19 RX ADMIN — SODIUM CHLORIDE: 9 INJECTION, SOLUTION INTRAVENOUS at 11:55

## 2022-05-19 RX ADMIN — MAGNESIUM SULFATE HEPTAHYDRATE 1 G: 500 INJECTION, SOLUTION INTRAMUSCULAR; INTRAVENOUS at 12:16

## 2022-05-19 RX ADMIN — SUGAMMADEX 100 MG: 100 INJECTION, SOLUTION INTRAVENOUS at 12:44

## 2022-05-19 RX ADMIN — Medication 160 MG: at 12:05

## 2022-05-19 RX ADMIN — FENTANYL CITRATE 50 MCG: 50 INJECTION, SOLUTION INTRAMUSCULAR; INTRAVENOUS at 12:05

## 2022-05-19 RX ADMIN — ROCURONIUM BROMIDE 10 MG: 10 INJECTION, SOLUTION INTRAVENOUS at 12:05

## 2022-05-19 RX ADMIN — PROPOFOL 300 MG: 10 INJECTION, EMULSION INTRAVENOUS at 12:05

## 2022-05-19 RX ADMIN — DEXMEDETOMIDINE HYDROCHLORIDE 10 MCG: 100 INJECTION, SOLUTION INTRAVENOUS at 12:24

## 2022-05-19 RX ADMIN — SUGAMMADEX 100 MG: 100 INJECTION, SOLUTION INTRAVENOUS at 12:52

## 2022-05-19 RX ADMIN — ONDANSETRON 4 MG: 2 INJECTION INTRAMUSCULAR; INTRAVENOUS at 12:16

## 2022-05-19 RX ADMIN — KETOROLAC TROMETHAMINE 15 MG: 30 INJECTION, SOLUTION INTRAMUSCULAR; INTRAVENOUS at 12:42

## 2022-05-19 RX ADMIN — FENTANYL CITRATE 25 MCG: 0.05 INJECTION, SOLUTION INTRAMUSCULAR; INTRAVENOUS at 14:10

## 2022-05-19 ASSESSMENT — PAIN SCALES - GENERAL
PAINLEVEL_OUTOF10: 10
PAINLEVEL_OUTOF10: 10

## 2022-05-19 ASSESSMENT — PAIN - FUNCTIONAL ASSESSMENT: PAIN_FUNCTIONAL_ASSESSMENT: NONE - DENIES PAIN

## 2022-05-19 ASSESSMENT — PAIN DESCRIPTION - LOCATION
LOCATION: ABDOMEN
LOCATION: ABDOMEN

## 2022-05-19 ASSESSMENT — PAIN DESCRIPTION - PAIN TYPE
TYPE: SURGICAL PAIN
TYPE: SURGICAL PAIN

## 2022-05-19 NOTE — PROGRESS NOTES
Discussed discharge paperwork and new medication prescription with pt and pts friend- both verbalized understanding. Removed both PIV with no complications and wheeled pt to front lobby to be taken home by friend.  All questions answered at this time

## 2022-05-19 NOTE — H&P
[facility]    I have reviewed the history and physical and examined the patient and find no relevant changes. I have reviewed with the patient and/or family the risks, benefits, and alternatives to the procedure. Vignesh Peña 6  Jose Rafael Jones MD, FACS  5/19/2022  11:23 AM

## 2022-05-19 NOTE — ANESTHESIA PRE PROCEDURE
Department of Anesthesiology  Preprocedure Note       Name:  Sunny Stevenson   Age:  51 y.o.  :  1975                                          MRN:  [id number]         Date:  2022      Surgeon: Natividad Pierce):  MD Deysi Smith MD    Procedure: Procedure(s):  OPEN UMBILICAL HERNIA REPAIR  ROBOT ASSISTED LAPAROSCOPIC WITH POSSIBLE LEFT INGUINAL HERNIA REPAIR WITH MESH    Medications prior to admission:   Prior to Admission medications    Medication Sig Start Date End Date Taking? Authorizing Provider   cyclobenzaprine (FLEXERIL) 10 MG tablet TAKE 1 TABLET BY MOUTH THREE TIMES A DAY AS NEEDED FOR MUSCLE SPASMS 22   Sharon Gooden MD   Apixaban (ELIQUIS PO) Take by mouth    Historical Provider, MD   lactobacillus (CULTURELLE) CAPS capsule Take 1 capsule by mouth daily 22   Sharon Gooden MD   amphetamine-dextroamphetamine (ADDERALL XR) 15 MG extended release capsule Take 1 capsule by mouth daily for 30 days. 22  Sharon Gooden MD   citalopram (CELEXA) 20 MG tablet TAKE 1 TABLET BY MOUTH EVERY DAY 22   Sharon Gooden MD   pramipexole (MIRAPEX) 0.25 MG tablet TAKE 1 TO 2 TABLETS BY MOUTH AT BEDTIME 22   Catalina Whitfield MD   Budeson-Glycopyrrol-Formoterol (BREZTRI AEROSPHERE) 160-9-4.8 MCG/ACT AERO Inhale 2 puffs into the lungs 2 times daily 3/21/22   Sharon Gooden MD   Budeson-Glycopyrrol-Formoterol (BREZTRI AEROSPHERE) 160-9-4.8 MCG/ACT AERO Inhale 2 puffs into the lungs 2 times daily 3/21/22   Sharon Gooden MD   valACYclovir (VALTREX) 500 MG tablet Take 1 tablet by mouth daily 21   Sharon Gooden MD   amphetamine-dextroamphetamine (ADDERALL XR) 15 MG extended release capsule Take 1 capsule by mouth daily for 30 days.  21  Sharon Gooden MD   albuterol sulfate HFA (PROVENTIL HFA) 108 (90 Base) MCG/ACT inhaler Inhale 2 puffs into the lungs every 4 hours as needed for Wheezing or Shortness of Breath 1/6/21 4/19/22  Tangela Thacker MD       Current medications:    Current Outpatient Medications   Medication Sig Dispense Refill    cyclobenzaprine (FLEXERIL) 10 MG tablet TAKE 1 TABLET BY MOUTH THREE TIMES A DAY AS NEEDED FOR MUSCLE SPASMS 60 tablet 0    Apixaban (ELIQUIS PO) Take by mouth      lactobacillus (CULTURELLE) CAPS capsule Take 1 capsule by mouth daily 30 capsule 3    amphetamine-dextroamphetamine (ADDERALL XR) 15 MG extended release capsule Take 1 capsule by mouth daily for 30 days. 30 capsule 0    citalopram (CELEXA) 20 MG tablet TAKE 1 TABLET BY MOUTH EVERY DAY 90 tablet 2    pramipexole (MIRAPEX) 0.25 MG tablet TAKE 1 TO 2 TABLETS BY MOUTH AT BEDTIME 180 tablet 0    Budeson-Glycopyrrol-Formoterol (BREZTRI AEROSPHERE) 160-9-4.8 MCG/ACT AERO Inhale 2 puffs into the lungs 2 times daily 2 each 0    Budeson-Glycopyrrol-Formoterol (BREZTRI AEROSPHERE) 160-9-4.8 MCG/ACT AERO Inhale 2 puffs into the lungs 2 times daily 10.7 g 11    valACYclovir (VALTREX) 500 MG tablet Take 1 tablet by mouth daily 30 tablet 5    amphetamine-dextroamphetamine (ADDERALL XR) 15 MG extended release capsule Take 1 capsule by mouth daily for 30 days. 30 capsule 0    albuterol sulfate HFA (PROVENTIL HFA) 108 (90 Base) MCG/ACT inhaler Inhale 2 puffs into the lungs every 4 hours as needed for Wheezing or Shortness of Breath 1 Inhaler 11     No current facility-administered medications for this visit.        Allergies:  No Known Allergies    Problem List:    Patient Active Problem List   Diagnosis Code    Chronic tension headaches G44.229    Gastritis K29.70    Insomnia G47.00    Bilateral pulmonary embolism (HCC) I26.99    Chest pain varying with breathing R07.1    Pulmonary infarct (HCC) I26.99    Moderate episode of recurrent major depressive disorder (HCC) F33.1    BIJAL (generalized anxiety disorder) F41.1    Internal hemorrhoid K64.8    Obstructive sleep apnea syndrome G47.33    RLS (restless legs syndrome) G25.81    Left inguinal pain R10.32       Past Medical History:        Diagnosis Date    Asthma     since P.E.    Collar bone fracture     COVID     BIJAL (generalized anxiety disorder)     Hx of blood clots     Migraine     Moderate episode of recurrent major depressive disorder ([facility] [address] 2018    Obstructive sleep apnea syndrome 2021    Prolonged emergence from general anesthesia     Pulmonary emboli ([facility] [address]     ,     Sleep apnea     just dx'ed; getting CPAP       Past Surgical History:        Procedure Laterality Date    APPENDECTOMY  2019    COLONOSCOPY N/A 3/8/2021    COLONOSCOPY WITH ANESTHESIA -SLEEP APNEA- performed by Nila Meckel, MD at [address] [place] [address] Se Left     HERNIA REPAIR Right 2009    inguinal       Social History:    Social History     Tobacco Use    Smoking status: Former Smoker     Packs/day: 0.50     Years: 25.00     Pack years: 12.50     Types: Cigarettes     Start date: 1989     Quit date: 2015     Years since quittin.7    Smokeless tobacco: Never Used    Tobacco comment: started to smoke at 12 / smoked up to 1 to 1.5 p.p.d / quit smoking 2015   Substance Use Topics    Alcohol use: Yes     Alcohol/week: 2.0 standard drinks     Types: 2 Cans of beer per week     Comment: socially                                Counseling given: Not Answered  Comment: started to smoke at 14 / smoked up to 1 to 1.5 p.p.d / quit smoking 2015      Vital Signs (Current): There were no vitals filed for this visit.                                            BP Readings from Last 3 Encounters:   22 120/72   22 118/78   22 130/82       NPO Status:                                                                                 BMI:   Wt Readings from Last 3 Encounters:   22 256 lb (116.1 kg)   22 260 lb (117.9 kg)   22 257 lb (116.6 kg)     There is no height or weight on file to calculate BMI.    CBC:   Lab Results   Component Value Date    WBC 3.7 02/25/2019    RBC 5.03 02/25/2019    HGB 14.9 02/25/2019    HCT 44.0 02/25/2019    MCV 87.4 02/25/2019    RDW 14.1 02/25/2019     02/25/2019       CMP:   Lab Results   Component Value Date     04/19/2022    K 4.7 04/19/2022     04/19/2022    CO2 26 04/19/2022    BUN 14 04/19/2022    CREATININE 1.3 04/19/2022    GFRAA >60 04/19/2022    GFRAA >60 10/29/2010    AGRATIO 1.9 04/19/2022    LABGLOM 59 04/19/2022    GLUCOSE 102 04/19/2022    PROT 7.5 04/19/2022    PROT 7.4 10/29/2010    CALCIUM 9.7 04/19/2022    BILITOT 0.4 04/19/2022    ALKPHOS 65 04/19/2022    AST 21 04/19/2022    ALT 37 04/19/2022       POC Tests: No results for input(s): POCGLU, POCNA, POCK, POCCL, POCBUN, POCHEMO, POCHCT in the last 72 hours.     Coags:   Lab Results   Component Value Date    PROTIME 13.5 12/02/2017    INR 1.19 12/02/2017    APTT 21.3 06/29/2017       HCG (If Applicable): No results found for: PREGTESTUR, PREGSERUM, HCG, HCGQUANT     ABGs: No results found for: PHART, PO2ART, RUJ7XNV, LZN2EKS, BEART, A5TPGFOT     Type & Screen (If Applicable):  No results found for: LABABO, LABRH    Drug/Infectious Status (If Applicable):  No results found for: HIV, HEPCAB    COVID-19 Screening (If Applicable):   Lab Results   Component Value Date    COVID19 Not Detected 03/05/2021         Anesthesia Evaluation  Patient summary reviewed and Nursing notes reviewed no history of anesthetic complications:   Airway: Mallampati: III  TM distance: >3 FB   Neck ROM: full  Mouth opening: > = 3 FB Dental: normal exam         Pulmonary:Negative Pulmonary ROS and normal exam  breath sounds clear to auscultation  (+) sleep apnea:  asthma:                            Cardiovascular:Negative CV ROS  Exercise tolerance: good (>4 METS),           Rhythm: regular  Rate: normal                    Neuro/Psych:   Negative Neuro/Psych ROS  (+) headaches:, psychiatric history: GI/Hepatic/Renal: Neg GI/Hepatic/Renal ROS            Endo/Other: Negative Endo/Other ROS                    Abdominal:             Vascular: negative vascular ROS.  + PE. Other Findings:          Pre-Operative Diagnosis: No admission diagnoses are documented for this encounter. 51 y.o.   BMI:  There is no height or weight on file to calculate BMI. There were no vitals filed for this visit. No Known Allergies    Social History     Tobacco Use    Smoking status: Former Smoker     Packs/day: 0.50     Years: 25.00     Pack years: 12.50     Types: Cigarettes     Start date: 1989     Quit date: 2015     Years since quittin.7    Smokeless tobacco: Never Used    Tobacco comment: started to smoke at 12 / smoked up to 1 to 1.5 p.p.d / quit smoking 2015   Substance Use Topics    Alcohol use: Yes     Alcohol/week: 2.0 standard drinks     Types: 2 Cans of beer per week     Comment: socially       LABS:    CBC  Lab Results   Component Value Date/Time    WBC 3.7 (L) 2019 08:32 PM    HGB 14.9 2019 08:32 PM    HCT 44.0 2019 08:32 PM     (L) 2019 08:32 PM     RENAL  Lab Results   Component Value Date/Time     2022 10:18 AM    K 4.7 2022 10:18 AM     2022 10:18 AM    CO2 26 2022 10:18 AM    BUN 14 2022 10:18 AM    CREATININE 1.3 2022 10:18 AM    GLUCOSE 102 (H) 2022 10:18 AM     COAGS  Lab Results   Component Value Date/Time    PROTIME 13.5 (H) 2017 07:32 PM    INR 1.19 (H) 2017 07:32 PM    APTT 21.3 2017 02:12 AM     EKG 19  Normal sinus rhythmSeptal infarct , age undeterminedAbnormal ECGConfirmed by Renny Gauthier ([id number]) on 2019 4:48:06 PM    18 echo   Summary   Normal left ventricle size, wall thickness, and systolic function with an   estimated ejection fraction of 50-55%. No regional wall motion abnormalities are seen. Normal diastolic function.    Mild mitral regurgitation is present. Mild tricuspid regurgitation. Estimated pulmonary artery systolic pressure is normal at 23 mmHg assuming a   right atrial pressure of 3 mmHg. Anesthesia Plan      general     ASA 2       Induction: intravenous and rapid sequence. MIPS: Postoperative opioids intended, Prophylactic antiemetics administered and Postoperative trial extubation. Anesthetic plan and risks discussed with patient. Plan discussed with CRNA.                   Funmilayo Vega MD   5/19/2022

## 2022-05-19 NOTE — PROGRESS NOTES
Pt resting in bed with eyes closed- responds to voice. VSS. x1 surgical incision to abdomen CDI. PO pain pill give to pt for pain, denies nausea.  Phase one criteria met, will transition to phase two

## 2022-05-19 NOTE — ANESTHESIA POSTPROCEDURE EVALUATION
Department of Anesthesiology  Postprocedure Note    Patient: Patrick Martinez  MRN: [id number]  YOB: 1975  Date of evaluation: 5/19/2022  Time:  1:24 PM     Procedure Summary     Date: 05/19/22 Room / Location: [facility] OR [address] [place] [address]    Anesthesia Start: 1200 Anesthesia Stop: 0069    Procedures:       OPEN UMBILICAL HERNIA REPAIR (N/A Abdomen)      ROBOT ASSISTED LAPAROSCOPY (N/A Abdomen) Diagnosis:       (H65.7 REDUCIBLE UMBILICAL HERNIA)      (S36.76 LEFT INGUINAL PAIN)    Surgeons: Shira Burt MD Responsible Provider: Rochelle Connolly MD    Anesthesia Type: general ASA Status: 2          Anesthesia Type: No value filed. Mireya Phase I: Mireya Score: 8    Mireya Phase II:      Last vitals: Reviewed and per EMR flowsheets.        Anesthesia Post Evaluation    Patient location during evaluation: PACU  Patient participation: complete - patient participated  Level of consciousness: awake and alert  Pain score: 2  Airway patency: patent  Nausea & Vomiting: no vomiting  Complications: no  Cardiovascular status: blood pressure returned to baseline  Respiratory status: acceptable  Hydration status: euvolemic  Multimodal analgesia pain management approach

## 2022-05-19 NOTE — PROGRESS NOTES
CLINICAL PHARMACY NOTE: MEDS TO BEDS    Total # of Prescriptions Filled: 1   The following medications were delivered to the patient:  · Oxycodone 5    Additional Documentation:  Jodee Nolasco, patient girlfriend signed for script in op pharmacy

## 2022-05-19 NOTE — PROGRESS NOTES
Pt to PACU from OR, arouses to pain. VSS. x1 surgical incision to abdomen CDI, ice and abdominal binder applied.  Will continue to monitor

## 2022-05-20 NOTE — OP NOTE
[address]                     [facility], [facility]                                OPERATIVE REPORT    PATIENT NAME: Peri Luke                      :        1975  MED REC NO:   [id number]                          ROOM:  ACCOUNT NO:   [de-identified]                           ADMIT DATE: 2022  PROVIDER:     Zackary Severin, MD    DATE OF PROCEDURE:  2022    PREOPERATIVE DIAGNOSES:  Reducible umbilical hernia and left inguinal  pain. POSTOPERATIVE DIAGNOSES:  Reducible umbilical hernia and left inguinal  pain. OPERATION PERFORMED:  Open primary umbilical hernia repair and  robot-assisted diagnostic laparoscopy. SURGEON:  Zackary Severin, MD    ANESTHESIA:  General.    INDICATIONS:  This is a 58-year-old male who presented to my office  primarily with left inguinal pain. The patient was not found to have an  inguinal hernia on exam and CT also does not show evidence of left  inguinal hernia, but the patient had continued pain. On exam, the  patient was also found to have a reducible umbilical hernia that would  benefit from repair. The hernia was small enough that a primary repair  would likely suffice, but as the patient would already be under  anesthesia, it seemed appropriate to perform a diagnostic laparoscopy  with possible robot-assisted laparoscopic repair of the left inguinal  hernia should one be present and if not, this would help guide further  treatment. The risks, benefits, and alternative treatments of this were  all discussed with the patient. Details of the procedure were  discussed. All questions were answered. The patient understood,  agreed, and wished to proceed. OPERATIVE PROCEDURE:  The patient was brought to the operative suite and  laid in supine position. General anesthesia was induced and well  tolerated. The patient's abdomen was prepped and draped in the usual  sterile fashion.   After a proper timeout was performed, verifying  operative site, procedure and patient, a curvilinear infraumbilical  incision was made with the scalpel. This was deepened through the  subcutaneous tissue down to the level of the fascia with cautery. The  umbilicus was encircled with a Angela clamp and the umbilical skin  carefully  from the hernia sac with cautery. The hernia defect  was between 1 and 2 cm. This was closed primarily with multiple  interrupted 0 Ethibond sutures. The final stitch was not tied which  allowed placing an 8 mm robotic trocar through this incision into the  abdominal cavity. The peritoneal cavity was then insufflated with  carbon dioxide at a pressure of 15 mmHg, which was well tolerated. The  patient was then positioned and the scope inserted. The patient's right  groin was inspected. There was evidence of the prior mesh, but no  evidence of recurrent hernia on the right side. On the left, the  patient did have some adhesions where the sigmoid was somewhat stuck to  the anterior abdominal wall in the pelvis, but there was no evidence of  inguinal hernia. I felt the risk outweighed the benefits of taking down  the adhesions from the sigmoid colon to the left inguinal floor, but as  there was no evidence of hernia I did not perform a hernia repair. I  did inject local anesthetic a centimeter medial and inferior to the  anterior superior iliac spine to create a regional block. Should this  help the patient's postoperative pain, he would likely need workup for  nerve involvement as this could be causing the pain down his right groin  and left scrotum. Should this not help with his pain, he would need  orthopedic evaluation to evaluate hip etiology as well as continued  workup with Urology. The abdomen was then allowed to desufflate and the  trocar was removed under direct vision. The remainder of the fascial  defects were closed with 0 Ethibond sutures.   The umbilicus was tacked  to the anterior abdominal wall with multiple interrupted 3-0 Vicryl  sutures. The wound was inspected for hemostasis and the deep tissue was  then closed with 3-0 and then the skin with 4-0 suture. The wound was  then cleaned and dressed with Dermabond. The patient tolerated the  procedure well and was transported to the PACU in stable condition. SPECIMENS:  None. DRAINS:  None. COMPLICATIONS:  None. BLOOD LOSS:  Less than 50 mL.         Lexi Saxena MD    D: 05/19/2022 13:37:38       T: 05/20/2022 1:06:18     [id number]  Job#: [id number]     Doc#: [id number]    CC:

## 2022-05-24 ENCOUNTER — OFFICE VISIT (OUTPATIENT)
Dept: INTERNAL MEDICINE CLINIC | Age: 47
End: 2022-05-24
Payer: COMMERCIAL

## 2022-05-24 VITALS
WEIGHT: 251 LBS | BODY MASS INDEX: 29.04 KG/M2 | TEMPERATURE: 98.4 F | OXYGEN SATURATION: 96 % | DIASTOLIC BLOOD PRESSURE: 78 MMHG | HEART RATE: 67 BPM | HEIGHT: 78 IN | SYSTOLIC BLOOD PRESSURE: 126 MMHG

## 2022-05-24 DIAGNOSIS — K42.9 UMBILICAL HERNIA WITHOUT OBSTRUCTION AND WITHOUT GANGRENE: ICD-10-CM

## 2022-05-24 DIAGNOSIS — J45.40 MODERATE PERSISTENT ASTHMA, UNSPECIFIED WHETHER COMPLICATED: Primary | ICD-10-CM

## 2022-05-24 DIAGNOSIS — I26.99 BILATERAL PULMONARY EMBOLISM (HCC): ICD-10-CM

## 2022-05-24 PROCEDURE — G8427 DOCREV CUR MEDS BY ELIG CLIN: HCPCS | Performed by: INTERNAL MEDICINE

## 2022-05-24 PROCEDURE — 1036F TOBACCO NON-USER: CPT | Performed by: INTERNAL MEDICINE

## 2022-05-24 PROCEDURE — G8417 CALC BMI ABV UP PARAM F/U: HCPCS | Performed by: INTERNAL MEDICINE

## 2022-05-24 PROCEDURE — 99214 OFFICE O/P EST MOD 30 MIN: CPT | Performed by: INTERNAL MEDICINE

## 2022-05-24 RX ORDER — PREDNISONE 10 MG/1
TABLET ORAL
Qty: 40 TABLET | Refills: 0 | Status: SHIPPED | OUTPATIENT
Start: 2022-05-24 | End: 2022-06-09

## 2022-05-24 RX ORDER — OXYCODONE HYDROCHLORIDE 5 MG/1
5 TABLET ORAL EVERY 6 HOURS PRN
Qty: 20 TABLET | Refills: 0 | Status: SHIPPED | OUTPATIENT
Start: 2022-05-24 | End: 2022-05-29

## 2022-05-24 ASSESSMENT — ASTHMA QUESTIONNAIRES
QUESTION_2 LAST FOUR WEEKS HOW OFTEN HAVE YOU HAD SHORTNESS OF BREATH: 1
QUESTION_1 LAST FOUR WEEKS HOW MUCH OF THE TIME DID YOUR ASTHMA KEEP YOU FROM GETTING AS MUCH DONE AT WORK, SCHOOL OR AT HOME: 1
QUESTION_5 LAST FOUR WEEKS HOW WOULD YOU RATE YOUR ASTHMA CONTROL: 2
QUESTION_4 LAST FOUR WEEKS HOW OFTEN HAVE YOU USED YOUR RESCUE INHALER OR NEBULIZER MEDICATION (SUCH AS ALBUTEROL): 2
QUESTION_3 LAST FOUR WEEKS HOW OFTEN DID YOUR ASTHMA SYMPTOMS (WHEEZING, COUGHING, SHORTNESS OF BREATH, CHEST TIGHTNESS OR PAIN) WAKE YOU UP AT NIGHT OR EARLIER THAN USUAL IN THE MORNING: 1
ACT_TOTALSCORE: 7

## 2022-05-24 ASSESSMENT — ENCOUNTER SYMPTOMS
COUGH: 1
CHEST TIGHTNESS: 1
RHINORRHEA: 1

## 2022-05-24 NOTE — PROGRESS NOTES
Tomy Perez (:  1975) is a 51 y.o. male,Established patient, here for evaluation of the following chief complaint(s):  Asthma         ASSESSMENT/PLAN:  1. Moderate persistent asthma, unspecified whether complicated  worsening  -     predniSONE (DELTASONE) 10 MG tablet; Take 4 tablets by mouth daily for 4 days, THEN 3 tablets daily for 4 days, THEN 2 tablets daily for 4 days, THEN 1 tablet daily for 4 days. , Disp-40 tablet, R-0Normal    2. Bilateral pulmonary embolism (HCC)  -     apixaban (ELIQUIS) 5 MG TABS tablet; Take 1 tablet by mouth 2 times daily, Disp-180 tablet, R-3Normal      No follow-ups on file. Subjective   SUBJECTIVE/OBJECTIVE:  Asthma  He complains of chest tightness and cough. The current episode started 1 to 4 weeks ago. The problem has been gradually worsening. The cough is non-productive. Associated symptoms include nasal congestion and rhinorrhea. Pertinent negatives include no ear congestion. His symptoms are alleviated by beta-agonist. He reports minimal improvement on treatment. His past medical history is significant for asthma. Review of Systems   HENT: Positive for rhinorrhea. Respiratory: Positive for cough. Objective    Vitals:    22 1129   BP: 126/78   Site: Right Upper Arm   Position: Sitting   Cuff Size: Large Adult   Pulse: 67   Temp: 98.4 °F (36.9 °C)   TempSrc: Infrared   SpO2: 96%   Weight: 251 lb (113.9 kg)   Height: 6' 6\" (1.981 m)      Wt Readings from Last 3 Encounters:   22 251 lb (113.9 kg)   22 256 lb 1.6 oz (116.2 kg)   22 256 lb (116.1 kg)     BP Readings from Last 3 Encounters:   22 126/78   22 (!) 130/90   22 120/72     Body mass index is 29.01 kg/m². Facility age limit for growth percentiles is 20 years. Physical Exam  Constitutional:       General: He is not in acute distress. Appearance: He is not ill-appearing.    HENT:      Right Ear: Tympanic membrane normal. There is no impacted cerumen. Left Ear: Tympanic membrane normal. There is no impacted cerumen. Nose: Nose normal. No congestion or rhinorrhea. Mouth/Throat:      Mouth: Mucous membranes are moist.      Pharynx: No oropharyngeal exudate or posterior oropharyngeal erythema. Cardiovascular:      Rate and Rhythm: Normal rate and regular rhythm. Pulmonary:      Effort: Prolonged expiration present. No tachypnea, bradypnea or respiratory distress. Breath sounds: Examination of the right-lower field reveals decreased breath sounds. Examination of the left-lower field reveals decreased breath sounds. Decreased breath sounds present. No wheezing. Musculoskeletal:      Cervical back: Normal range of motion. No rigidity or tenderness. Neurological:      Mental Status: He is alert. ASTHMA CONTROL TEST 5/24/2022 8/28/2019 4/3/2019   In the past 4 weeks, how much of the time did your asthma keep you from getting as much done at work, school or at home? 1 4 3   During the past 4 weeks, how often have you had shortness of breath? 1 4 3   During the past 4 weeks, how often did your asthma symptoms (wheezing, coughing, shortness of breath, chest tightness or pain) wake you up at night or earlier than usual in the morning? 1 5 5   During the past 4 weeks, how often have you used your rescue inhaler or nebulizer medication (such as albuterol)? 2 4 3   How would you rate your asthma control during the past 4 weeks? 2 4 4   Asthma Control Test Total Score 7 21 18             An electronic signature was used to authenticate this note.     --Geraldine Ryan MD

## 2022-05-27 ENCOUNTER — OFFICE VISIT (OUTPATIENT)
Dept: SURGERY | Age: 47
End: 2022-05-27

## 2022-05-27 VITALS — SYSTOLIC BLOOD PRESSURE: 128 MMHG | DIASTOLIC BLOOD PRESSURE: 78 MMHG | WEIGHT: 252 LBS | BODY MASS INDEX: 29.12 KG/M2

## 2022-05-27 DIAGNOSIS — R10.32 LEFT INGUINAL PAIN: Primary | ICD-10-CM

## 2022-05-27 PROCEDURE — 99024 POSTOP FOLLOW-UP VISIT: CPT | Performed by: SURGERY

## 2022-05-27 NOTE — PROGRESS NOTES
[address] and Laparoscopic Surgery  SUBJECTIVE:    Velma Robert   1975   51 y.o. male presents for routine postoperative followup after Open primary umbilical hernia repair and robot-assisted diagnostic laparoscopy on May 19, 2022. During surgery also injected local anesthetic creating ilioinguinal block which did give pain relief for 1-2 days. Umbilical pain improving. Left inguinal pain otherwise unchanged.  No other complaints    Past Medical History:   Diagnosis Date    Asthma     since P.E.    Collar bone fracture     COVID     BIJAL (generalized anxiety disorder)     Hx of blood clots     Migraine     Moderate episode of recurrent major depressive disorder ([facility] [address] 2018    Obstructive sleep apnea syndrome 2021    Prolonged emergence from general anesthesia     Pulmonary emboli ([facility] [address]     ,     Sleep apnea     just dx'ed; getting CPAP     Past Surgical History:   Procedure Laterality Date    APPENDECTOMY  2019    COLONOSCOPY N/A 3/8/2021    COLONOSCOPY WITH ANESTHESIA -SLEEP APNEA- performed by Yvette Bautista MD at [address] [place] [address] Left 2000    HERNIA REPAIR Right 2009    inguinal    HERNIA REPAIR N/A 2022    ROBOT ASSISTED LAPAROSCOPY performed by Nely Ellison MD at [address] [facility] N/A 3/55/4833    OPEN UMBILICAL HERNIA REPAIR performed by Nely Ellison MD at [address] [facility] History     Socioeconomic History    Marital status: Single     Spouse name: Not on file    Number of children: 2    Years of education: Not on file    Highest education level: Not on file   Occupational History    Occupation: [employer]     Tobacco Use    Smoking status: Former Smoker     Packs/day: 0.50     Years: 25.00     Pack years: 12.50     Types: Cigarettes     Start date: 1989     Quit date: 2015     Years since quittin.7    Smokeless tobacco: Never Used    Tobacco comment: started to smoke at 12 / smoked up to 1 to 1.5 p.p.d / quit smoking 9/1/2015   Vaping Use    Vaping Use: Former    Substances: Always   Substance and Sexual Activity    Alcohol use: Yes     Alcohol/week: 2.0 standard drinks     Types: 2 Cans of beer per week     Comment: socially    Drug use: No    Sexual activity: Yes     Partners: Female     Comment: 1 child   Other Topics Concern    Not on file   Social History Narrative    Lives with mother-December 10, 2020      Social Determinants of Health     Financial Resource Strain: Low Risk     Difficulty of Paying Living Expenses: Not hard at all   Food Insecurity: No Food Insecurity    Worried About Running Out of Food in the Last Year: Never true    [address] in the Last Year: Never true   Transportation Needs:     Lack of Transportation (Medical): Not on file    Lack of Transportation (Non-Medical):  Not on file   Physical Activity:     Days of Exercise per Week: Not on file    Minutes of Exercise per Session: Not on file   Stress:     Feeling of Stress : Not on file   Social Connections:     Frequency of Communication with Friends and Family: Not on file    Frequency of Social Gatherings with Friends and Family: Not on file    Attends [employer] Services: Not on file    Active Member of [address] [place] [address] [employer] or Organizations: Not on file    Attends Club or Organization Meetings: Not on file    Marital Status: Not on file   Intimate Partner Violence:     Fear of Current or Ex-Partner: Not on file    Emotionally Abused: Not on file    Physically Abused: Not on file    Sexually Abused: Not on file   Housing Stability:     Unable to Pay for Housing in the Last Year: Not on file    Number of [place] in the Last Year: Not on file    Unstable Housing in the Last Year: Not on file      Family History   Problem Relation Age of Onset    Diabetes Mother     Diabetes Sister     Clotting Disorder Maternal Grandfather     Diabetes Sister     Clotting Disorder Father Current Outpatient Medications   Medication Sig Dispense Refill    apixaban (ELIQUIS) 5 MG TABS tablet Take 1 tablet by mouth 2 times daily 180 tablet 3    predniSONE (DELTASONE) 10 MG tablet Take 4 tablets by mouth daily for 4 days, THEN 3 tablets daily for 4 days, THEN 2 tablets daily for 4 days, THEN 1 tablet daily for 4 days. 40 tablet 0    oxyCODONE (ROXICODONE) 5 MG immediate release tablet Take 1 tablet by mouth every 6 hours as needed for Pain for up to 5 days. 20 tablet 0    LEVOCETIRIZINE DIHYDROCHLORIDE PO Take by mouth      cyclobenzaprine (FLEXERIL) 10 MG tablet TAKE 1 TABLET BY MOUTH THREE TIMES A DAY AS NEEDED FOR MUSCLE SPASMS 60 tablet 0    lactobacillus (CULTURELLE) CAPS capsule Take 1 capsule by mouth daily 30 capsule 3    citalopram (CELEXA) 20 MG tablet TAKE 1 TABLET BY MOUTH EVERY DAY 90 tablet 2    pramipexole (MIRAPEX) 0.25 MG tablet TAKE 1 TO 2 TABLETS BY MOUTH AT BEDTIME 180 tablet 0    Budeson-Glycopyrrol-Formoterol (BREZTRI AEROSPHERE) 160-9-4.8 MCG/ACT AERO Inhale 2 puffs into the lungs 2 times daily 2 each 0    Budeson-Glycopyrrol-Formoterol (BREZTRI AEROSPHERE) 160-9-4.8 MCG/ACT AERO Inhale 2 puffs into the lungs 2 times daily 10.7 g 11    valACYclovir (VALTREX) 500 MG tablet Take 1 tablet by mouth daily 30 tablet 5    amphetamine-dextroamphetamine (ADDERALL XR) 15 MG extended release capsule Take 1 capsule by mouth daily for 30 days. 30 capsule 0    amphetamine-dextroamphetamine (ADDERALL XR) 15 MG extended release capsule Take 1 capsule by mouth daily for 30 days. 30 capsule 0    albuterol sulfate HFA (PROVENTIL HFA) 108 (90 Base) MCG/ACT inhaler Inhale 2 puffs into the lungs every 4 hours as needed for Wheezing or Shortness of Breath 1 Inhaler 11     No current facility-administered medications for this visit.       No Known Allergies     Review of Systems:  Review of systems performed and negative with the exception of the above findings    OBJECTIVE:  /78 Wt 252 lb (114.3 kg)   BMI 29.12 kg/m²      Physical Exam:  General appearance: alert, appears stated age, cooperative and no distress  Abdomen: soft, non-distended, appropriate incisional tenderness, incision clean dry and intact    Orders Only on 04/19/2022   Component Date Value Ref Range Status    Color, UA 04/19/2022 Yellow  Straw/Yellow Final    Clarity, UA 04/19/2022 SL CLOUDY* Clear Final    Glucose, Ur 04/19/2022 Negative  Negative mg/dL Final    Bilirubin Urine 04/19/2022 Negative  Negative Final    Ketones, Urine 04/19/2022 Negative  Negative mg/dL Final    Specific Gravity, UA 04/19/2022 >=1.030  1.005 - 1.030 Final    Blood, Urine 04/19/2022 Negative  Negative Final    pH, UA 04/19/2022 6.0  5.0 - 8.0 Final    Protein, UA 04/19/2022 Negative  Negative mg/dL Final    Urobilinogen, Urine 04/19/2022 0.2  <2.0 E.U./dL Final    Nitrite, Urine 04/19/2022 Negative  Negative Final    Leukocyte Esterase, Urine 04/19/2022 Negative  Negative Final    Microscopic Examination 04/19/2022 Not Indicated   Final    Urine Type 04/19/2022 Cleancatch   Final    Comment: Urine received in a tube containing preservative. This preservative will not effect the physical  characteristics of the urine.  TRICHOMONAS VAGINALIS SCREEN 04/19/2022 Negative  Negative Final    Comment: \"False negative results may occur in specimens  collected incorrectly or if the antigen concentration  is below the sensitivity of the test.  Test results  should be used only as adjunct to other  clinical information.       Specimen Type 04/19/2022 Urine   Final    Comment: The use of this test on urine was developed and its  performance characteristics were determined by [facility]  laboratory. It has not been cleared or approved by the [facility] and Drug Administration.       C. trachomatis DNA ,Urine 04/19/2022 Negative  Negative Final    N. gonorrhoeae DNA, Urine 04/19/2022 Negative  Negative Final    Sodium 04/19/2022 140  136 - 145 mmol/L Final    Potassium 04/19/2022 4.7  3.5 - 5.1 mmol/L Final    Chloride 04/19/2022 102  99 - 110 mmol/L Final    CO2 04/19/2022 26  21 - 32 mmol/L Final    Anion Gap 04/19/2022 12  3 - 16 Final    Glucose 04/19/2022 102* 70 - 99 mg/dL Final    BUN 04/19/2022 14  7 - 20 mg/dL Final    CREATININE 04/19/2022 1.3  0.9 - 1.3 mg/dL Final    GFR Non- 04/19/2022 59* >60 Final    Comment: >60 mL/min/1.73m2 EGFR, calc. for ages 25 and older using the  MDRD formula (not corrected for weight), is valid for stable  renal function.  GFR  04/19/2022 >60  >60 Final    Comment: Chronic Kidney Disease: less than 60 ml/min/1.73 sq.m. Kidney Failure: less than 15 ml/min/1.73 sq.m. Results valid for patients 18 years and older.  Calcium 04/19/2022 9.7  8.3 - 10.6 mg/dL Final    Total Protein 04/19/2022 7.5  6.4 - 8.2 g/dL Final    Albumin 04/19/2022 4.9  3.4 - 5.0 g/dL Final    Albumin/Globulin Ratio 04/19/2022 1.9  1.1 - 2.2 Final    Total Bilirubin 04/19/2022 0.4  0.0 - 1.0 mg/dL Final    Alkaline Phosphatase 04/19/2022 65  40 - 129 U/L Final    ALT 04/19/2022 37  10 - 40 U/L Final    AST 04/19/2022 21  15 - 37 U/L Final    Hemoglobin A1C 04/19/2022 5.6  See comment % Final    Comment: Comment:  Diagnosis of Diabetes: > or = 6.5%  Increased risk of diabetes (Prediabetes): 5.7-6.4%  Glycemic Control: Nonpregnant Adults: <7.0%                    Pregnant: <6.0%        eAG 04/19/2022 114.0  mg/dL Final    Microalbumin, Random Urine 04/19/2022 <1.20  <2.0 mg/dL Final    Creatinine, Ur 04/19/2022 194.6  39.0 - 259.0 mg/dL Final    Microalbumin Creatinine Ratio 04/19/2022 see below  0.0 - 30.0 mg/g Final    Comment: Ratio cannot be calculated since microalbumin level is below  the lower detection limit.       PSA 04/19/2022 0.66  0.00 - 4.00 ng/mL Final       CT ABDOMEN PELVIS W IV CONTRAST Additional Contrast? Oral    Result Date: 5/13/2022  EXAMINATION: CT OF THE ABDOMEN AND PELVIS WITH CONTRAST 5/12/2022 5:09 pm TECHNIQUE: CT of the abdomen and pelvis was performed with the administration of intravenous contrast. Multiplanar reformatted images are provided for review. Automated exposure control, iterative reconstruction, and/or weight based adjustment of the mA/kV was utilized to reduce the radiation dose to as low as reasonably achievable. COMPARISON: 12/02/2017 HISTORY: ORDERING SYSTEM PROVIDED HISTORY: Left inguinal pain TECHNOLOGIST PROVIDED HISTORY: Additional Contrast?->Oral STAT Creatinine as needed:->Yes Reason for exam:->left inguinal pain, possible hernia Reason for Exam: left inguinal pain, possible hernia FINDINGS: Lower Chest: There is dependent bibasilar atelectasis. Organs: The liver, spleen, pancreas, adrenal glands and kidneys are unremarkable. No change in the punctate nonobstructing left nephrolithiasis. GI/Bowel: There is no bowel obstruction. Status post appendectomy. Pelvis: The pelvic viscera are within normal limits. There is no left inguinal hernia or mass. Peritoneum/Retroperitoneum: There is no evidence of free fluid or adenopathy. There is a small fat containing umbilical hernia. Bones/Soft Tissues: Degenerative changes involve the thoracolumbar spine. 1. No acute abnormality. CTA Pulmonary W and WO Contrast    Result Date: 5/21/2022  Site: [facility] #: [id number] #: [id number]: [place] [employer] #: [de-identified] #: [id number] #: [id number]: CT ANGIOGRAM CHEST FOR PEExam Date/Time: 05/21/2022 07:25 AMAdmitting Diagnosis: PE suspected, high probReason for Exam: PE suspected, high prob Dictated by: Ryan Grimes ANDREW: 05/21/2022 08:32 AMT: This document is confidential medical information. Unauthorized disclosure or use of this information is prohibited by law. If you are not the intended recipient of this document, please advise us by calling immediately [phone]. Impression/Conclusion below 75 HISTORY:   PE suspected, high prob . Rule out pulmonary embolus COMPARISON: 11/3/2020 TECHNIQUE: Postcontrast multiplanar CT images of the chest, including 3D MIP reconstructions, with special attention to the pulmonary arteries NOTE:  If there are questions about the content of this report, please contact [address] [place] [address] radiology by calling [phone]. FINDINGS: PULMONARY ARTERIES:  No suspicious filling defect to suggest pulmonary embolus LUNGS/AIRWAYS:  Mild left basilar airspace opacity, similar to prior examination likely scarring PLEURA: Unremarkable. No pleural effusion or pneumothorax MEDIASTINUM/HONORIO:  Unremarkable HEART/PERICARDIUM:  Unremarkable VESSELS:  Unremarkable. No aneurysm CHEST WALL/LOWER NECK:  Unremarkable UPPER ABDOMEN:  Unremarkable BONES:  Unremarkable OTHER:  None  IMPRESSION: No evidence of pulmonary embolus. No acute abnormality. SIGNED BY: Angely Avendano MD on 5/21/2022  8:29 AM   [address] [place] [address] [phone] [facility] Call Center: [phone]       XR ABDOMEN (2 VIEWS)    Result Date: 5/21/2022  Site: [facility] #: [id number] #: [id number]: Jimbo Mcgee #: [de-identified] #: [id number] #: [id number]: XR ABDOMEN FLAT AND UPRIGHTExam Date/Time: 05/21/2022 09:50 AMAdmitting Diagnosis: pain, constipationReason for Exam: pain, constipation Dictated by: Greta Silva ANDREW: 05/21/2022 10:12 AMT: This document is confidential medical information. Unauthorized disclosure or use of this information is prohibited by law. If you are not the intended recipient of this document, please advise us by calling immediately [phone].  Impression/Conclusion below HISTORY:  pain, constipation  pain, constipation COMPARISON: None NOTE:  If there are questions about the content of this report, please contact [facility] radiology by calling [phone] FINDINGS: LINES:  None BOWEL: Moderate colonic stool burden. No evidence of obstruction. FREE AIR:  Small amount of free intraperitoneal air under the right hemidiaphragm ABNORMAL CALCIFICATIONS:  None BONES:  Unremarkable OTHER:  Bladder opacification noted. IMPRESSION: Moderate colonic stool burden. Small amount of pneumoperitoneum. This may be postoperative in the setting of recent surgery. Correlation with any abdominal pain is suggested. SIGNED BY: Merlin Lomeli MD on 5/21/2022 10:09 AM   [address] [place] [address] [phone] -  [facility]: [phone]         Pathology:  none    Assessment:  Open primary umbilical hernia repair and robot-assisted diagnostic laparoscopy on May 19, 2022    Plan:  No heavy lifting over 20lbs for 6 weeks total postop  Diet and activity as tolerated otherwise  OK to return to work for light duty  Follow up with general surgery office as needed  Refer to orthopedics for evaluation of hip/spine as possible etiologies of left inguinal pain  Follow with urology as scheduled for scrotal cyst, unlikely to be causing constellation of symptoms though    Andrae Mello MD, FACS  5/27/2022  12:50 PM

## 2022-06-01 ENCOUNTER — OFFICE VISIT (OUTPATIENT)
Dept: ORTHOPEDIC SURGERY | Age: 47
End: 2022-06-01

## 2022-06-01 VITALS — BODY MASS INDEX: 29.3 KG/M2 | WEIGHT: 253.2 LBS | RESPIRATION RATE: 16 BRPM | HEIGHT: 78 IN

## 2022-06-01 DIAGNOSIS — R10.32 LEFT GROIN PAIN: Primary | ICD-10-CM

## 2022-06-01 DIAGNOSIS — M54.16 SPINAL STENOSIS OF LUMBAR REGION WITH RADICULOPATHY: ICD-10-CM

## 2022-06-01 DIAGNOSIS — M48.061 SPINAL STENOSIS OF LUMBAR REGION WITH RADICULOPATHY: ICD-10-CM

## 2022-06-01 PROCEDURE — 99204 OFFICE O/P NEW MOD 45 MIN: CPT | Performed by: ORTHOPAEDIC SURGERY

## 2022-06-01 PROCEDURE — G8417 CALC BMI ABV UP PARAM F/U: HCPCS | Performed by: ORTHOPAEDIC SURGERY

## 2022-06-01 PROCEDURE — G8427 DOCREV CUR MEDS BY ELIG CLIN: HCPCS | Performed by: ORTHOPAEDIC SURGERY

## 2022-06-01 RX ORDER — CYCLOBENZAPRINE HCL 10 MG
TABLET ORAL
Qty: 60 TABLET | Refills: 0 | Status: SHIPPED | OUTPATIENT
Start: 2022-06-01 | End: 2022-06-27

## 2022-06-01 NOTE — PROGRESS NOTES
ORTHOPAEDIC CONSULTATION NOTE    Chief Complaint   Patient presents with    Hip Pain     LEFT HIP       HPI  22 initial ortho surgeon E&M visit  51 y.o. male seen in consultation at the request of William Morales MD for evaluation of left hip pain:  Onset chronic/years  Injury/trauma none  History of symptoms as above  Pain is located left low back pain, left buttock, radiates down the entire left leg into the foot  Also describes some mild groin pain as well  He describes numbness and tingling of the left lateral 3 toes  The pain will shoot up his back at times as well  Recently underwent umbilical hernia repair with Dr. Amy Ahn  Per chart review, looks like the patient has seen Dr. Arline Argueta in the past for his low back      Review of Systems  I have read over the ROS from the Patient History Form dated on 22  Pertinent positives include blurry vision, sinus trouble, asthma and shortness of breath, sleep apnea, depression  Rest of 13 point ROS otherwise negative except per HPI, and scanned into the patient's chart under the Media tab. Chart Review:  From 3/5/2020  Impression:    1. Degenerative disc disease, lumbar    2. Lumbar foraminal stenosis    3. Spondylosis without myelopathy or radiculopathy, lumbar region        Plan:  Clinical Course: shows no change   I discussed the diagnosis and the treatment options with Tomy Perez today.      In Summary:  The various treatment options were outlined and discussed with Tomy Perez including:  Conservative care options: physical therapy, ice, medications, bracing, and activity modification. The indications for therapeutic injections. The indications for additional imaging/laboratory studies. The indications for (possible future) interventions.      After considering the various options discussed, Tomy Perez elected to pursue a course of treatment that includes the followin.  Medications:  Continue anti-inflammatories with appropriate GI Precautions including to stop if develop dark tarry stools or GI upset and to take with food.     2. PT:  Prescribed home exercise program.     3. Further studies: No further studies.       4. Interventional:  Consider ANNAMARIE     5. Follow up:  4-6 weeks         No Known Allergies     Current Outpatient Medications   Medication Sig Dispense Refill    apixaban (ELIQUIS) 5 MG TABS tablet Take 1 tablet by mouth 2 times daily 180 tablet 3    predniSONE (DELTASONE) 10 MG tablet Take 4 tablets by mouth daily for 4 days, THEN 3 tablets daily for 4 days, THEN 2 tablets daily for 4 days, THEN 1 tablet daily for 4 days. 40 tablet 0    LEVOCETIRIZINE DIHYDROCHLORIDE PO Take by mouth      cyclobenzaprine (FLEXERIL) 10 MG tablet TAKE 1 TABLET BY MOUTH THREE TIMES A DAY AS NEEDED FOR MUSCLE SPASMS 60 tablet 0    lactobacillus (CULTURELLE) CAPS capsule Take 1 capsule by mouth daily 30 capsule 3    amphetamine-dextroamphetamine (ADDERALL XR) 15 MG extended release capsule Take 1 capsule by mouth daily for 30 days. 30 capsule 0    citalopram (CELEXA) 20 MG tablet TAKE 1 TABLET BY MOUTH EVERY DAY 90 tablet 2    pramipexole (MIRAPEX) 0.25 MG tablet TAKE 1 TO 2 TABLETS BY MOUTH AT BEDTIME 180 tablet 0    Budeson-Glycopyrrol-Formoterol (BREZTRI AEROSPHERE) 160-9-4.8 MCG/ACT AERO Inhale 2 puffs into the lungs 2 times daily 2 each 0    Budeson-Glycopyrrol-Formoterol (BREZTRI AEROSPHERE) 160-9-4.8 MCG/ACT AERO Inhale 2 puffs into the lungs 2 times daily 10.7 g 11    valACYclovir (VALTREX) 500 MG tablet Take 1 tablet by mouth daily 30 tablet 5    amphetamine-dextroamphetamine (ADDERALL XR) 15 MG extended release capsule Take 1 capsule by mouth daily for 30 days. 30 capsule 0    albuterol sulfate HFA (PROVENTIL HFA) 108 (90 Base) MCG/ACT inhaler Inhale 2 puffs into the lungs every 4 hours as needed for Wheezing or Shortness of Breath 1 Inhaler 11     No current facility-administered medications for this visit.        Past Medical History:   Diagnosis Date    Asthma     since P.E.    Collar bone fracture     COVID     BIJAL (generalized anxiety disorder)     Hx of blood clots     Migraine     Moderate episode of recurrent major depressive disorder ([facility] 2018    Obstructive sleep apnea syndrome 2021    Prolonged emergence from general anesthesia     Pulmonary emboli ([facility]     ,     Sleep apnea     just dx'ed; getting CPAP        Past Surgical History:   Procedure Laterality Date    APPENDECTOMY  2019    COLONOSCOPY N/A 3/8/2021    COLONOSCOPY WITH ANESTHESIA -SLEEP APNEA- performed by Clark Torres MD at [address] [place] [address] Left     HERNIA REPAIR Right 2009    inguinal    HERNIA REPAIR N/A 2022    ROBOT ASSISTED LAPAROSCOPY performed by Rick Valadez MD at [address] [place] [address] N/A     OPEN UMBILICAL HERNIA REPAIR performed by Rick Valadez MD at [place] [address]. History   Problem Relation Age of Onset    Diabetes Mother     Diabetes Sister     Clotting Disorder Maternal Grandfather     Diabetes Sister     Clotting Disorder Father        Social History     Socioeconomic History    Marital status: Single     Spouse name: Not on file    Number of children: 2    Years of education: Not on file    Highest education level: Not on file   Occupational History    Occupation: [employer]     Tobacco Use    Smoking status: Former Smoker     Packs/day: 0.50     Years: 25.00     Pack years: 12.50     Types: Cigarettes     Start date: 1989     Quit date: 2015     Years since quittin.7    Smokeless tobacco: Never Used    Tobacco comment: started to smoke at 12 / smoked up to 1 to 1.5 p.p.d / quit smoking 2015   Vaping Use    Vaping Use: Former    Substances: Always   Substance and Sexual Activity    Alcohol use:  Yes     Alcohol/week: 2.0 standard drinks     Types: 2 Cans of beer per week     Comment: socially    Drug use: No    Sexual activity: Yes     Partners: Female     Comment: 1 child   Other Topics Concern    Not on file   Social History Narrative    Lives with mother-December 10, 2020      Social Determinants of Health     Financial Resource Strain: Low Risk     Difficulty of Paying Living Expenses: Not hard at all   Food Insecurity: No Food Insecurity    Worried About Running Out of Food in the Last Year: Never true    [address] in the Last Year: Never true   Transportation Needs:     Lack of Transportation (Medical): Not on file    Lack of Transportation (Non-Medical): Not on file   Physical Activity:     Days of Exercise per Week: Not on file    Minutes of Exercise per Session: Not on file   Stress:     Feeling of Stress : Not on file   Social Connections:     Frequency of Communication with Friends and Family: Not on file    Frequency of Social Gatherings with Friends and Family: Not on file    Attends Hindu Services: Not on file    Active Member of [address] [place] [address] or Organizations: Not on file    Attends Club or Organization Meetings: Not on file    Marital Status: Not on file   Intimate Partner Violence:     Fear of Current or Ex-Partner: Not on file    Emotionally Abused: Not on file    Physically Abused: Not on file    Sexually Abused: Not on file   Housing Stability:     Unable to Pay for Housing in the Last Year: Not on file    Number of [place] in the Last Year: Not on file    Unstable Housing in the Last Year: Not on file        Vitals:    06/01/22 1039   Resp: 16   Weight: 253 lb 3.2 oz (114.9 kg)   Height: 6' 6\" (1.981 m)       Physical Exam  Constitutional - well-groomed, well-nourished, Body mass index is 29.26 kg/m².   Psychiatric - pleasant, normal mood & affect  Cardiovascular - RRR, negative peripheral edema, posterior tibialis pulse 2+  Respiratory - respirations unlabored, on room air; mask on  Skin - no rashes, wounds, or lesions seen on exposed skin  Spine - TTP lumbar spinous processes, TTP left paraspinal musculature, equivocal SLR today  Neurological - LLE SILT SP/DP/T/sural/saphenous nerve distributions; EHL/FHL/TA/GS intact  Left hip -    Inspection:  No obvious deformity/swelling/ecchymosis   Palpation:   No TTP greater trochanter region   Range of Motion:    Flexion contracture:  none    Flexion:  120     IR:  30     ER:  30   Special tests:    negative Stinchfield test    negative pain with log roll    negative crepitus with ROM    Leg lengths grossly symmetric        Narrative   Marleen Guerra #: [id number] #: [id number] Procedure: MR Lumbar Spine w/o Contrast ; Reason for Exam: degenerative disc disease, lumbar; lumbar foraminal stenosis; spondylosis without myelopathy or radiculopathy, lumbar region   This document is confidential medical information.  Unauthorized disclosure or use of this information is prohibited by law. If you are not the intended recipient of this document, please advise us by calling immediately [phone].       [employer] [facility], [address] [place], [place] [address]r [facility]           Patient Name: Adalberto Roberts   Case ID: [id number]   Patient : 1975   Referring Physician: Sherlyn Turner MD   Exam Date: 2020   Exam Description: MR Lumbar Spine w/o Contrast            HISTORY:  Low back pain.       TECHNICAL FACTORS:  Long- and short-axis fat- and water-weighted images were performed.       COMPARISON:  None.       FINDINGS:  Nominal levoscoliosis.       No lumbar fracture seen.       Multilevel minor disc desiccation without substantial disc space narrowing.  Straightening of    the spine.       The conus is normal terminating at L1.       Findings at individual levels demonstrate:       L1-2 tiny right paracentral annular tear.  No neural compression.       L2-3 tiny right paracentral annular tear with shallow disc protrusion gently abuts descending    right L3 nerve root in the recess.  No foramen stenosis.       L3-4 diffuse spondylotic disc displacement with small left lateral annular tear and disc    protrusion.  Tiny mid and right lateral recess annular rents.  Gentle abutment of left more    than right descending L4 nerve roots.  Minor abutment of exiting left L3 nerve root.  Mild    right foramen stenosis.       L4-5 mild diffuse spondylotic disc displacement.  Minor facet hypertrophy with capsulitis.     Mild left recess narrowing.  Compression of left and nominal abutment of right exiting L4 nerve    roots.       L5-S1 leftward spondylotic disc displacement.  Minor facet hypertrophy with mild capsulitis.     Abutment of descending left S1 nerve root in the recess.  Abutment of exiting left L5 nerve    root.  Mild right foramen stenosis.       No aortic aneurysm or retroperitoneal adenopathy.  No hydronephrosis.                         CONCLUSION:   1. L4-5 mild diffuse spondylotic disc displacement. Minor facet hypertrophy with capsulitis. Mild left recess narrowing. Compression of left and nominal abutment of right exiting L4 nerve    roots. 2. L5-S1 leftward spondylotic disc displacement. Minor facet hypertrophy with mild capsulitis. Abutment of descending left S1 nerve root in the recess. Abutment of exiting left L5 nerve    root. 3. L3-4 diffuse spondylotic disc displacement with small left lateral annular tear and disc    protrusion. Tiny mid and right lateral recess annular rents. Gentle abutment of left more than    right descending L4 nerve roots. Minor abutment of exiting left L3 nerve root.    4. L2-3 tiny right paracentral annular tear with shallow disc protrusion gently abuts    descending right L3 nerve root in the recess.       Thank you for the opportunity to provide your interpretation.               Dawit Daily MD       A: MILTON/yamilka 02/24/2020 5:41 PM   T: YAMILKA 02/24/2020 5:17 PM         Imaging:  Images were personally reviewed by myself  AP pelvis and Left hip 2 views performed 6/1/22 - no significant degenerative changes. Preserved articular height of bilateral hips. Minimal lateral osteophytes. No fractures or dislocation. Lumbar spine is partially visualized and does reveal DDD. Assessment & Plan:  51 y.o. male who presents with    Diagnosis Orders   1. Left groin pain  XR HIP 2-3 VW W PELVIS LEFT   2. Spinal stenosis of lumbar region with radiculopathy  MRI LUMBAR SPINE WO CONTRAST       No orders of the defined types were placed in this encounter.       Radiating pain down his left lower extremity, associated with low back pain, as well as LLE N/T  Has seen Dr Thompson Will in the past for this  Symptoms do not appear to be from his left hip  Hip radiographs reassuring    His last MRI was from 2020  Recommend repeat MRI lumbar spine which was ordered for him today, and advised him to follow up with the spine team afterwards  He can return to see Dr Thompson Will if he wishes also      Oscar Patterson MD

## 2022-06-07 ENCOUNTER — HOSPITAL ENCOUNTER (OUTPATIENT)
Dept: MRI IMAGING | Age: 47
Discharge: HOME | End: 2022-06-07
Payer: COMMERCIAL

## 2022-06-07 DIAGNOSIS — M54.16 SPINAL STENOSIS OF LUMBAR REGION WITH RADICULOPATHY: ICD-10-CM

## 2022-06-07 DIAGNOSIS — M48.061 SPINAL STENOSIS OF LUMBAR REGION WITH RADICULOPATHY: ICD-10-CM

## 2022-06-07 PROCEDURE — 72148 MRI LUMBAR SPINE W/O DYE: CPT

## 2022-06-08 ENCOUNTER — TELEPHONE (OUTPATIENT)
Dept: ORTHOPEDIC SURGERY | Age: 47
End: 2022-06-08

## 2022-06-08 ENCOUNTER — OFFICE VISIT (OUTPATIENT)
Dept: INTERNAL MEDICINE CLINIC | Age: 47
End: 2022-06-08
Payer: COMMERCIAL

## 2022-06-08 VITALS
OXYGEN SATURATION: 98 % | SYSTOLIC BLOOD PRESSURE: 126 MMHG | DIASTOLIC BLOOD PRESSURE: 84 MMHG | TEMPERATURE: 98.1 F | WEIGHT: 251 LBS | BODY MASS INDEX: 29.04 KG/M2 | HEART RATE: 85 BPM | HEIGHT: 78 IN

## 2022-06-08 DIAGNOSIS — J45.40 MODERATE PERSISTENT ASTHMA, UNSPECIFIED WHETHER COMPLICATED: Primary | ICD-10-CM

## 2022-06-08 PROCEDURE — 1036F TOBACCO NON-USER: CPT | Performed by: INTERNAL MEDICINE

## 2022-06-08 PROCEDURE — G8417 CALC BMI ABV UP PARAM F/U: HCPCS | Performed by: INTERNAL MEDICINE

## 2022-06-08 PROCEDURE — 99213 OFFICE O/P EST LOW 20 MIN: CPT | Performed by: INTERNAL MEDICINE

## 2022-06-08 PROCEDURE — G8427 DOCREV CUR MEDS BY ELIG CLIN: HCPCS | Performed by: INTERNAL MEDICINE

## 2022-06-08 ASSESSMENT — PATIENT HEALTH QUESTIONNAIRE - PHQ9
SUM OF ALL RESPONSES TO PHQ QUESTIONS 1-9: 6
4. FEELING TIRED OR HAVING LITTLE ENERGY: 1
2. FEELING DOWN, DEPRESSED OR HOPELESS: 1
SUM OF ALL RESPONSES TO PHQ QUESTIONS 1-9: 6
7. TROUBLE CONCENTRATING ON THINGS, SUCH AS READING THE NEWSPAPER OR WATCHING TELEVISION: 0
6. FEELING BAD ABOUT YOURSELF - OR THAT YOU ARE A FAILURE OR HAVE LET YOURSELF OR YOUR FAMILY DOWN: 0
9. THOUGHTS THAT YOU WOULD BE BETTER OFF DEAD, OR OF HURTING YOURSELF: 0
5. POOR APPETITE OR OVEREATING: 1
8. MOVING OR SPEAKING SO SLOWLY THAT OTHER PEOPLE COULD HAVE NOTICED. OR THE OPPOSITE, BEING SO FIGETY OR RESTLESS THAT YOU HAVE BEEN MOVING AROUND A LOT MORE THAN USUAL: 1
SUM OF ALL RESPONSES TO PHQ9 QUESTIONS 1 & 2: 2
10. IF YOU CHECKED OFF ANY PROBLEMS, HOW DIFFICULT HAVE THESE PROBLEMS MADE IT FOR YOU TO DO YOUR WORK, TAKE CARE OF THINGS AT HOME, OR GET ALONG WITH OTHER PEOPLE: 1
3. TROUBLE FALLING OR STAYING ASLEEP: 1
1. LITTLE INTEREST OR PLEASURE IN DOING THINGS: 1
SUM OF ALL RESPONSES TO PHQ QUESTIONS 1-9: 6
SUM OF ALL RESPONSES TO PHQ QUESTIONS 1-9: 6

## 2022-06-08 NOTE — PROGRESS NOTES
Tomy Perez (:  1975) is a 51 y.o. male,Established patient, here for evaluation of the following chief complaint(s):  Asthma         ASSESSMENT/PLAN:  1. Moderate persistent asthma, unspecified whether complicated  improved  -  continue prednisone taper  -  Use breztri regularly      Return in about 2 months (around 2022) for asthma 30 min. Subjective   SUBJECTIVE/OBJECTIVE:  HPI  Patient comes in for asthma. He is doing better on the prednisone on with his asthma. He is taking breztri for the asthma. Things appear to be improved. He is about to complete  The prednisone taper. Review of Systems       Objective    Vitals:    22 1242   BP: 126/84   Site: Right Upper Arm   Position: Sitting   Cuff Size: Medium Adult   Pulse: 85   Temp: 98.1 °F (36.7 °C)   TempSrc: Infrared   SpO2: 98%   Weight: 251 lb (113.9 kg)   Height: 6' 6\" (1.981 m)      Wt Readings from Last 3 Encounters:   22 251 lb (113.9 kg)   22 253 lb 3.2 oz (114.9 kg)   22 252 lb (114.3 kg)     BP Readings from Last 3 Encounters:   22 126/84   22 128/78   22 126/78     Body mass index is 29.01 kg/m². Facility age limit for growth percentiles is 20 years. Physical Exam  Constitutional:       Appearance: Normal appearance. HENT:      Head: Normocephalic and atraumatic. Right Ear: Tympanic membrane normal.      Left Ear: Tympanic membrane normal.      Nose: Nose normal. No congestion or rhinorrhea. Mouth/Throat:      Mouth: Mucous membranes are moist.      Pharynx: No oropharyngeal exudate or posterior oropharyngeal erythema. Cardiovascular:      Rate and Rhythm: Normal rate and regular rhythm. Pulmonary:      Effort: Pulmonary effort is normal. No respiratory distress. Breath sounds: No stridor. No wheezing or rhonchi. Musculoskeletal:      Cervical back: Normal range of motion and neck supple. No rigidity or tenderness.    Neurological:      Mental Status: He is alert. An electronic signature was used to authenticate this note.     --Lonnie Godoy MD

## 2022-06-08 NOTE — TELEPHONE ENCOUNTER
----- Message from Surya Carmona MD sent at 6/8/2022  8:39 AM EDT -----  MRI lumbar spine shows findings that can explain his LLE symptoms  FU with spine

## 2022-06-16 ENCOUNTER — OFFICE VISIT (OUTPATIENT)
Dept: ORTHOPEDIC SURGERY | Age: 47
End: 2022-06-16
Payer: COMMERCIAL

## 2022-06-16 VITALS — BODY MASS INDEX: 29.04 KG/M2 | WEIGHT: 251 LBS | HEIGHT: 78 IN

## 2022-06-16 DIAGNOSIS — M51.26 HNP (HERNIATED NUCLEUS PULPOSUS), LUMBAR: ICD-10-CM

## 2022-06-16 DIAGNOSIS — M48.061 FORAMINAL STENOSIS OF LUMBAR REGION: Primary | ICD-10-CM

## 2022-06-16 PROCEDURE — 99214 OFFICE O/P EST MOD 30 MIN: CPT | Performed by: PHYSICIAN ASSISTANT

## 2022-06-16 PROCEDURE — G8427 DOCREV CUR MEDS BY ELIG CLIN: HCPCS | Performed by: PHYSICIAN ASSISTANT

## 2022-06-16 PROCEDURE — G8417 CALC BMI ABV UP PARAM F/U: HCPCS | Performed by: PHYSICIAN ASSISTANT

## 2022-06-16 PROCEDURE — 1036F TOBACCO NON-USER: CPT | Performed by: PHYSICIAN ASSISTANT

## 2022-06-16 NOTE — PROGRESS NOTES
New Patient: LUMBAR SPINE    Referring Provider:  No ref. provider found    CHIEF COMPLAINT:    Chief Complaint   Patient presents with    Back Pain     lumbar       HISTORY OF PRESENT ILLNESS:       Mr. Manjinder Diaz  is a pleasant 51 y.o. male here for a ration regarding his LBP and left leg pain. He states his pain began insidiously about 2 years ago. Patient states that he was initially seen and treated by Dr. Pablo Torrez and was prescribed physical therapy which she states improved the symptoms. His pain has steadily continued since then. He rates his back pain 7/10 and left leg pain 7/10. He describes the pain as intermittent to constant. Pain is worse with prolonged sitting and weightbearing activity and improved some with lying down and sleeping. The leg pain radiates down the posterior aspect of his left leg to his foot. Patient also states that he has intermittent sharp pain within his left groin. Amrit Ruslan He denies numbness and tingling in his right or left leg. He has a sense of weakness of his eft leg and denies bowel or bladder dysfunction. The pain at times disrupts his sleep. Pain Assessment  Location of Pain: Back  Severity of Pain: 7  Quality of Pain: Other (Comment)  Duration of Pain: Other (Comment)]  Current/Past Treatment:   · Physical Therapy:  In the past with benefit  · Chiropractic: None  · Injection: None  · Medications: Flexeril    Past Medical History:   Past Medical History:   Diagnosis Date    Asthma     since P.E.    Collar bone fracture     COVID     BIJAL (generalized anxiety disorder)     Hx of blood clots     Migraine     Moderate episode of recurrent major depressive disorder ([facility] 05/01/2018    Obstructive sleep apnea syndrome 4/6/2021    Prolonged emergence from general anesthesia     Pulmonary emboli ([address]     2017, 2018    Sleep apnea 2021    just dx'ed; getting CPAP        Past Surgical History:     Past Surgical History:   Procedure Laterality Date    APPENDECTOMY  2019  COLONOSCOPY N/A 3/8/2021    COLONOSCOPY WITH ANESTHESIA -SLEEP APNEA- performed by Viki Martínez MD at [address] [place] [address] Left 2000   [facility] Right 2009    inguinal    HERNIA REPAIR N/A 5/19/2022    ROBOT ASSISTED LAPAROSCOPY performed by Rina Landin MD at [address] [place] [address] N/A 5/29/2984    OPEN UMBILICAL HERNIA REPAIR performed by Rina Landin MD at [facility]       Current Medications:     Current Outpatient Medications:     cyclobenzaprine (FLEXERIL) 10 mg tablet, TAKE 1 TABLET BY MOUTH THREE TIMES A DAY AS NEEDED FOR MUSCLE SPASMS, Disp: 60 tablet, Rfl: 0    apixaban (ELIQUIS) 5 MG TABS tablet, Take 1 tablet by mouth 2 times daily, Disp: 180 tablet, Rfl: 3    LEVOCETIRIZINE DIHYDROCHLORIDE PO, Take by mouth, Disp: , Rfl:     lactobacillus (CULTURELLE) CAPS capsule, Take 1 capsule by mouth daily, Disp: 30 capsule, Rfl: 3    amphetamine-dextroamphetamine (ADDERALL XR) 15 MG extended release capsule, Take 1 capsule by mouth daily for 30 days. , Disp: 30 capsule, Rfl: 0    citalopram (CELEXA) 20 MG tablet, TAKE 1 TABLET BY MOUTH EVERY DAY, Disp: 90 tablet, Rfl: 2    pramipexole (MIRAPEX) 0.25 MG tablet, TAKE 1 TO 2 TABLETS BY MOUTH AT BEDTIME, Disp: 180 tablet, Rfl: 0    Budeson-Glycopyrrol-Formoterol (BREZTRI AEROSPHERE) 160-9-4.8 MCG/ACT AERO, Inhale 2 puffs into the lungs 2 times daily, Disp: 2 each, Rfl: 0    Budeson-Glycopyrrol-Formoterol (BREZTRI AEROSPHERE) 160-9-4.8 MCG/ACT AERO, Inhale 2 puffs into the lungs 2 times daily, Disp: 10.7 g, Rfl: 11    valACYclovir (VALTREX) 500 MG tablet, Take 1 tablet by mouth daily, Disp: 30 tablet, Rfl: 5    amphetamine-dextroamphetamine (ADDERALL XR) 15 MG extended release capsule, Take 1 capsule by mouth daily for 30 days. , Disp: 30 capsule, Rfl: 0    albuterol sulfate HFA (PROVENTIL HFA) 108 (90 Base) MCG/ACT inhaler, Inhale 2 puffs into the lungs every 4 hours as needed for Wheezing or Shortness of Breath, Disp: 1 Inhaler, Rfl: 11    Allergies:  Patient has no known allergies. Social History:    reports that he quit smoking about 6 years ago. His smoking use included cigarettes. He started smoking about 33 years ago. He has a 12.50 pack-year smoking history. He has never used smokeless tobacco. He reports current alcohol use of about 2.0 standard drinks of alcohol per week. He reports that he does not use drugs. Family History:   Family History   Problem Relation Age of Onset    Diabetes Mother     Diabetes Sister     Clotting Disorder Maternal Grandfather     Diabetes Sister     Clotting Disorder Father        REVIEW OF SYSTEMS: Full ROS noted & scanned   CONSTITUTIONAL: Denies unexplained weight loss, fevers, chills or fatigue  NEUROLOGICAL: Denies unsteady gait or progressive weakness  MUSCULOSKELETAL: Denies joint swelling or redness  PSYCHOLOGICAL: Denies anxiety, depression   SKIN: Denies skin changes, delayed healing, rash, itching   HEMATOLOGIC: Denies easy bleeding or bruising  ENDOCRINE: Denies excessive thirst, urination, heat/cold  RESPIRATORY: Denies current dyspnea, cough  GI: Denies nausea, vomiting, diarrhea   : Denies bowel or bladder issues      PHYSICAL EXAM:    Vitals: Height 6' 5.99\" (1.981 m), weight 251 lb (113.9 kg). GENERAL EXAM:  · General Apparence: Patient is adequately groomed with no evidence of malnutrition. · Orientation: The patient is oriented to time, place and person. · Mood & Affect:The patient's mood and affect are appropriate. · Vascular: Examination reveals no swelling tenderness in upper or lower extremities. Good capillary refill. · Lymphatic: The lymphatic examination bilaterally reveals all areas to be without enlargement or induration  · Sensation: Sensation is intact without deficit  · Coordination/Balance: Good coordination. Tandem walking normal.     LUMBAR/SACRAL EXAMINATION:  · Inspection: Local inspection shows no step-off or bruising. Lumbar alignment is normal.  Sagittal and Coronal balance is neutral.      · Palpation:   No evidence of tenderness at the midline. No tenderness bilaterally at the paraspinal or trochanters. There is no step-off or paraspinal spasm. · Range of Motion: Lumbar flexion, extension and rotation are mildly limited due to pain. · Strength:   Strength testing is 5/5 in all muscle groups tested. · Special Tests:   Straight leg raise and crossed SLR negative. Leg length and pelvis level. · Skin: There are no rashes, ulcerations or lesions. · Reflexes: Reflexes are symmetrically 2+ at the patellar and ankle tendons. Clonus absent bilaterally at the feet. · Gait & station: normal, patient ambulates without assistance    · Additional Examinations:   · RIGHT LOWER EXTREMITY: Inspection/examination of the right lower extremity does not show any tenderness, deformity or injury. Range of motion is unremarkable. There is no gross instability. There are no rashes, ulcerations or lesions. Strength and tone are normal.  · LEFT LOWER EXTREMITY:  Inspection/examination of the left lower extremity does not show any tenderness, deformity or injury. Range of motion is unremarkable. There is no gross instability. There are no rashes, ulcerations or lesions. Strength and tone are normal.    Diagnostic Testing:    MRI of the lumbar spine that was obtained on 6/7/2022 was reviewed with the patient today which shows  Impression       1. Multilevel spondylosis as described, including grade 1 retrolisthesis L3 on L4 and L4 on L5. No associated central stenosis in the lumbar spine.  Foraminal stenosis present at multiple levels, most pronounced in the left L3-L4 and L5-S1 with no    definite associated foraminal nerve root impingement or displacement. Graft   2.  A left central disc herniation at L5-S1 effaces the left lateral recess producing contact and displacement of the descending left S1 nerve root       Impression:   Left HNP L5-S1  Left foraminal stenosis L5-S1  Retrolisthesis L3 on L4 and slightly L4 on L5    Plan:      · We discussed the diagnosis and treatment options including observation, additional oral steroids, physical therapy, epidural injections and additional imaging. He wishes to proceed with referral to Dr. Jason Langley for a left L5-S1 foraminal injection. Since the patient is recovering from an umbilical hernia repair we will hold off on physical therapy until he recovers from the hernia repair. .    · Follow up -4 weeks to review the efficacy of the injection    Old records were reviewed.     Rafi Mccracken PA-C  Board certified by the [facility]

## 2022-06-27 RX ORDER — CYCLOBENZAPRINE HCL 10 MG
TABLET ORAL
Qty: 60 TABLET | Refills: 0 | Status: SHIPPED | OUTPATIENT
Start: 2022-06-27 | End: 2022-07-18

## 2022-06-30 ENCOUNTER — HOSPITAL ENCOUNTER (OUTPATIENT)
Dept: INTERVENTIONAL RADIOLOGY/VASCULAR | Age: 47
Discharge: HOME | End: 2022-06-30
Payer: COMMERCIAL

## 2022-06-30 DIAGNOSIS — M54.16 LUMBAR RADICULOPATHY: ICD-10-CM

## 2022-06-30 PROCEDURE — 62323 NJX INTERLAMINAR LMBR/SAC: CPT

## 2022-06-30 PROCEDURE — 2709999900 HC NON-CHARGEABLE SUPPLY

## 2022-06-30 PROCEDURE — 6360000002 HC RX W HCPCS

## 2022-06-30 PROCEDURE — 2500000003 HC RX 250 WO HCPCS

## 2022-06-30 PROCEDURE — 6360000004 HC RX CONTRAST MEDICATION: Performed by: PAIN MEDICINE

## 2022-06-30 RX ADMIN — IOHEXOL 10 ML: 180 INJECTION INTRAVENOUS at 11:15

## 2022-06-30 NOTE — OP NOTE
PROCEDURE TITLE: Lumbar Epidural Steroid Injection under Fluoro      PREOPERATIVE DIAGNOSIS:    1. Lumbar Disc displacement  2. Lumbar Radiculopathy    POSTOPERATIVE DIAGNOSIS:  1. Same    PROCEDURE:  Lumbar epidural steroid injection L5-S1 under Fluroscopy. PROCEDURE NOTE:  After obtaining written informed consent patient was taken to the procedure room. Pre-procedure blood pressure and pulse were stable and recorded in patients clinic chart. The patient was placed in the prone position on fluoroscopy table. The lower back was prepped with antiseptic solution and draped in the usual sterile fashion. The skin over the L5-S1 was identified under fluoroscopic guidance and infiltrated with 3 ml of 1% Lidocaine for local anesthesia via 25 gauge needle. An 18-gauge Touhy needle, was used under fluoroscopic guidance to access the epidural space using loss of resistance to air technique. Approximately 2 ml of Omnipaque 180 contrast was injected under continuous fluoroscopic guidance and good spread was noted following a negative aspiration  Following negative aspiration, a mixture of 1 ml Marcaine 0.25% and Kenalog 80 mg was injected with minimal pressure. There was no evidence of CSF, paresthesia or heme. The needle was cleared with preservative free local anesthetic and removed. Skin was cleaned and a sterile dressing was applied. Following the procedure the patient's vital signs were stable. The patient was discharged home in good condition after being given discharge instructions.

## 2022-07-01 ENCOUNTER — TELEPHONE (OUTPATIENT)
Dept: INTERNAL MEDICINE CLINIC | Age: 47
End: 2022-07-01

## 2022-07-01 NOTE — TELEPHONE ENCOUNTER
Lori Box was suppose to fax update forms to be filled out today and patient expected to get them completed today as he intended on going back to work on Tuesday. Patient was informed Dr. Sarah Yarbrough is out of the office until Thursday 7/7 and will not be able to be completed before then.

## 2022-07-01 NOTE — TELEPHONE ENCOUNTER
Please Advise    [id number] [address] said that there still missing the Questionnaire Form      fax [phone]

## 2022-07-05 NOTE — TELEPHONE ENCOUNTER
[address] [address] called to follow up on this, advised that  [facility] is out of the office until Thursday. They are requesting office notes from Dec 1 - June 8.      Fax# [phone]

## 2022-07-18 RX ORDER — CYCLOBENZAPRINE HCL 10 MG
TABLET ORAL
Qty: 60 TABLET | Refills: 0 | Status: SHIPPED | OUTPATIENT
Start: 2022-07-18

## 2022-07-18 NOTE — TELEPHONE ENCOUNTER
Recent Visits  Date Type Provider Dept   06/08/22 Office Visit Ellery Duverney, MD [facility]   05/24/22 Office Visit Ellery Duverney, MD [facility]   04/19/22 Office Visit Ellery Duverney, MD [facility]   03/21/22 Office Visit Ellery Duverney, MD [facility]   02/16/22 Office Visit Candida Shea APRN - CNP [facility]   04/09/21 Office Visit Ellery Duverney, MD [facility]   Showing recent visits within past 540 days with a meds authorizing provider and meeting all other requirements  Future Appointments  Date Type Provider Dept   08/10/22 Appointment Ellery Duverney, MD [facility]   Showing future appointments within next 150 days with a meds authorizing provider and meeting all other requirements     6/8/2022

## 2022-08-08 ENCOUNTER — OFFICE VISIT (OUTPATIENT)
Dept: PULMONOLOGY | Age: 47
End: 2022-08-08
Payer: COMMERCIAL

## 2022-08-08 VITALS
HEART RATE: 82 BPM | HEIGHT: 72 IN | SYSTOLIC BLOOD PRESSURE: 124 MMHG | DIASTOLIC BLOOD PRESSURE: 82 MMHG | BODY MASS INDEX: 32.78 KG/M2 | WEIGHT: 242 LBS | OXYGEN SATURATION: 95 % | TEMPERATURE: 97.8 F

## 2022-08-08 DIAGNOSIS — G47.33 OBSTRUCTIVE SLEEP APNEA SYNDROME: Primary | Chronic | ICD-10-CM

## 2022-08-08 DIAGNOSIS — F41.1 GAD (GENERALIZED ANXIETY DISORDER): Chronic | ICD-10-CM

## 2022-08-08 DIAGNOSIS — G25.81 RLS (RESTLESS LEGS SYNDROME): ICD-10-CM

## 2022-08-08 PROCEDURE — 99214 OFFICE O/P EST MOD 30 MIN: CPT | Performed by: INTERNAL MEDICINE

## 2022-08-08 PROCEDURE — 1036F TOBACCO NON-USER: CPT | Performed by: INTERNAL MEDICINE

## 2022-08-08 PROCEDURE — G8417 CALC BMI ABV UP PARAM F/U: HCPCS | Performed by: INTERNAL MEDICINE

## 2022-08-08 PROCEDURE — G8427 DOCREV CUR MEDS BY ELIG CLIN: HCPCS | Performed by: INTERNAL MEDICINE

## 2022-08-08 RX ORDER — PRAMIPEXOLE DIHYDROCHLORIDE 0.25 MG/1
TABLET ORAL
Qty: 90 TABLET | Refills: 5 | Status: SHIPPED | OUTPATIENT
Start: 2022-08-08 | End: 2022-09-16 | Stop reason: SDUPTHER

## 2022-08-08 ASSESSMENT — SLEEP AND FATIGUE QUESTIONNAIRES
HOW LIKELY ARE YOU TO NOD OFF OR FALL ASLEEP WHILE SITTING INACTIVE IN A PUBLIC PLACE: 1
HOW LIKELY ARE YOU TO NOD OFF OR FALL ASLEEP WHILE WATCHING TV: 1
HOW LIKELY ARE YOU TO NOD OFF OR FALL ASLEEP WHILE SITTING AND READING: 1
HOW LIKELY ARE YOU TO NOD OFF OR FALL ASLEEP WHEN YOU ARE A PASSENGER IN A CAR FOR AN HOUR WITHOUT A BREAK: 2
HOW LIKELY ARE YOU TO NOD OFF OR FALL ASLEEP WHILE SITTING AND TALKING TO SOMEONE: 1
HOW LIKELY ARE YOU TO NOD OFF OR FALL ASLEEP WHILE LYING DOWN TO REST IN THE AFTERNOON WHEN CIRCUMSTANCES PERMIT: 3
HOW LIKELY ARE YOU TO NOD OFF OR FALL ASLEEP WHILE SITTING QUIETLY AFTER LUNCH WITHOUT ALCOHOL: 1
ESS TOTAL SCORE: 10
HOW LIKELY ARE YOU TO NOD OFF OR FALL ASLEEP IN A CAR, WHILE STOPPED FOR A FEW MINUTES IN TRAFFIC: 0

## 2022-08-08 NOTE — PROGRESS NOTES
[facility]  [address] [facility], [address] [facility] [phone]   [facility] [address] Biju Hernández. [address] [place] [address]. [address] [phone] [address] [facility]  [address] [place] [id number] [phone]      Assessment/Plan:      1. Obstructive sleep apnea syndrome  Assessment & Plan:  Chronic-with progression/exacerbation:  Continue medications per his PCP and other physicians. Instructed not to drive unless had 4 hrs of effective therapy for his VU the night before. Did review the risks of under or untreated VU including, but not limited to, higher risks of motor vehicle accidents, stroke, heart attacks, and death. He understands and accepts all these risks. Discussed different treatment options for sleep apnea clean oral appliance versus hypoglossal nerve stimulation. The patient is interested in possibly pursuing hypoglossal nerve stimulation so we will need an in lab PSG to confirm if he has moderate to severe sleep apnea before we can pursue further   Orders:  -     Baseline Diagnostic Sleep Study; Future  2. RLS (restless legs syndrome)  Assessment & Plan:  Chronic-stable: At this time we will have the patient continue with Mirapex 0.75 mg since he is getting benefit. Long-term may need to change over to gabapentin given the most current changes in first-line treatment for RLS but since he is getting benefit with Mirapex this time we will hold until he can treat his sleep apnea fully  Orders:  -     pramipexole (MIRAPEX) 0.25 MG tablet; 3 TABLETS BY MOUTH AT BEDTIME, Disp-90 tablet, R-5Normal  3. BIJAL (generalized anxiety disorder)  Assessment & Plan:  Chronic- Stable. Discussed the importance of treating sleep apnea as part of the management of this disorder. Cont any meds per PCP and other physicians.      Reviewed, analyzed, and documented physiologic data from patient's PAP machine. This information was analyzed to assess complexity and medical decision making in regards to further testing and management. The primary encounter diagnosis was Obstructive sleep apnea syndrome. Diagnoses of RLS (restless legs syndrome) and BIJAL (generalized anxiety disorder) were also pertinent to this visit. The chronic medical conditions listed are directly related to the primary diagnosis listed above. The management of the primary diagnosis affects the secondary diagnosis and vice versa. Subjective:   Subjective   Patient ID: Fern Boeck is a 49 y.o. male. Chief Complaint   Patient presents with    Sleep Apnea       HPI:    Patient to return his bilevel machine because of noncompliance. He has been off work for several months his sleep pattern is degenerated, he is staying up late until 2 or 3 AM and only sleeps 4 to 5 hours and then wakes up spontaneously. He is then awake but is sleepy during the day will sleep another 1 to 2 hours but then has trouble falling asleep at nighttime. He feels Mirapex 0.75 mg is really help with his legs and helps him fall asleep within 10 to 15 minutes but he is not able to maintain sleep. [place] - Total score: 10    Social History     Socioeconomic History    Marital status: Single     Spouse name: Not on file    Number of children: 2    Years of education: Not on file    Highest education level: Not on file   Occupational History    Occupation: [employer]     Tobacco Use    Smoking status: Former     Packs/day: 0.50     Years: 25.00     Pack years: 12.50     Types: Cigarettes     Start date: 1989     Quit date: 2015     Years since quittin.9    Smokeless tobacco: Never    Tobacco comments:     started to smoke at 14 / smoked up to 1 to 1.5 p.p.d / quit smoking 2015   Vaping Use    Vaping Use: Former    Substances: Always   Substance and Sexual Activity    Alcohol use:  Yes     Alcohol/week: 2.0 standard drinks     Types: 2 Cans of beer per week     Comment: socially    Drug use: No    Sexual activity: Yes     Partners: Female     Comment: 1 child   Other Topics Concern    Not on file   Social History Narrative    Lives with mother-December 10, 2020      Social Determinants of Health     Financial Resource Strain: Low Risk     Difficulty of Paying Living Expenses: Not hard at all   Food Insecurity: No Food Insecurity    Worried About Running Out of Food in the Last Year: Never true    Ran Out of Food in the Last Year: Never true   Transportation Needs: Not on file   Physical Activity: Not on file   Stress: Not on file   Social Connections: Not on file   Intimate Partner Violence: Not on file   Housing Stability: Not on file       Current Outpatient Medications   Medication Instructions    albuterol sulfate HFA (PROVENTIL HFA) 108 (90 Base) MCG/ACT inhaler 2 puffs, Inhalation, EVERY 4 HOURS PRN    amphetamine-dextroamphetamine (ADDERALL XR) 15 MG extended release capsule 15 mg, Oral, DAILY    amphetamine-dextroamphetamine (ADDERALL XR) 15 MG extended release capsule 15 mg, Oral, DAILY    apixaban (ELIQUIS) 5 mg, Oral, 2 TIMES DAILY    Budeson-Glycopyrrol-Formoterol (BREZTRI AEROSPHERE) 160-9-4.8 MCG/ACT AERO 2 puffs, Inhalation, 2 TIMES DAILY    Budeson-Glycopyrrol-Formoterol (BREZTRI AEROSPHERE) 160-9-4.8 MCG/ACT AERO 2 puffs, Inhalation, 2 TIMES DAILY    citalopram (CELEXA) 20 MG tablet TAKE 1 TABLET BY MOUTH EVERY DAY    cyclobenzaprine (FLEXERIL) 10 MG tablet TAKE 1 TABLET BY MOUTH THREE TIMES A DAY AS NEEDED FOR MUSCLE SPASMS    lactobacillus (CULTURELLE) CAPS capsule 1 capsule, Oral, DAILY    LEVOCETIRIZINE DIHYDROCHLORIDE PO Oral    pramipexole (MIRAPEX) 0.25 MG tablet 3 TABLETS BY MOUTH AT BEDTIME    valACYclovir (VALTREX) 500 mg, Oral, DAILY          Vitals:  Weight BMI   Wt Readings from Last 3 Encounters:   08/08/22 242 lb (109.8 kg)   06/16/22 251 lb (113.9 kg)   06/08/22 251 lb (113.9 kg)    Body mass index is 32.82 kg/m².      BP HR SaO2   BP Readings from Last 3 Encounters:   08/08/22 124/82   06/08/22 126/84   05/27/22 128/78    Pulse Readings from Last 3 Encounters:   08/08/22 82   06/08/22 85   05/24/22 67    SpO2 Readings from Last 3 Encounters:   08/08/22 95%   06/08/22 98%   05/24/22 96%        Electronically signed by Daphne Knott MD on 8/8/2022 at 2:20 PM

## 2022-08-08 NOTE — ASSESSMENT & PLAN NOTE
Chronic-stable: At this time we will have the patient continue with Mirapex 0.75 mg since he is getting benefit.   Long-term may need to change over to gabapentin given the most current changes in first-line treatment for RLS but since he is getting benefit with Mirapex this time we will hold until he can treat his sleep apnea fully

## 2022-08-08 NOTE — ASSESSMENT & PLAN NOTE
Chronic-with progression/exacerbation:  Continue medications per his PCP and other physicians. Instructed not to drive unless had 4 hrs of effective therapy for his VU the night before. Did review the risks of under or untreated VU including, but not limited to, higher risks of motor vehicle accidents, stroke, heart attacks, and death. He understands and accepts all these risks. Discussed different treatment options for sleep apnea clean oral appliance versus hypoglossal nerve stimulation.   The patient is interested in possibly pursuing hypoglossal nerve stimulation so we will need an in lab PSG to confirm if he has moderate to severe sleep apnea before we can pursue further

## 2022-08-08 NOTE — LETTER
[facility]  [address] [facility]  [address]  [address] [place] [address]  Phone: [phone]  Fax: [phone]    Wilfrido Weathers MD    August 8, 2022     Priyanka Rosenthal, [address] [facility]    Patient: Serge Griffin   MR Number: [id number]   YOB: 1975   Date of Visit: 8/8/2022       Dear Priyanka Rosenthal: Thank you for referring Tomy Perez to me for evaluation/treatment. Below are the relevant portions of my assessment and plan of care. 1. Obstructive sleep apnea syndrome  Assessment & Plan:  Chronic-with progression/exacerbation:  Continue medications per his PCP and other physicians. Instructed not to drive unless had 4 hrs of effective therapy for his VU the night before. Did review the risks of under or untreated VU including, but not limited to, higher risks of motor vehicle accidents, stroke, heart attacks, and death. He understands and accepts all these risks. Discussed different treatment options for sleep apnea clean oral appliance versus hypoglossal nerve stimulation. The patient is interested in possibly pursuing hypoglossal nerve stimulation so we will need an in lab PSG to confirm if he has moderate to severe sleep apnea before we can pursue further   Orders:  -     Baseline Diagnostic Sleep Study; Future  2. RLS (restless legs syndrome)  Assessment & Plan:  Chronic-stable: At this time we will have the patient continue with Mirapex 0.75 mg since he is getting benefit. Long-term may need to change over to gabapentin given the most current changes in first-line treatment for RLS but since he is getting benefit with Mirapex this time we will hold until he can treat his sleep apnea fully  Orders:  -     pramipexole (MIRAPEX) 0.25 MG tablet; 3 TABLETS BY MOUTH AT BEDTIME, Disp-90 tablet, R-5Normal  3. BIJAL (generalized anxiety disorder)  Assessment & Plan:  Chronic- Stable.   Discussed the importance of treating sleep apnea as part of the management of this disorder. Cont any meds per PCP and other physicians. Reviewed, analyzed, and documented physiologic data from patient's PAP machine. This information was analyzed to assess complexity and medical decision making in regards to further testing and management. The primary encounter diagnosis was Obstructive sleep apnea syndrome. Diagnoses of RLS (restless legs syndrome) and BIJAL (generalized anxiety disorder) were also pertinent to this visit. The chronic medical conditions listed are directly related to the primary diagnosis listed above. The management of the primary diagnosis affects the secondary diagnosis and vice versa. If you have questions, please do not hesitate to call me. I look forward to following Tomy along with you.     Sincerely,      Sarah Mary MD

## 2022-08-10 ENCOUNTER — OFFICE VISIT (OUTPATIENT)
Dept: INTERNAL MEDICINE CLINIC | Age: 47
End: 2022-08-10
Payer: COMMERCIAL

## 2022-08-10 VITALS
HEART RATE: 72 BPM | DIASTOLIC BLOOD PRESSURE: 78 MMHG | WEIGHT: 244 LBS | BODY MASS INDEX: 33.09 KG/M2 | SYSTOLIC BLOOD PRESSURE: 128 MMHG | RESPIRATION RATE: 16 BRPM | TEMPERATURE: 97 F | OXYGEN SATURATION: 98 %

## 2022-08-10 DIAGNOSIS — J45.40 MODERATE PERSISTENT ASTHMA, UNSPECIFIED WHETHER COMPLICATED: Primary | ICD-10-CM

## 2022-08-10 DIAGNOSIS — R06.02 SHORTNESS OF BREATH: ICD-10-CM

## 2022-08-10 DIAGNOSIS — I26.99 BILATERAL PULMONARY EMBOLISM (HCC): ICD-10-CM

## 2022-08-10 DIAGNOSIS — F33.1 MODERATE EPISODE OF RECURRENT MAJOR DEPRESSIVE DISORDER (HCC): ICD-10-CM

## 2022-08-10 PROCEDURE — G8427 DOCREV CUR MEDS BY ELIG CLIN: HCPCS | Performed by: INTERNAL MEDICINE

## 2022-08-10 PROCEDURE — 1036F TOBACCO NON-USER: CPT | Performed by: INTERNAL MEDICINE

## 2022-08-10 PROCEDURE — G8417 CALC BMI ABV UP PARAM F/U: HCPCS | Performed by: INTERNAL MEDICINE

## 2022-08-10 PROCEDURE — 99214 OFFICE O/P EST MOD 30 MIN: CPT | Performed by: INTERNAL MEDICINE

## 2022-08-10 RX ORDER — ARIPIPRAZOLE 5 MG/1
5 TABLET ORAL DAILY
Qty: 30 TABLET | Refills: 3 | Status: SHIPPED | OUTPATIENT
Start: 2022-08-10 | End: 2022-09-16 | Stop reason: SDUPTHER

## 2022-08-10 ASSESSMENT — ASTHMA QUESTIONNAIRES
QUESTION_4 LAST FOUR WEEKS HOW OFTEN HAVE YOU USED YOUR RESCUE INHALER OR NEBULIZER MEDICATION (SUCH AS ALBUTEROL): 2
QUESTION_3 LAST FOUR WEEKS HOW OFTEN DID YOUR ASTHMA SYMPTOMS (WHEEZING, COUGHING, SHORTNESS OF BREATH, CHEST TIGHTNESS OR PAIN) WAKE YOU UP AT NIGHT OR EARLIER THAN USUAL IN THE MORNING: 2
QUESTION_2 LAST FOUR WEEKS HOW OFTEN HAVE YOU HAD SHORTNESS OF BREATH: 1
QUESTION_1 LAST FOUR WEEKS HOW MUCH OF THE TIME DID YOUR ASTHMA KEEP YOU FROM GETTING AS MUCH DONE AT WORK, SCHOOL OR AT HOME: 1
ACT_TOTALSCORE: 7
QUESTION_5 LAST FOUR WEEKS HOW WOULD YOU RATE YOUR ASTHMA CONTROL: 1

## 2022-08-10 NOTE — PROGRESS NOTES
Tomy Perez (:  1975) is a 49 y.o. male,Established patient, here for evaluation of the following chief complaint(s):  Asthma (Follow up Asthma)         ASSESSMENT/PLAN:  1. Moderate persistent asthma, unspecified whether complicated  2. Shortness of breath  -     Echo 2d w doppler w color w contrast; Future  3. Bilateral pulmonary embolism ([address]  4. Moderate episode of recurrent major depressive disorder (HCC)  -     ARIPiprazole (ABILIFY) 5 MG tablet; Take 1 tablet by mouth in the morning., Disp-30 tablet, R-3Normal    No follow-ups on file. Subjective   SUBJECTIVE/OBJECTIVE:  HPI Asthma:  Current treatment includes combination beta agonists/steroid inhalers. Using preventive medication(s) consistently: yes. Residual symptoms: chest tightness, dyspnea, non-productive cough, and wheezing. Patient denies any other symptoms. He requires his rescue inhaler 4 time(s) per day. Mood Disorder:  Patient presents for follow-up of depression. Current complaints include: anhedonia, depressed mood, tearfulness, and insomnia. He denies any other symptoms. Symptoms/signs of laron: none. External stressors: illness or family illness and employment concern. Current treatment includes: Celexa- 20 mg.  Medication side effects: fatigue and insomnia. Review of Systems       Objective   Vitals:    08/10/22 1244   BP: 128/78   Pulse: 72   Resp: 16   Temp: 97 °F (36.1 °C)   TempSrc: Temporal   SpO2: 98%   Weight: 244 lb (110.7 kg)      Wt Readings from Last 3 Encounters:   08/10/22 244 lb (110.7 kg)   22 242 lb (109.8 kg)   22 251 lb (113.9 kg)     BP Readings from Last 3 Encounters:   08/10/22 128/78   22 124/82   22 126/84     Body mass index is 33.09 kg/m². Facility age limit for growth percentiles is 20 years. Physical Exam  Constitutional:       General: He is not in acute distress. Appearance: Normal appearance. He is not ill-appearing.    HENT:      Head: Normocephalic and atraumatic. Right Ear: Tympanic membrane and ear canal normal.      Left Ear: Tympanic membrane and ear canal normal.      Mouth/Throat:      Mouth: Mucous membranes are moist.      Pharynx: No oropharyngeal exudate or posterior oropharyngeal erythema. Cardiovascular:      Rate and Rhythm: Normal rate and regular rhythm. Heart sounds: No murmur heard. No friction rub. Pulmonary:      Effort: Pulmonary effort is normal. No respiratory distress. Breath sounds: No stridor. No wheezing or rhonchi. Musculoskeletal:      Cervical back: Normal range of motion. No rigidity or tenderness. Neurological:      Mental Status: He is alert. ASTHMA CONTROL TEST 8/10/2022 5/24/2022 8/28/2019 4/3/2019   In the past 4 weeks, how much of the time did your asthma keep you from getting as much done at work, school or at home? 1 1 4 3   During the past 4 weeks, how often have you had shortness of breath? 1 1 4 3   During the past 4 weeks, how often did your asthma symptoms (wheezing, coughing, shortness of breath, chest tightness or pain) wake you up at night or earlier than usual in the morning? 2 1 5 5   During the past 4 weeks, how often have you used your rescue inhaler or nebulizer medication (such as albuterol)? 2 2 4 3   How would you rate your asthma control during the past 4 weeks?  1 2 4 4   Asthma Control Test Total Score 7 7 21 18      PHQ-9  6/8/2022 4/19/2022 1/12/2022 11/11/2020 7/10/2018 6/13/2018 5/1/2018   Little interest or pleasure in doing things 1 2 0 2 1 2 2   Feeling down, depressed, or hopeless 1 1 0 2 1 1 1   Trouble falling or staying asleep, or sleeping too much 1 3 - 3 3 3 3   Feeling tired or having little energy 1 2 - 3 2 1 1   Poor appetite or overeating 1 2 - 0 1 2 3   Feeling bad about yourself - or that you are a failure or have let yourself or your family down 0 0 - 1 2 1 3   Trouble concentrating on things, such as reading the newspaper or watching television 0 0 - 3 1

## 2022-08-25 ENCOUNTER — HOSPITAL ENCOUNTER (EMERGENCY)
Age: 47
Discharge: HOME | End: 2022-08-25
Attending: EMERGENCY MEDICINE

## 2022-08-25 ENCOUNTER — APPOINTMENT (OUTPATIENT)
Dept: GENERAL RADIOLOGY | Age: 47
End: 2022-08-25

## 2022-08-25 VITALS
TEMPERATURE: 97.9 F | BODY MASS INDEX: 33.91 KG/M2 | RESPIRATION RATE: 16 BRPM | HEART RATE: 78 BPM | WEIGHT: 250 LBS | DIASTOLIC BLOOD PRESSURE: 91 MMHG | OXYGEN SATURATION: 98 % | SYSTOLIC BLOOD PRESSURE: 116 MMHG

## 2022-08-25 DIAGNOSIS — S93.402A SPRAIN OF LEFT ANKLE, UNSPECIFIED LIGAMENT, INITIAL ENCOUNTER: Primary | ICD-10-CM

## 2022-08-25 PROCEDURE — 73610 X-RAY EXAM OF ANKLE: CPT

## 2022-08-25 PROCEDURE — 99283 EMERGENCY DEPT VISIT LOW MDM: CPT

## 2022-08-25 RX ORDER — IBUPROFEN 600 MG/1
600 TABLET ORAL EVERY 6 HOURS PRN
Qty: 40 TABLET | Refills: 0 | Status: SHIPPED | OUTPATIENT
Start: 2022-08-25

## 2022-08-25 ASSESSMENT — PAIN - FUNCTIONAL ASSESSMENT: PAIN_FUNCTIONAL_ASSESSMENT: 0-10

## 2022-08-25 ASSESSMENT — PAIN SCALES - GENERAL: PAINLEVEL_OUTOF10: 10

## 2022-08-25 ASSESSMENT — PAIN DESCRIPTION - ORIENTATION: ORIENTATION: LEFT

## 2022-08-25 ASSESSMENT — PAIN DESCRIPTION - LOCATION: LOCATION: ANKLE

## 2022-08-25 NOTE — ED NOTES
Patient prepared for and ready to be discharged. Patient discharged at this time in no acute distress after verbalizing understanding of discharge instructions. Patient left after receiving After Visit Summary instructions.         Deborah Leonard RN  08/25/22 [id number]

## 2022-08-26 NOTE — ED PROVIDER NOTES
CHIEF COMPLAINT  Ankle Pain (Right ankle pain since Tuesday. Noted while playing with his son. No specific injury.)      HISTORY OF PRESENT ILLNESS  Tomy Perez  is a 49 y.o. male who presents to the ED at via private vehicle complaining of right ankle pain. Patient reports that he was playing with his son yesterday when he twisted his right ankle. Patient reports moderate to severe pain of right ankle. Swelling noted. Pain is worse with bearing weight. There are no other complaints, modifying factors or associated symptoms. Nursing notes reviewed. Past medical history:  has a past medical history of Asthma, Collar bone fracture, COVID, BIJAL (generalized anxiety disorder), blood clots, Lumbar radiculopathy (6/30/2022), Migraine, Moderate episode of recurrent major depressive disorder ([facility] [address] (05/01/2018), Obstructive sleep apnea syndrome (4/6/2021), Prolonged emergence from general anesthesia, Pulmonary emboli ([facility] [address] and Sleep apnea (2021). Past surgical history:  has a past surgical history that includes hernia repair (Right, 2009); Forearm surgery (Left, 2000); Appendectomy (2019); Colonoscopy (N/A, 3/8/2021); Umbilical hernia repair (N/A, 5/19/2022); and hernia repair (N/A, 5/19/2022). Home medications:   Prior to Admission medications    Medication Sig Start Date End Date Taking? Authorizing Provider   ibuprofen (IBU) 600 MG tablet Take 1 tablet by mouth every 6 hours as needed for Pain 8/25/22  Yes Geo Fears, DO   ARIPiprazole (ABILIFY) 5 MG tablet Take 1 tablet by mouth in the morning.  8/10/22   Earnest Miles MD   pramipexole (MIRAPEX) 0.25 MG tablet 3 TABLETS BY MOUTH AT BEDTIME 8/8/22   Jake Saavedra MD   cyclobenzaprine (FLEXERIL) 10 MG tablet TAKE 1 TABLET BY MOUTH THREE TIMES A DAY AS NEEDED FOR MUSCLE SPASMS 7/18/22   Earnest Miles MD   apixaban Shanique Balding) 5 MG TABS tablet Take 1 tablet by mouth 2 times daily 5/24/22   Earnest Miles MD LEVOCETIRIZINE DIHYDROCHLORIDE PO Take by mouth    Historical Provider, MD   lactobacillus (CULTURELLE) CAPS capsule Take 1 capsule by mouth daily 4/19/22   Molina Mabry MD   amphetamine-dextroamphetamine (ADDERALL XR) 15 MG extended release capsule Take 1 capsule by mouth daily for 30 days. 4/19/22 8/25/22  Molina Mabry MD   citalopram (CELEXA) 20 MG tablet TAKE 1 TABLET BY MOUTH EVERY DAY 4/8/22   Molina Mabry MD   Budeson-Glycopyrrol-Formoterol (BREZTRI AEROSPHERE) 160-9-4.8 MCG/ACT AERO Inhale 2 puffs into the lungs 2 times daily 3/21/22   Molina Mabry MD   Budeson-Glycopyrrol-Formoterol (BREZTRI AEROSPHERE) 160-9-4.8 MCG/ACT AERO Inhale 2 puffs into the lungs 2 times daily 3/21/22   Molina Mabry MD   valACYclovir (VALTREX) 500 MG tablet Take 1 tablet by mouth daily 11/4/21   Molina Mabry MD   amphetamine-dextroamphetamine (ADDERALL XR) 15 MG extended release capsule Take 1 capsule by mouth daily for 30 days. 12/4/21 5/24/22  Molina Mabry MD   albuterol sulfate HFA (PROVENTIL HFA) 108 (90 Base) MCG/ACT inhaler Inhale 2 puffs into the lungs every 4 hours as needed for Wheezing or Shortness of Breath 1/6/21 8/10/22  Bonifacio Gordon MD       No Known Allergies    Social history:  reports that he quit smoking about 6 years ago. His smoking use included cigarettes. He started smoking about 33 years ago. He has a 12.50 pack-year smoking history. He has never used smokeless tobacco. He reports current alcohol use of about 2.0 standard drinks per week. He reports that he does not use drugs.     Family history:    Family History   Problem Relation Age of Onset    Diabetes Mother     Diabetes Sister     Clotting Disorder Maternal Grandfather     Diabetes Sister     Clotting Disorder Father        REVIEW OF SYSTEMS  6 systems reviewed, pertinent positives per HPI otherwise noted to be negative    PHYSICAL EXAM  Vitals:    08/25/22 1815   BP: (!) 116/91   Pulse: 78   Resp: 16   Temp: 97.9 °F (36.6 °C)   SpO2: 98%       GENERAL: Patient is well-developed, well-nourished,  no acute distress. Moderate apparent discomfort. Non toxic appearing. HEENT:  Normocephalic, atraumatic. PERRL. Conjunctiva appear normal.  External ears are normal.  MMM  NECK: Supple with normal ROM. Trachea midline  LUNGS:  Normal work of breathing. Speaking comfortably in full sentences. EXTREMITIES: 2+ distal pulses w/o edema. MUSCULOSKELETAL: Right lower extremity: Hip and knee within normal limits. Patient has diffuse tenderness of the right ankle. Mild swelling. Atraumatic extremities with normal ROM grossly. No obvious bony deformities. SKIN: Warm/dry. No rashes/lesions noted. PSYCHIATRIC: Patient is alert and oriented with normal affect  NEUROLOGIC: Cranial nerves grossly intact. Moves all extremities with equal strength. No gross sensory deficits. Answers questions/follows commands appropriately. ED COURSE/MDM  Nursing notes reviewed. Pt was given the following medications or treatments in the ED:         XR ANKLE LEFT (MIN 3 VIEWS)    Result Date: 8/25/2022  EXAM: Left ankle 3 views INDICATION: pain left ankle COMPARISON: None FINDINGS: Osseous structures intact. No malalignment. No fracture           Knee immobilizer/crutches provided. Clinical Impression  Based on the presenting complaint, history, and physical exam, multiple diagnoses were considered. Exam and workup here most c/w:  1. Sprain of left ankle, unspecified ligament, initial encounter        I discussed with Tomy Perez the results of evaluation in the ED, diagnosis, care, and prognosis. The plan is to discharge to home. Patient is in agreement with plan and questions have been answered. I also discussed with Tomy Perez the reasons which may require a return visit and the importance of follow-up care. The patient is well-appearing, nontoxic, and improved at the time of discharge. Patient agrees to call to arrange follow-up care as directed. Tomy Perez understands to return immediately for worsening/change in symptoms. Patient will be started on the following medications from the ED:  Discharge Medication List as of 8/25/2022  7:35 PM        START taking these medications    Details   ibuprofen (IBU) 600 MG tablet Take 1 tablet by mouth every 6 hours as needed for Pain, Disp-40 tablet, R-0Normal               Disposition  Pt is discharged in stable condition.     Disposition Vitals:  BP (!) 116/91   Pulse 78   Temp 97.9 °F (36.6 °C) (Oral)   Resp 16   Wt 250 lb (113.4 kg)   SpO2 98%   BMI 33.91 kg/m²                    [place] Early, DO  08/26/22 0540

## 2022-08-27 ENCOUNTER — TELEPHONE (OUTPATIENT)
Dept: INTERNAL MEDICINE CLINIC | Age: 47
End: 2022-08-27

## 2022-08-27 NOTE — TELEPHONE ENCOUNTER
We just received some disability forms for Mr. Dave Durand. Please ask him if these are for his hernia surgery or his asthma.   If they are for his hernia surgery, they should be completed by Dr. Sibley Elmira

## 2022-08-29 NOTE — TELEPHONE ENCOUNTER
He stated it was for both. I told him that we can do the asthma but he will need to give a copy to Dr. Pb Shrestha for the surgery.

## 2022-08-30 ENCOUNTER — HOSPITAL ENCOUNTER (OUTPATIENT)
Dept: CARDIOLOGY | Age: 47
Discharge: HOME | End: 2022-08-30
Payer: COMMERCIAL

## 2022-08-30 DIAGNOSIS — R06.02 SHORTNESS OF BREATH: ICD-10-CM

## 2022-08-30 LAB
LV EF: 53 %
LVEF MODALITY: NORMAL

## 2022-08-30 PROCEDURE — 93306 TTE W/DOPPLER COMPLETE: CPT

## 2022-09-01 ENCOUNTER — TELEPHONE (OUTPATIENT)
Dept: PULMONOLOGY | Age: 47
End: 2022-09-01

## 2022-09-13 ENCOUNTER — TELEPHONE (OUTPATIENT)
Dept: INTERNAL MEDICINE CLINIC | Age: 47
End: 2022-09-13

## 2022-09-14 NOTE — TELEPHONE ENCOUNTER
We have received papers from [facility]. He also sent forms to Dr. Charles Delcid. Please ask patient which diagnosis he wants for the disability papers.

## 2022-09-16 ENCOUNTER — OFFICE VISIT (OUTPATIENT)
Dept: INTERNAL MEDICINE CLINIC | Age: 47
End: 2022-09-16
Payer: COMMERCIAL

## 2022-09-16 VITALS
OXYGEN SATURATION: 96 % | SYSTOLIC BLOOD PRESSURE: 108 MMHG | DIASTOLIC BLOOD PRESSURE: 68 MMHG | BODY MASS INDEX: 28 KG/M2 | WEIGHT: 242 LBS | HEART RATE: 75 BPM | HEIGHT: 78 IN

## 2022-09-16 DIAGNOSIS — F33.1 MODERATE EPISODE OF RECURRENT MAJOR DEPRESSIVE DISORDER (HCC): Primary | ICD-10-CM

## 2022-09-16 DIAGNOSIS — S99.912D INJURY OF LEFT ANKLE, SUBSEQUENT ENCOUNTER: ICD-10-CM

## 2022-09-16 DIAGNOSIS — G25.81 RLS (RESTLESS LEGS SYNDROME): ICD-10-CM

## 2022-09-16 DIAGNOSIS — F90.0 ATTENTION DEFICIT HYPERACTIVITY DISORDER (ADHD), PREDOMINANTLY INATTENTIVE TYPE: ICD-10-CM

## 2022-09-16 DIAGNOSIS — B00.9 HSV INFECTION: ICD-10-CM

## 2022-09-16 PROCEDURE — 99214 OFFICE O/P EST MOD 30 MIN: CPT | Performed by: INTERNAL MEDICINE

## 2022-09-16 PROCEDURE — G8417 CALC BMI ABV UP PARAM F/U: HCPCS | Performed by: INTERNAL MEDICINE

## 2022-09-16 PROCEDURE — 1036F TOBACCO NON-USER: CPT | Performed by: INTERNAL MEDICINE

## 2022-09-16 PROCEDURE — G8427 DOCREV CUR MEDS BY ELIG CLIN: HCPCS | Performed by: INTERNAL MEDICINE

## 2022-09-16 RX ORDER — PRAMIPEXOLE DIHYDROCHLORIDE 0.25 MG/1
TABLET ORAL
Qty: 90 TABLET | Refills: 5 | Status: SHIPPED | OUTPATIENT
Start: 2022-09-16

## 2022-09-16 RX ORDER — VALACYCLOVIR HYDROCHLORIDE 500 MG/1
500 TABLET, FILM COATED ORAL DAILY
Qty: 30 TABLET | Refills: 5 | Status: SHIPPED | OUTPATIENT
Start: 2022-09-16

## 2022-09-16 RX ORDER — ARIPIPRAZOLE 10 MG/1
10 TABLET ORAL DAILY
Qty: 30 TABLET | Refills: 5 | Status: SHIPPED | OUTPATIENT
Start: 2022-09-16 | End: 2022-09-20 | Stop reason: DRUGHIGH

## 2022-09-16 RX ORDER — ARM BRACE
EACH MISCELLANEOUS
Qty: 1 EACH | Refills: 0 | Status: SHIPPED | OUTPATIENT
Start: 2022-09-16

## 2022-09-16 RX ORDER — DEXTROAMPHETAMINE SACCHARATE, AMPHETAMINE ASPARTATE MONOHYDRATE, DEXTROAMPHETAMINE SULFATE AND AMPHETAMINE SULFATE 3.75; 3.75; 3.75; 3.75 MG/1; MG/1; MG/1; MG/1
15 CAPSULE, EXTENDED RELEASE ORAL DAILY
Qty: 30 CAPSULE | Refills: 0 | Status: SHIPPED | OUTPATIENT
Start: 2022-09-16 | End: 2022-09-20 | Stop reason: DRUGHIGH

## 2022-09-16 ASSESSMENT — ANXIETY QUESTIONNAIRES
2. NOT BEING ABLE TO STOP OR CONTROL WORRYING: 2
1. FEELING NERVOUS, ANXIOUS, OR ON EDGE: 2
6. BECOMING EASILY ANNOYED OR IRRITABLE: 3
7. FEELING AFRAID AS IF SOMETHING AWFUL MIGHT HAPPEN: 2
GAD7 TOTAL SCORE: 15
5. BEING SO RESTLESS THAT IT IS HARD TO SIT STILL: 1
IF YOU CHECKED OFF ANY PROBLEMS ON THIS QUESTIONNAIRE, HOW DIFFICULT HAVE THESE PROBLEMS MADE IT FOR YOU TO DO YOUR WORK, TAKE CARE OF THINGS AT HOME, OR GET ALONG WITH OTHER PEOPLE: EXTREMELY DIFFICULT
4. TROUBLE RELAXING: 3
3. WORRYING TOO MUCH ABOUT DIFFERENT THINGS: 2

## 2022-09-16 ASSESSMENT — PATIENT HEALTH QUESTIONNAIRE - PHQ9
SUM OF ALL RESPONSES TO PHQ9 QUESTIONS 1 & 2: 4
SUM OF ALL RESPONSES TO PHQ QUESTIONS 1-9: 18
2. FEELING DOWN, DEPRESSED OR HOPELESS: 2
3. TROUBLE FALLING OR STAYING ASLEEP: 2
7. TROUBLE CONCENTRATING ON THINGS, SUCH AS READING THE NEWSPAPER OR WATCHING TELEVISION: 2
9. THOUGHTS THAT YOU WOULD BE BETTER OFF DEAD, OR OF HURTING YOURSELF: 2
SUM OF ALL RESPONSES TO PHQ QUESTIONS 1-9: 18
10. IF YOU CHECKED OFF ANY PROBLEMS, HOW DIFFICULT HAVE THESE PROBLEMS MADE IT FOR YOU TO DO YOUR WORK, TAKE CARE OF THINGS AT HOME, OR GET ALONG WITH OTHER PEOPLE: 2
6. FEELING BAD ABOUT YOURSELF - OR THAT YOU ARE A FAILURE OR HAVE LET YOURSELF OR YOUR FAMILY DOWN: 2
5. POOR APPETITE OR OVEREATING: 2
4. FEELING TIRED OR HAVING LITTLE ENERGY: 2
1. LITTLE INTEREST OR PLEASURE IN DOING THINGS: 2
SUM OF ALL RESPONSES TO PHQ QUESTIONS 1-9: 18
SUM OF ALL RESPONSES TO PHQ QUESTIONS 1-9: 16
8. MOVING OR SPEAKING SO SLOWLY THAT OTHER PEOPLE COULD HAVE NOTICED. OR THE OPPOSITE, BEING SO FIGETY OR RESTLESS THAT YOU HAVE BEEN MOVING AROUND A LOT MORE THAN USUAL: 2

## 2022-09-16 NOTE — PROGRESS NOTES
Tomy Perez (:  1975) is a 49 y.o. male,Established patient, here for evaluation of the following chief complaint(s):  Results (Discuss echo results ) and Asthma (Follow up)         ASSESSMENT/PLAN:    1. Moderate episode of recurrent major depressive disorder (HCC)  uncontrolled  -     Ambulatory referral to Psychiatry  -     ARIPiprazole (ABILIFY) 10 MG tablet; Take 1 tablet by mouth daily, Disp-30 tablet, R-5Normal (higher dose)    2. HSV infection  -     valACYclovir (VALTREX) 500 MG tablet; Take 1 tablet by mouth daily, Disp-30 tablet, R-5Normal    3. RLS (restless legs syndrome)  -     pramipexole (MIRAPEX) 0.25 MG tablet; 3 TABLETS BY MOUTH AT BEDTIME, Disp-90 tablet, R-5Normal  4. Attention deficit hyperactivity disorder (ADHD), predominantly inattentive type  -     amphetamine-dextroamphetamine (ADDERALL XR) 15 MG extended release capsule; Take 1 capsule by mouth daily for 30 days. , Disp-30 capsule, R-0Normal      No follow-ups on file. Subjective   SUBJECTIVE/OBJECTIVE:  HPI  patient comes in for worsening depression. He has absolutely no motivation. He has been taking  The celexa and abilify without any benefit. He is concerned about his current status in life and knows  That he is not where he wants to be. He does have asthma and is scheduled for a sleep study with  Dr. Diogenes Lemons. Review of Systems       Objective   Vitals:    22 1337   BP: 108/68   Pulse: 75   SpO2: 96%   Weight: 242 lb (109.8 kg)   Height: 6' 6\" (1.981 m)      Wt Readings from Last 3 Encounters:   22 242 lb (109.8 kg)   22 250 lb (113.4 kg)   08/10/22 244 lb (110.7 kg)     BP Readings from Last 3 Encounters:   22 108/68   22 (!) 116/91   08/10/22 128/78     Body mass index is 27.97 kg/m². Facility age limit for growth percentiles is 20 years.    Physical Exam     PHQ-9  2022 2022 2022 2022 2020 7/10/2018 2018   Little interest or pleasure in doing things 2 1 2 0 2 1 2   Feeling down, depressed, or hopeless 2 1 1 0 2 1 1   Trouble falling or staying asleep, or sleeping too much 2 1 3 - 3 3 3   Feeling tired or having little energy 2 1 2 - 3 2 1   Poor appetite or overeating 2 1 2 - 0 1 2   Feeling bad about yourself - or that you are a failure or have let yourself or your family down 2 0 0 - 1 2 1   Trouble concentrating on things, such as reading the newspaper or watching television 2 0 0 - 3 1 1   Moving or speaking so slowly that other people could have noticed. Or the opposite - being so fidgety or restless that you have been moving around a lot more than usual 2 1 2 - 0 0 1   Thoughts that you would be better off dead, or of hurting yourself in some way 2 0 1 - 0 1 1   PHQ-2 Score 4 2 3 0 4 2 3   PHQ-9 Total Score 18 6 13 0 14 12 13   If you checked off any problems, how difficult have these problems made it for you to do your work, take care of things at home, or get along with other people? 2 1 1 - 3 1 1     Interpretation of Total Score Total Score Depression Severity: 1-4 = Minimal depression, 5-9 = Mild depression, 10-14 = Moderate depression, 15-19 = Moderately severe depression, 20-27 = Severe depression         An electronic signature was used to authenticate this note.     --Oscar Zhang MD

## 2022-09-19 ENCOUNTER — TELEPHONE (OUTPATIENT)
Dept: INTERNAL MEDICINE CLINIC | Age: 47
End: 2022-09-19

## 2022-09-19 NOTE — TELEPHONE ENCOUNTER
----- Message from Beto Adame sent at 9/19/2022  2:33 PM EDT -----  Subject: Message to Provider    QUESTIONS  Information for Provider? Pt of Dr. Trinity Spoon called to advise disability   paperwork has not been received by his short term disability company. Stated needs to be completed by 9/24 or his case will be closed. ---------------------------------------------------------------------------  --------------  Pastor Barillas [facility]  [phone]; OK to leave message on voicemail  ---------------------------------------------------------------------------  --------------  SCRIPT ANSWERS  Relationship to Patient?  Self

## 2022-09-19 NOTE — TELEPHONE ENCOUNTER
----- Message from Terrell Deters sent at 9/19/2022  2:33 PM EDT -----  Subject: Message to Provider    QUESTIONS  Information for Provider? Pt of Dr. Sterling Goldberg called to advise disability   paperwork has not been received by his short term disability company. Stated needs to be completed by 9/24 or his case will be closed. ---------------------------------------------------------------------------  --------------  Rona ABDULLAHI  [phone]; OK to leave message on voicemail  ---------------------------------------------------------------------------  --------------  SCRIPT ANSWERS  Relationship to Patient?  Self

## 2022-09-19 NOTE — TELEPHONE ENCOUNTER
His echocardiogram was normal did not show any signs of heart failure. Did complete his paperwork for disability and fax that today. I think we need to have him follow-up with Dr. Triston Pond who is the pulmonologist.  In addition we will have him follow-up with a psychiatrist to help with his mood.

## 2022-09-19 NOTE — TELEPHONE ENCOUNTER
Paperwork was completed this weekend and placed in the band. Please fax to his short-term disability provider. Please also let patient know that he provided short-term disability forms for all of his doctors so he should confirm that they have faxed there is as well.

## 2022-09-20 ENCOUNTER — OFFICE VISIT (OUTPATIENT)
Dept: PSYCHIATRY | Age: 47
End: 2022-09-20
Payer: COMMERCIAL

## 2022-09-20 VITALS
BODY MASS INDEX: 29.5 KG/M2 | RESPIRATION RATE: 16 BRPM | WEIGHT: 255 LBS | HEART RATE: 56 BPM | OXYGEN SATURATION: 98 % | SYSTOLIC BLOOD PRESSURE: 118 MMHG | DIASTOLIC BLOOD PRESSURE: 70 MMHG | HEIGHT: 78 IN

## 2022-09-20 DIAGNOSIS — F33.1 MODERATE EPISODE OF RECURRENT MAJOR DEPRESSIVE DISORDER (HCC): Primary | ICD-10-CM

## 2022-09-20 DIAGNOSIS — F90.2 ATTENTION DEFICIT HYPERACTIVITY DISORDER (ADHD), COMBINED TYPE: ICD-10-CM

## 2022-09-20 PROCEDURE — 99205 OFFICE O/P NEW HI 60 MIN: CPT | Performed by: PSYCHIATRY & NEUROLOGY

## 2022-09-20 PROCEDURE — G8427 DOCREV CUR MEDS BY ELIG CLIN: HCPCS | Performed by: PSYCHIATRY & NEUROLOGY

## 2022-09-20 PROCEDURE — G8417 CALC BMI ABV UP PARAM F/U: HCPCS | Performed by: PSYCHIATRY & NEUROLOGY

## 2022-09-20 PROCEDURE — 1036F TOBACCO NON-USER: CPT | Performed by: PSYCHIATRY & NEUROLOGY

## 2022-09-20 RX ORDER — DEXTROAMPHETAMINE SACCHARATE, AMPHETAMINE ASPARTATE MONOHYDRATE, DEXTROAMPHETAMINE SULFATE AND AMPHETAMINE SULFATE 5; 5; 5; 5 MG/1; MG/1; MG/1; MG/1
20 CAPSULE, EXTENDED RELEASE ORAL EVERY MORNING
Qty: 30 CAPSULE | Refills: 0 | Status: SHIPPED | OUTPATIENT
Start: 2022-10-20 | End: 2022-11-19

## 2022-09-20 RX ORDER — DEXTROAMPHETAMINE SACCHARATE, AMPHETAMINE ASPARTATE MONOHYDRATE, DEXTROAMPHETAMINE SULFATE AND AMPHETAMINE SULFATE 5; 5; 5; 5 MG/1; MG/1; MG/1; MG/1
20 CAPSULE, EXTENDED RELEASE ORAL EVERY MORNING
Qty: 30 CAPSULE | Refills: 0 | Status: SHIPPED | OUTPATIENT
Start: 2022-09-20 | End: 2022-10-20

## 2022-09-20 ASSESSMENT — PATIENT HEALTH QUESTIONNAIRE - PHQ9
SUM OF ALL RESPONSES TO PHQ QUESTIONS 1-9: 17
SUM OF ALL RESPONSES TO PHQ QUESTIONS 1-9: 16
8. MOVING OR SPEAKING SO SLOWLY THAT OTHER PEOPLE COULD HAVE NOTICED. OR THE OPPOSITE, BEING SO FIGETY OR RESTLESS THAT YOU HAVE BEEN MOVING AROUND A LOT MORE THAN USUAL: 0
5. POOR APPETITE OR OVEREATING: 3
6. FEELING BAD ABOUT YOURSELF - OR THAT YOU ARE A FAILURE OR HAVE LET YOURSELF OR YOUR FAMILY DOWN: 2
10. IF YOU CHECKED OFF ANY PROBLEMS, HOW DIFFICULT HAVE THESE PROBLEMS MADE IT FOR YOU TO DO YOUR WORK, TAKE CARE OF THINGS AT HOME, OR GET ALONG WITH OTHER PEOPLE: 3
2. FEELING DOWN, DEPRESSED OR HOPELESS: 2
3. TROUBLE FALLING OR STAYING ASLEEP: 2
9. THOUGHTS THAT YOU WOULD BE BETTER OFF DEAD, OR OF HURTING YOURSELF: 1
SUM OF ALL RESPONSES TO PHQ QUESTIONS 1-9: 17
7. TROUBLE CONCENTRATING ON THINGS, SUCH AS READING THE NEWSPAPER OR WATCHING TELEVISION: 3
1. LITTLE INTEREST OR PLEASURE IN DOING THINGS: 2
SUM OF ALL RESPONSES TO PHQ QUESTIONS 1-9: 17
SUM OF ALL RESPONSES TO PHQ9 QUESTIONS 1 & 2: 4
4. FEELING TIRED OR HAVING LITTLE ENERGY: 2

## 2022-09-20 ASSESSMENT — ANXIETY QUESTIONNAIRES
2. NOT BEING ABLE TO STOP OR CONTROL WORRYING: 2
3. WORRYING TOO MUCH ABOUT DIFFERENT THINGS: 3
IF YOU CHECKED OFF ANY PROBLEMS ON THIS QUESTIONNAIRE, HOW DIFFICULT HAVE THESE PROBLEMS MADE IT FOR YOU TO DO YOUR WORK, TAKE CARE OF THINGS AT HOME, OR GET ALONG WITH OTHER PEOPLE: VERY DIFFICULT
6. BECOMING EASILY ANNOYED OR IRRITABLE: 3
4. TROUBLE RELAXING: 3
7. FEELING AFRAID AS IF SOMETHING AWFUL MIGHT HAPPEN: 3
5. BEING SO RESTLESS THAT IT IS HARD TO SIT STILL: 2
GAD7 TOTAL SCORE: 18
1. FEELING NERVOUS, ANXIOUS, OR ON EDGE: 2

## 2022-09-20 NOTE — PROGRESS NOTES
PSYCHIATRY INITIAL EVALUATION    Tomy Perez, : 21    CC: Depression      HPI:   context: 50 yo M with hx of depression, ADHD, presenting as a referral from Dr. Sanchez. associated symptoms: depressed mood, anhedonia, fatigue, lack of motivation and drive, difficulty initiating sleep. Can feel hopeless and question his life like \"whats the point\" . Denies any plan or intent for self harm. Feels easily frustrated and irritated by surroundings. Multiple complaints about inattention. Loses track of objects and thoughts easily, jumps from one task to another, has difficulty following through with tasks. Feels his mind is \"all over the place\" and wanders easily. He gives examples of simple often boring house chores being half done (starting the dishes and not finishing them, cutting the grass but not finishing it). Legs are often restless when trying to sleep. RLS med help. Legs can still be restless when trying to sit still during the day. modifying factors:   Seeing Dr. Diogenes Lemons for sleep issues and dx with VU. Has been on abilify for a couple months - maybe helped with energy a bit, but not the overall picture of his depression. Causes some dizziness. He has had periods of non-adherence with his meds. Often stops both citalopram and adderall simulatneously and feels much worse loss of motivation, irritability, frustration. Has issues with the people in his job. There is a lack of diversity there and he feels subjected to racial discrimination. Caring for his mother - she wanted out of her NH in march. Claus Melgar doesn't do a good job with her. Also feels meals on wheels isn't always reliable so he will cook for her at times. Has to bathe her, look after her. Doesn't get much of any help from sisters. COVID in  and has feels he's had long term symptoms of SOB, fatigue from this.      He's been off work for almost 1 yr d/t COVID, resp issues, depression    In  he found out about his real father and found out he has 6 brothers, 1 sister. Timing: subacute on chronic  duration: had a bad episode of depression due to multiple social stressors about 20 yrs ago. He's been dealing with current episode for a couple yrs, worse since Jan  severity: moderate to severe    ROS:   GEN: no fevers or chills HEENT: no vision or hearing prob CV: no cp, no palpitations RESP: +SOB : no dysuria MSK: no muscle or joint pain GI: no n/v, no abd pain Skin: no rashes  NEURO: no tremors, +RLS ENDO: no weight changes    Past Psychiatric History:   Hosp: denies  Diagnoses: MDD, ADHD  Med trials: citalopram 20mg daily (since 2018), adderall XR 15mg daily (2021), aripiprazole, vyvanse up to 50mg (2020, 2021) - stopped d/t cost.    Outpt: Saw Dr. Jade Pulliam a couple times 2018, 2020. She initially assessed ADHD and dx with MDD, ADHD  NSSI: denies  Suicide Attempts: denies    Past Medical History:   Diagnosis Date    Asthma     since P.E. Collar bone fracture     COVID     BIJAL (generalized anxiety disorder)     Hx of blood clots     Lumbar radiculopathy 6/30/2022    Migraine     Moderate episode of recurrent major depressive disorder ([facility] [address] 05/01/2018    Obstructive sleep apnea syndrome 4/6/2021    Prolonged emergence from general anesthesia     Pulmonary emboli ([facility] [address]     2017, 2018    Sleep apnea 2021    just dx'ed; getting CPAP     Social History:   Marital:  once 24 yo for 2 yrs. Wife cheated on him with best friend  Relationship: currently single  Occ: works as a  at [employer]. He has been out on disability for almost 1 yr  Hobbies: fishing  Siblings: 2 older sisters. Recent found out he has 6 brothers and 1 sister from father's side. Children: 10 yo son with shared custody with child's mother. 24 yo daughter from a different relationship. Substance Abuse History:  ETOH: drinks about 2days/wk up to a couple shots with some beers, at the most 1/2 fifth per week.  A few months ago was drinking daily several shots per night, a fifth would last a few days. He did this for about 1 month and was the heaviest he has ever drank. Tobacco: denies, quit 10 yrs ago after starting at age 12  Illicits: occasionally MJ edibles, maybe 1x/mo. Used to use more often in the past    Family History   Problem Relation Age of Onset    Diabetes Mother     Diabetes Sister     Clotting Disorder Maternal Grandfather     Diabetes Sister     Clotting Disorder Father      No Known Allergies  Current Outpatient Medications on File Prior to Visit   Medication Sig Dispense Refill    valACYclovir (VALTREX) 500 MG tablet Take 1 tablet by mouth daily 30 tablet 5    pramipexole (MIRAPEX) 0.25 MG tablet 3 TABLETS BY MOUTH AT BEDTIME 90 tablet 5    Elastic Bandages & Supports (ACE ANKLE BRACE LARGE) MISC Please wear brace on Left ankle while awake. 1 each 0    ibuprofen (IBU) 600 MG tablet Take 1 tablet by mouth every 6 hours as needed for Pain 40 tablet 0    cyclobenzaprine (FLEXERIL) 10 MG tablet TAKE 1 TABLET BY MOUTH THREE TIMES A DAY AS NEEDED FOR MUSCLE SPASMS 60 tablet 0    apixaban (ELIQUIS) 5 MG TABS tablet Take 1 tablet by mouth 2 times daily 180 tablet 3    LEVOCETIRIZINE DIHYDROCHLORIDE PO Take by mouth      lactobacillus (CULTURELLE) CAPS capsule Take 1 capsule by mouth daily 30 capsule 3    citalopram (CELEXA) 20 MG tablet TAKE 1 TABLET BY MOUTH EVERY DAY 90 tablet 2    Budeson-Glycopyrrol-Formoterol (BREZTRI AEROSPHERE) 160-9-4.8 MCG/ACT AERO Inhale 2 puffs into the lungs 2 times daily 2 each 0    Budeson-Glycopyrrol-Formoterol (BREZTRI AEROSPHERE) 160-9-4.8 MCG/ACT AERO Inhale 2 puffs into the lungs 2 times daily 10.7 g 11    albuterol sulfate HFA (PROVENTIL HFA) 108 (90 Base) MCG/ACT inhaler Inhale 2 puffs into the lungs every 4 hours as needed for Wheezing or Shortness of Breath 1 Inhaler 11     No current facility-administered medications on file prior to visit.        OBJECTIVE:  Vitals:    09/20/22 0844   BP: 118/70   Pulse: 56   Resp: 16   SpO2: 98%   Weight: 255 lb (115.7 kg)     PHQ Scores 9/20/2022 9/16/2022 6/8/2022 4/19/2022 1/12/2022 11/11/2020 7/10/2018   PHQ2 Score 4 4 2 3 0 4 2   PHQ9 Score 17 18 6 13 0 14 12     BIJAL 7 SCORE 9/20/2022 9/16/2022 5/1/2018   BIJAL-7 Total Score 18 15 -   BIJAL-7 Total Score - - 12     MSE:   Elinor    casually dressed, appropriately groomed  Mo: No abnormal movements, tics or mannerisms. Sp:  normal rate, rhythm, and vol  Lang:  no aphasia  Mood/Aff   depressed / constricted  TP   linear  TC    no delusions, no suicidal ideation  Ass  logical connections  Att/Conc   intact  Or    AxOx4  Mem   grossly intact to recent and remote content  REID    good  I/J:  good / good    Labs:      Latest Reference Range & Units 4/19/22 10:18   Hemoglobin A1C See comment % 5.6      Latest Reference Range & Units 4/19/22 10:18   Albumin 3.4 - 5.0 g/dL 4.9   Albumin/Globulin Ratio 1.1 - 2.2  1.9   Alk Phos 40 - 129 U/L 65   ALT 10 - 40 U/L 37   AST 15 - 37 U/L 21   Bilirubin 0.0 - 1.0 mg/dL 0.4      Latest Reference Range & Units 1/5/21 15:23   CHOLESTEROL, TOTAL, [id number] 0 - 199 mg/dL 206 (H)   HDL Cholesterol 40 - 60 mg/dL 42   LDL Calculated <100 mg/dL 146 (H)   Triglycerides 0 - 150 mg/dL 92   (H): Data is abnormally high     Latest Reference Range & Units 4/19/22 10:18   Sodium 136 - 145 mmol/L 140   Potassium 3.5 - 5.1 mmol/L 4.7   Chloride 99 - 110 mmol/L 102   CO2 21 - 32 mmol/L 26   BUN,BUNPL 7 - 20 mg/dL 14   Creatinine 0.9 - 1.3 mg/dL 1.3   Glucose, Random 70 - 99 mg/dL 102 (H)   CALCIUM, SERUM, 648180 8.3 - 10.6 mg/dL 9.7   (H): Data is abnormally high     Latest Reference Range & Units 1/5/21 15:23   TSH 0.27 - 4.20 uIU/mL 1.82     Imaging: no head imaging on file    ASSESSMENT:   50 yo M with depression, ADHD. Doesn't seem the aripiprazole has had a big impact at this point.  He may get better control of his overall mood and day to day structure and organization if we optimize his ADHD treatment. He still has a lot of ADHD symptoms - these can certainly also be influenced by his depression, VU, and are certainly impacted by changes in his daily structure (not working, caring for mother, no set schedule on days he is managing his 10 yo son). Per [place] he doesn't seem very adherent with his adderall over the last yr. Major depressive disorder, recurrent, moderate  Attention Deficit Hyperactive Disorder, combined type    PLAN:   1. Increase adderall XR to 20mg daily  2. D/c aripiprazole (still taking 5mg, hasn't increased to 10mg yet)  3. Continue citalopram 20mg daily. We may try increasing to 40mg next visit depending on response to adderall XR change  4. F/u with Dr. Carolyn Veloz as planned  5. Maintain med adherence with all medications. Medication Monitoring:    - [place]AR[place] reviewed. No issues noted      Follow-up: RTC in 1 month to monitor response to above changes    I spent 70 min on this encounter including face to face time, documentation and orders.      Bella Almaraz MD   Psychiatry

## 2022-09-27 ENCOUNTER — TELEPHONE (OUTPATIENT)
Dept: PULMONOLOGY | Age: 47
End: 2022-09-27

## 2022-09-27 NOTE — TELEPHONE ENCOUNTER
[employer] called, requesting Dr Angel Meza sign off on disability form that was faxed to us on 9/19, incoming fax is scanned in media. Fax number is [phone]. Phone number is [phone]. Reference number is [id number].

## 2022-09-28 ENCOUNTER — TELEPHONE (OUTPATIENT)
Dept: SLEEP CENTER | Age: 47
End: 2022-09-28

## 2022-09-28 NOTE — TELEPHONE ENCOUNTER
2nd time leaving a vm to get insurance information - needs updated for approval for sleep study. Asked pt to call us back. The [facility] plan [id number] is inactive. Please provide the patient's updated insurance card to ensure submission of the PSG in a timely manner.

## 2022-10-03 ENCOUNTER — TELEPHONE (OUTPATIENT)
Dept: PULMONOLOGY | Age: 47
End: 2022-10-03

## 2022-10-03 NOTE — TELEPHONE ENCOUNTER
from [address] [facility] asking to be sent \"verification of the last date of service\". Phone number he gave was [phone]. Ref number is [id number].

## 2022-10-03 NOTE — TELEPHONE ENCOUNTER
Tried to contact [employer] and [facility] [employer] to see what are they needs,. JOLIE.  10/03/22 - 4:40 PM

## 2022-10-04 NOTE — TELEPHONE ENCOUNTER
Called [facility], spoke with Coco Ponce and she said that we said we don't have any records of this pt, I said I am not no what she saying bc we contact mela and explained that we fax to [facility] the requested and also sent to the faxes to [employer] to prove it., She said they tried to contact [facility] but until today they didn't received any documents. I said I will request [employer] again.  Sent:10/04/22

## 2022-11-09 ENCOUNTER — OFFICE VISIT (OUTPATIENT)
Dept: PSYCHOLOGY | Age: 47
End: 2022-11-09
Payer: COMMERCIAL

## 2022-11-09 DIAGNOSIS — F33.1 MODERATE EPISODE OF RECURRENT MAJOR DEPRESSIVE DISORDER (HCC): Primary | Chronic | ICD-10-CM

## 2022-11-09 PROCEDURE — 90791 PSYCH DIAGNOSTIC EVALUATION: CPT | Performed by: PSYCHOLOGIST

## 2022-11-09 PROCEDURE — 1036F TOBACCO NON-USER: CPT | Performed by: PSYCHOLOGIST

## 2022-11-09 NOTE — PROGRESS NOTES
Behavioral Health Consultation  Naeem Shukla, Ph.D.  Psychologist  11/9/2022   4:26 PM EST       Time spent with Patient: 30 minutes      Reason for Consult:    Chief Complaint   Patient presents with    Depression         Pt provided informed consent for the behavioral health program. Discussed with patient model of service to include the limits of confidentiality (i.e. abuse reporting, suicide  intervention, etc.) and short-term intervention focused approach. Pt indicated understanding. Feedback given to PCP. S:  Pt seen per PCP re: anxiety    Patient seen for reengagement of care. Patient continues to describe symptoms consistent with major depressive disorder with a moderate presentation. .  Patient specifically endorsed depressed mood, deactivation, anhedonia,  insomnia, fatigue, psychomotor retardation, and poor concentration/focus. Patient reported that he has not been able to work since having [employer]. Patient noted that he still cares for his son. Patient reported some positive but still stressful new situations. Patient noted that he found out who his father was and that he has multiple siblings. Patient stated how this led to a grief response that he did not know them for all of these years. Patient has been diagnosed with sleep apnea. Patient noted that he has been very tired throughout the day and that this affects his ability to engage in coping. Patient does not wear his sleep apnea machine. Focus visit on MI for adherence and behavioral engagement.   Patient set goal to call sleep medicine provider to discuss if there is an alternative head care that he can wear      O:  MSE:    Appearance: good hygiene   Attitude: cooperative and friendly  Consciousness: alert  Orientation: oriented to person, place, time, general circumstance  Memory: recent and remote memory intact  Attention/Concentration: lost train of thought while speaking  Psychomotor Activity:normal  Eye Contact: normal  Speech: normal rate and volume, well-articulated  Mood: euthymic  Affect: congruent  Perception: within normal limits  Thought Content: within normal limits  Thought Process: logical, coherent  Insight: good  Judgment: intact  Ability to understand instructions: Yes  Morbid Ideation: no   Suicide Assessment: no suicidal ideation, plan, or intent  Homicidal Ideation: no     History:    Social History:   Social History     Socioeconomic History    Marital status: Single     Spouse name: Not on file    Number of children: 2    Years of education: Not on file    Highest education level: Not on file   Occupational History    Occupation: [employer]    Tobacco Use    Smoking status: Former     Packs/day: 0.50     Years: 25.00     Pack years: 12.50     Types: Cigarettes     Start date: 1989     Quit date: 2015     Years since quittin.1    Smokeless tobacco: Never    Tobacco comments:     started to smoke at 14 / smoked up to 1 to 1.5 p.p.d / quit smoking 2015   Vaping Use    Vaping Use: Former    Substances: Always   Substance and Sexual Activity    Alcohol use: Yes     Alcohol/week: 2.0 standard drinks     Types: 2 Cans of beer per week     Comment: socially    Drug use: No    Sexual activity: Yes     Partners: Female     Comment: 1 child   Other Topics Concern    Not on file   Social History Narrative    Lives with mother-December 10, 2020      Social Determinants of Health     Financial Resource Strain: Low Risk     Difficulty of Paying Living Expenses: Not hard at all   Food Insecurity: No Food Insecurity    Worried About Running Out of Food in the Last Year: Never true    Ran Out of Food in the Last Year: Never true   Transportation Needs: Not on file   Physical Activity: Not on file   Stress: Not on file   Social Connections: Not on file   Intimate Partner Violence: Not on file   Housing Stability: Not on file     TOBACCO:   reports that he quit smoking about 7 years ago.  His smoking use included cigarettes. He started smoking about 33 years ago. He has a 12.50 pack-year smoking history. He has never used smokeless tobacco.  ETOH:   reports current alcohol use of about 2.0 standard drinks per week. A:  Patient engaged and cooperative. denies SI. Insight and motivation are good. Diagnosis:    1. Moderate episode of recurrent major depressive disorder ([facility]            Plan:    There are no Patient Instructions on file for this visit. Pt interventions:    See above  No follow-ups on file. Documentation was done using voice recognition dragon software. Every effort was made to ensure accuracy; however, inadvertent, unintentional computerized transcription errors may be present.

## 2022-11-15 ENCOUNTER — OFFICE VISIT (OUTPATIENT)
Dept: PSYCHIATRY | Age: 47
End: 2022-11-15
Payer: COMMERCIAL

## 2022-11-15 VITALS
DIASTOLIC BLOOD PRESSURE: 70 MMHG | SYSTOLIC BLOOD PRESSURE: 124 MMHG | BODY MASS INDEX: 29.62 KG/M2 | OXYGEN SATURATION: 98 % | RESPIRATION RATE: 16 BRPM | HEIGHT: 78 IN | WEIGHT: 256 LBS | HEART RATE: 56 BPM

## 2022-11-15 DIAGNOSIS — F90.2 ATTENTION DEFICIT HYPERACTIVITY DISORDER (ADHD), COMBINED TYPE: ICD-10-CM

## 2022-11-15 DIAGNOSIS — F32.1 MODERATE SINGLE CURRENT EPISODE OF MAJOR DEPRESSIVE DISORDER (HCC): ICD-10-CM

## 2022-11-15 PROCEDURE — G8484 FLU IMMUNIZE NO ADMIN: HCPCS | Performed by: PSYCHIATRY & NEUROLOGY

## 2022-11-15 PROCEDURE — G8417 CALC BMI ABV UP PARAM F/U: HCPCS | Performed by: PSYCHIATRY & NEUROLOGY

## 2022-11-15 PROCEDURE — 99214 OFFICE O/P EST MOD 30 MIN: CPT | Performed by: PSYCHIATRY & NEUROLOGY

## 2022-11-15 PROCEDURE — G8427 DOCREV CUR MEDS BY ELIG CLIN: HCPCS | Performed by: PSYCHIATRY & NEUROLOGY

## 2022-11-15 PROCEDURE — 1036F TOBACCO NON-USER: CPT | Performed by: PSYCHIATRY & NEUROLOGY

## 2022-11-15 RX ORDER — CITALOPRAM 20 MG/1
40 TABLET ORAL NIGHTLY
Qty: 60 TABLET | Refills: 2 | Status: SHIPPED | OUTPATIENT
Start: 2022-11-15

## 2022-11-15 RX ORDER — DEXTROAMPHETAMINE SACCHARATE, AMPHETAMINE ASPARTATE MONOHYDRATE, DEXTROAMPHETAMINE SULFATE AND AMPHETAMINE SULFATE 5; 5; 5; 5 MG/1; MG/1; MG/1; MG/1
20 CAPSULE, EXTENDED RELEASE ORAL EVERY MORNING
Qty: 30 CAPSULE | Refills: 0 | Status: SHIPPED | OUTPATIENT
Start: 2022-11-28 | End: 2022-12-28

## 2022-11-15 ASSESSMENT — PATIENT HEALTH QUESTIONNAIRE - PHQ9
SUM OF ALL RESPONSES TO PHQ QUESTIONS 1-9: 13
1. LITTLE INTEREST OR PLEASURE IN DOING THINGS: 2
SUM OF ALL RESPONSES TO PHQ QUESTIONS 1-9: 13
9. THOUGHTS THAT YOU WOULD BE BETTER OFF DEAD, OR OF HURTING YOURSELF: 0
2. FEELING DOWN, DEPRESSED OR HOPELESS: 1
SUM OF ALL RESPONSES TO PHQ9 QUESTIONS 1 & 2: 3
SUM OF ALL RESPONSES TO PHQ QUESTIONS 1-9: 13
10. IF YOU CHECKED OFF ANY PROBLEMS, HOW DIFFICULT HAVE THESE PROBLEMS MADE IT FOR YOU TO DO YOUR WORK, TAKE CARE OF THINGS AT HOME, OR GET ALONG WITH OTHER PEOPLE: 3
4. FEELING TIRED OR HAVING LITTLE ENERGY: 3
8. MOVING OR SPEAKING SO SLOWLY THAT OTHER PEOPLE COULD HAVE NOTICED. OR THE OPPOSITE, BEING SO FIGETY OR RESTLESS THAT YOU HAVE BEEN MOVING AROUND A LOT MORE THAN USUAL: 0
SUM OF ALL RESPONSES TO PHQ QUESTIONS 1-9: 13
5. POOR APPETITE OR OVEREATING: 1
3. TROUBLE FALLING OR STAYING ASLEEP: 3
7. TROUBLE CONCENTRATING ON THINGS, SUCH AS READING THE NEWSPAPER OR WATCHING TELEVISION: 2
6. FEELING BAD ABOUT YOURSELF - OR THAT YOU ARE A FAILURE OR HAVE LET YOURSELF OR YOUR FAMILY DOWN: 1

## 2022-11-15 ASSESSMENT — ANXIETY QUESTIONNAIRES
3. WORRYING TOO MUCH ABOUT DIFFERENT THINGS: 1
1. FEELING NERVOUS, ANXIOUS, OR ON EDGE: 1
5. BEING SO RESTLESS THAT IT IS HARD TO SIT STILL: 2
4. TROUBLE RELAXING: 2
2. NOT BEING ABLE TO STOP OR CONTROL WORRYING: 1
6. BECOMING EASILY ANNOYED OR IRRITABLE: 2
7. FEELING AFRAID AS IF SOMETHING AWFUL MIGHT HAPPEN: 1
IF YOU CHECKED OFF ANY PROBLEMS ON THIS QUESTIONNAIRE, HOW DIFFICULT HAVE THESE PROBLEMS MADE IT FOR YOU TO DO YOUR WORK, TAKE CARE OF THINGS AT HOME, OR GET ALONG WITH OTHER PEOPLE: EXTREMELY DIFFICULT
GAD7 TOTAL SCORE: 10

## 2022-11-15 NOTE — PROGRESS NOTES
PSYCHIATRY PROGRESS NOTE    Tomy Perez : 1975  11/15/22  PCP: Sandor Fallon MD    Chief Complaint   Patient presents with    Depression       S:   Pt was not able to get the adderall filled for several weeks due to insurance coverage issues. Finally got it about 2-3 weeks and and started, but isn't always consistent with it. Since taking it has noticed improvement with his ability to focus, follow through with tasks, complete his task lists, being a little more clear and linear in his conversations and speech. On day he is productive he feels better with his mood. He is having more good days with his mood. He can feel a little ramped up on it but still very fatigued. Overall he feels he tolerates it ok. Patient continues, however, to deal with depressed mood, a sense of feeling \"whats the point\" or very discouraged in regards to his future. He can feel irritable at times. His energy is poor and his sleep is disrupted - hard to initiate and maintain sleep, feels restless and wakes several times in the night. He had to reschedule his sleep study for early December. There has been some times he had anorgasmia with the citalopram.     ROS: no headaches, vision problems, dysuria, abd pain, chest pain or SOB    Current Psychiatric Medications: Adderall XR 20mg daily  Citalopram 20mg daily    O:  Vitals:    11/15/22 1034   BP: 124/70   Pulse: 56   Resp: 16   SpO2: 98%   Weight: 256 lb (116.1 kg)       Mental Status Exam:   Appearance   casually dressed, appropriately groomed  Motor: No abnormal movements, tics or mannerisms.   Speech    normal rate, rhythm, and vol  Mood/Affect    Depressed / blunted  Thought Content no delusions, no suicidal ideation  Thought Process linear  Associations    logical connections  Attention/Concentration    intact  Memory    recent and remote memory intact  Insight/Judgement    Good / Intact    Labs:     Hospital Outpatient Visit on 2022   Component Date Value Ref Range Status    Left Ventricular Ejection Fraction 08/30/2022 53   Final-Edited    LVEF MODALITY 08/30/2022 ECHO   Final-Edited         A:  48 yo M with depression, ADHD. Doing a little better with focus and mood with adderall but hasn't been on it for that long yet. Still with depression playing a role and may benefit from more citalopram. Untreated VU and disrupted sleep is impacting his daytime function. Still on leave from [employer] as a  - given fatigue, concentration issues it would be unsafe for him to return to a job as there could be many potential safety issues. MDD, recurrent, moderate  ADHD, combined type    P:   Continue adderall XR 20mg daily  Increase citalopram to 40mg daily. Can try taking QHS. Discussed r/b/se. Monitor sexual SEs. Continue to monitor alcohol use  Continue f/u with Dr. Carole Galeano   Repeat PSG and treatment for VU pending.      Follow-up:RTC in 4-6 weeks      Ankit Phillips MD  Psychiatrist

## 2022-12-01 ENCOUNTER — OFFICE VISIT (OUTPATIENT)
Dept: PSYCHOLOGY | Age: 47
End: 2022-12-01

## 2022-12-01 DIAGNOSIS — F33.1 MODERATE EPISODE OF RECURRENT MAJOR DEPRESSIVE DISORDER (HCC): Primary | Chronic | ICD-10-CM

## 2022-12-01 ASSESSMENT — ANXIETY QUESTIONNAIRES
IF YOU CHECKED OFF ANY PROBLEMS ON THIS QUESTIONNAIRE, HOW DIFFICULT HAVE THESE PROBLEMS MADE IT FOR YOU TO DO YOUR WORK, TAKE CARE OF THINGS AT HOME, OR GET ALONG WITH OTHER PEOPLE: EXTREMELY DIFFICULT
GAD7 TOTAL SCORE: 15
3. WORRYING TOO MUCH ABOUT DIFFERENT THINGS: 2
4. TROUBLE RELAXING: 3
1. FEELING NERVOUS, ANXIOUS, OR ON EDGE: 2
2. NOT BEING ABLE TO STOP OR CONTROL WORRYING: 2
6. BECOMING EASILY ANNOYED OR IRRITABLE: 2
5. BEING SO RESTLESS THAT IT IS HARD TO SIT STILL: 2
7. FEELING AFRAID AS IF SOMETHING AWFUL MIGHT HAPPEN: 2

## 2022-12-01 ASSESSMENT — PATIENT HEALTH QUESTIONNAIRE - PHQ9
5. POOR APPETITE OR OVEREATING: 2
SUM OF ALL RESPONSES TO PHQ QUESTIONS 1-9: 15
8. MOVING OR SPEAKING SO SLOWLY THAT OTHER PEOPLE COULD HAVE NOTICED. OR THE OPPOSITE, BEING SO FIGETY OR RESTLESS THAT YOU HAVE BEEN MOVING AROUND A LOT MORE THAN USUAL: 0
7. TROUBLE CONCENTRATING ON THINGS, SUCH AS READING THE NEWSPAPER OR WATCHING TELEVISION: 3
SUM OF ALL RESPONSES TO PHQ QUESTIONS 1-9: 15
4. FEELING TIRED OR HAVING LITTLE ENERGY: 3
6. FEELING BAD ABOUT YOURSELF - OR THAT YOU ARE A FAILURE OR HAVE LET YOURSELF OR YOUR FAMILY DOWN: 1
10. IF YOU CHECKED OFF ANY PROBLEMS, HOW DIFFICULT HAVE THESE PROBLEMS MADE IT FOR YOU TO DO YOUR WORK, TAKE CARE OF THINGS AT HOME, OR GET ALONG WITH OTHER PEOPLE: 3
SUM OF ALL RESPONSES TO PHQ QUESTIONS 1-9: 14
2. FEELING DOWN, DEPRESSED OR HOPELESS: 3
9. THOUGHTS THAT YOU WOULD BE BETTER OFF DEAD, OR OF HURTING YOURSELF: 1
SUM OF ALL RESPONSES TO PHQ QUESTIONS 1-9: 15
3. TROUBLE FALLING OR STAYING ASLEEP: 2

## 2022-12-01 NOTE — PROGRESS NOTES
Behavioral Health Consultation  Sarah Castañeda, Ph.D.  Psychologist  12/1/2022   4:26 PM EST       Time spent with Patient: 30 minutes      Reason for Consult:    Chief Complaint   Patient presents with    Depression         Pt provided informed consent for the behavioral health program. Discussed with patient model of service to include the limits of confidentiality (i.e. abuse reporting, suicide  intervention, etc.) and short-term intervention focused approach. Pt indicated understanding. Feedback given to PCP. S:  Pt seen per PCP re: anxiety    Pt seen for f/u. Pt reported that he has been struggling with fatigue and depression. Pt noted that he has had difficulties concentrating. Pt noted that his short term disability was denied. Pt is concerned about going back to work d/t his concentration challenges. This would likely present a safety concern at his job. I encouraged Pt to reach out to the agency to discuss what additional documentation he needs. Focused intervention on problem focused coping and discussion of sleep.        O:  MSE:    Appearance: good hygiene   Attitude: cooperative and friendly  Consciousness: drowsy  Orientation: oriented to person, place, time, general circumstance  Memory: recent and remote memory intact  Attention/Concentration: lost train of thought while speaking  Psychomotor Activity:normal  Eye Contact: normal  Speech: normal rate and volume, well-articulated  Mood: depressed  Affect: congruent  Perception: within normal limits  Thought Content: within normal limits  Thought Process: logical, coherent  Insight: good  Judgment: intact  Ability to understand instructions: Yes  Morbid Ideation: no   Suicide Assessment: no suicidal ideation, plan, or intent  Homicidal Ideation: no     History:    Social History:   Social History     Socioeconomic History    Marital status: Single     Spouse name: Not on file    Number of children: 2    Years of education: Not on file    Highest education level: Not on file   Occupational History    Occupation: [employer]    Tobacco Use    Smoking status: Former     Packs/day: 0.50     Years: 25.00     Pack years: 12.50     Types: Cigarettes     Start date: 1989     Quit date: 2015     Years since quittin.2    Smokeless tobacco: Never    Tobacco comments:     started to smoke at 14 / smoked up to 1 to 1.5 p.p.d / quit smoking 2015   Vaping Use    Vaping Use: Former    Substances: Always   Substance and Sexual Activity    Alcohol use: Yes     Alcohol/week: 2.0 standard drinks     Types: 2 Cans of beer per week     Comment: socially    Drug use: No    Sexual activity: Yes     Partners: Female     Comment: 1 child   Other Topics Concern    Not on file   Social History Narrative    Lives with mother-December 10, 2020      Social Determinants of Health     Financial Resource Strain: Low Risk     Difficulty of Paying Living Expenses: Not hard at all   Food Insecurity: No Food Insecurity    Worried About Running Out of Food in the Last Year: Never true    Ran Out of Food in the Last Year: Never true   Transportation Needs: Not on file   Physical Activity: Not on file   Stress: Not on file   Social Connections: Not on file   Intimate Partner Violence: Not on file   Housing Stability: Not on file     TOBACCO:   reports that he quit smoking about 7 years ago. His smoking use included cigarettes. He started smoking about 33 years ago. He has a 12.50 pack-year smoking history. He has never used smokeless tobacco.  ETOH:   reports current alcohol use of about 2.0 standard drinks per week. A:  Patient engaged and cooperative. denies SI. Insight and motivation are good. Diagnosis:    1. Moderate episode of recurrent major depressive disorder ([facility]            Plan:    There are no Patient Instructions on file for this visit. Pt interventions:    See above  No follow-ups on file.    Documentation was done using voice recognition dragon software. Every effort was made to ensure accuracy; however, inadvertent, unintentional computerized transcription errors may be present.

## 2022-12-15 ENCOUNTER — HOSPITAL ENCOUNTER (OUTPATIENT)
Dept: SLEEP CENTER | Age: 47
Discharge: HOME | End: 2022-12-15

## 2022-12-15 ENCOUNTER — OFFICE VISIT (OUTPATIENT)
Dept: PSYCHOLOGY | Age: 47
End: 2022-12-15

## 2022-12-15 DIAGNOSIS — F33.1 MODERATE EPISODE OF RECURRENT MAJOR DEPRESSIVE DISORDER (HCC): Primary | Chronic | ICD-10-CM

## 2022-12-15 DIAGNOSIS — G47.33 OBSTRUCTIVE SLEEP APNEA SYNDROME: Chronic | ICD-10-CM

## 2022-12-15 NOTE — PROGRESS NOTES
Behavioral Health Consultation  Arvind Teresa, Ph.D.  Psychologist  12/15/2022   4:26 PM EST       Time spent with Patient: 30 minutes      Reason for Consult:    Chief Complaint   Patient presents with    Depression         Pt provided informed consent for the behavioral health program. Discussed with patient model of service to include the limits of confidentiality (i.e. abuse reporting, suicide  intervention, etc.) and short-term intervention focused approach. Pt indicated understanding. Feedback given to PCP. S:  Pt seen per PCP re: anxiety    Pt seen for f/u. Patient noted continued challenges with his depression. Patient reported that he feels deactivated and struggling with anhedonia. Patient stated that he is unsure of the direction that he wants to take in life. Patient has good financial standing at his current job but does not feel as fulfilled.   Focused intervention on valued living, cognitive restructuring, advised for behavioral engagement      O:  MSE:    Appearance: good hygiene   Attitude: cooperative and friendly  Consciousness: drowsy  Orientation: oriented to person, place, time, general circumstance  Memory: recent and remote memory intact  Attention/Concentration: lost train of thought while speaking  Psychomotor Activity:normal  Eye Contact: normal  Speech: normal rate and volume, well-articulated  Mood: depressed  Affect: congruent  Perception: within normal limits  Thought Content: within normal limits  Thought Process: logical, coherent  Insight: good  Judgment: intact  Ability to understand instructions: Yes  Morbid Ideation: no   Suicide Assessment: no suicidal ideation, plan, or intent  Homicidal Ideation: no     History:    Social History:   Social History     Socioeconomic History    Marital status: Single     Spouse name: Not on file    Number of children: 2    Years of education: Not on file    Highest education level: Not on file   Occupational History    Occupation: [address] [employer]-     Tobacco Use    Smoking status: Former     Packs/day: 0.50     Years: 25.00     Pack years: 12.50     Types: Cigarettes     Start date: 1989     Quit date: 2015     Years since quittin.2    Smokeless tobacco: Never    Tobacco comments:     started to smoke at 14 / smoked up to 1 to 1.5 p.p.d / quit smoking 2015   Vaping Use    Vaping Use: Former    Substances: Always   Substance and Sexual Activity    Alcohol use: Yes     Alcohol/week: 2.0 standard drinks     Types: 2 Cans of beer per week     Comment: socially    Drug use: No    Sexual activity: Yes     Partners: Female     Comment: 1 child   Other Topics Concern    Not on file   Social History Narrative    Lives with mother-December 10, 2020      Social Determinants of Health     Financial Resource Strain: Low Risk     Difficulty of Paying Living Expenses: Not hard at all   Food Insecurity: No Food Insecurity    Worried About Running Out of Food in the Last Year: Never true    Ran Out of Food in the Last Year: Never true   Transportation Needs: Not on file   Physical Activity: Not on file   Stress: Not on file   Social Connections: Not on file   Intimate Partner Violence: Not on file   Housing Stability: Not on file     TOBACCO:   reports that he quit smoking about 7 years ago. His smoking use included cigarettes. He started smoking about 33 years ago. He has a 12.50 pack-year smoking history. He has never used smokeless tobacco.  ETOH:   reports current alcohol use of about 2.0 standard drinks per week. A:  Patient engaged and cooperative. denies SI. Insight and motivation are good. Diagnosis:    1. Moderate episode of recurrent major depressive disorder ([facility]            Plan:    There are no Patient Instructions on file for this visit. Pt interventions:    See above  No follow-ups on file. Documentation was done using voice recognition dragon software.   Every effort was made to ensure accuracy; however, inadvertent, unintentional computerized transcription errors may be present.

## 2022-12-17 DIAGNOSIS — G47.33 OBSTRUCTIVE SLEEP APNEA SYNDROME: Primary | ICD-10-CM

## 2022-12-21 ENCOUNTER — TELEPHONE (OUTPATIENT)
Dept: PULMONOLOGY | Age: 47
End: 2022-12-21

## 2022-12-22 ENCOUNTER — TELEPHONE (OUTPATIENT)
Dept: PULMONOLOGY | Age: 47
End: 2022-12-22

## 2022-12-22 NOTE — TELEPHONE ENCOUNTER
Reviewed results of PSG with pt. Pt is not sure how he wants to treat the VU. Pt to consult with Dr. Jarred Joshua about the [facility]. Pt asking about disability forms and was given the number to call. Explained to pt that sleep apnea is not a disability if treated.  Pt to call and inform us of treatment decision

## 2022-12-30 ENCOUNTER — OFFICE VISIT (OUTPATIENT)
Dept: INTERNAL MEDICINE CLINIC | Age: 47
End: 2022-12-30
Payer: COMMERCIAL

## 2022-12-30 VITALS
OXYGEN SATURATION: 98 % | DIASTOLIC BLOOD PRESSURE: 62 MMHG | HEART RATE: 69 BPM | TEMPERATURE: 98.3 F | BODY MASS INDEX: 28.31 KG/M2 | SYSTOLIC BLOOD PRESSURE: 110 MMHG | WEIGHT: 245 LBS

## 2022-12-30 DIAGNOSIS — G47.33 OBSTRUCTIVE SLEEP APNEA SYNDROME: Primary | ICD-10-CM

## 2022-12-30 DIAGNOSIS — F33.1 MODERATE EPISODE OF RECURRENT MAJOR DEPRESSIVE DISORDER (HCC): ICD-10-CM

## 2022-12-30 PROCEDURE — G8484 FLU IMMUNIZE NO ADMIN: HCPCS | Performed by: INTERNAL MEDICINE

## 2022-12-30 PROCEDURE — G8427 DOCREV CUR MEDS BY ELIG CLIN: HCPCS | Performed by: INTERNAL MEDICINE

## 2022-12-30 PROCEDURE — 99214 OFFICE O/P EST MOD 30 MIN: CPT | Performed by: INTERNAL MEDICINE

## 2022-12-30 PROCEDURE — G8417 CALC BMI ABV UP PARAM F/U: HCPCS | Performed by: INTERNAL MEDICINE

## 2022-12-30 PROCEDURE — 1036F TOBACCO NON-USER: CPT | Performed by: INTERNAL MEDICINE

## 2022-12-30 ASSESSMENT — PATIENT HEALTH QUESTIONNAIRE - PHQ9
4. FEELING TIRED OR HAVING LITTLE ENERGY: 3
SUM OF ALL RESPONSES TO PHQ9 QUESTIONS 1 & 2: 6
SUM OF ALL RESPONSES TO PHQ QUESTIONS 1-9: 22
8. MOVING OR SPEAKING SO SLOWLY THAT OTHER PEOPLE COULD HAVE NOTICED. OR THE OPPOSITE, BEING SO FIGETY OR RESTLESS THAT YOU HAVE BEEN MOVING AROUND A LOT MORE THAN USUAL: 2
6. FEELING BAD ABOUT YOURSELF - OR THAT YOU ARE A FAILURE OR HAVE LET YOURSELF OR YOUR FAMILY DOWN: 2
1. LITTLE INTEREST OR PLEASURE IN DOING THINGS: 3
10. IF YOU CHECKED OFF ANY PROBLEMS, HOW DIFFICULT HAVE THESE PROBLEMS MADE IT FOR YOU TO DO YOUR WORK, TAKE CARE OF THINGS AT HOME, OR GET ALONG WITH OTHER PEOPLE: 3
9. THOUGHTS THAT YOU WOULD BE BETTER OFF DEAD, OR OF HURTING YOURSELF: 1
SUM OF ALL RESPONSES TO PHQ QUESTIONS 1-9: 22
SUM OF ALL RESPONSES TO PHQ QUESTIONS 1-9: 21
SUM OF ALL RESPONSES TO PHQ QUESTIONS 1-9: 22
5. POOR APPETITE OR OVEREATING: 3
3. TROUBLE FALLING OR STAYING ASLEEP: 3
7. TROUBLE CONCENTRATING ON THINGS, SUCH AS READING THE NEWSPAPER OR WATCHING TELEVISION: 2
2. FEELING DOWN, DEPRESSED OR HOPELESS: 3

## 2022-12-30 ASSESSMENT — ANXIETY QUESTIONNAIRES
6. BECOMING EASILY ANNOYED OR IRRITABLE: 2
3. WORRYING TOO MUCH ABOUT DIFFERENT THINGS: 2
GAD7 TOTAL SCORE: 15
2. NOT BEING ABLE TO STOP OR CONTROL WORRYING: 2
1. FEELING NERVOUS, ANXIOUS, OR ON EDGE: 1
7. FEELING AFRAID AS IF SOMETHING AWFUL MIGHT HAPPEN: 2
5. BEING SO RESTLESS THAT IT IS HARD TO SIT STILL: 3
4. TROUBLE RELAXING: 3
IF YOU CHECKED OFF ANY PROBLEMS ON THIS QUESTIONNAIRE, HOW DIFFICULT HAVE THESE PROBLEMS MADE IT FOR YOU TO DO YOUR WORK, TAKE CARE OF THINGS AT HOME, OR GET ALONG WITH OTHER PEOPLE: EXTREMELY DIFFICULT

## 2022-12-30 NOTE — PROGRESS NOTES
Tomy Perez (:  1975) is a 49 y.o. male,Established patient, here for evaluation of the following chief complaint(s):  Follow-up (mood)         ASSESSMENT/PLAN:  1. Obstructive sleep apnea syndrome  Worsening  -  go for cpap fitting    2. Moderate episode of recurrent major depressive disorder (HCC)  Worsening  -  follow-up with Dr. Domi Moulton  -  continue adderall    Return in about 2 months (around 2023) for mood. Subjective   SUBJECTIVE/OBJECTIVE:  HPI  Mood Disorder:  Patient presents for follow-up of depression. Current complaints include: anhedonia, depressed mood, tearfulness, insomnia, hypersomnia, fatigue, decreased libido, and irritability. He denies any other symptoms. Symptoms/signs of laron: none. External stressors: financial concern and employment concern. Current treatment includes: Celexa- 20 mg.  Medication side effects: none. Sleep Apnea:  Current treatment: none. Compliance: noncompliant: has not picked up the cpap . Residual symptoms include: morning fatigue, snoring, and morning headaches. Review of Systems       Objective   Physical Exam     PHQ-9  2022 2022 11/15/2022 2022 2022 2022 2022   Little interest or pleasure in doing things 3 - 2 2 2 1 2   Feeling down, depressed, or hopeless 3 3 1 2 2 1 1   Trouble falling or staying asleep, or sleeping too much 3 2 3 2 2 1 3   Feeling tired or having little energy 3 3 3 2 2 1 2   Poor appetite or overeating 3 2 1 3 2 1 2   Feeling bad about yourself - or that you are a failure or have let yourself or your family down 2 1 1 2 2 0 0   Trouble concentrating on things, such as reading the newspaper or watching television 2 3 2 3 2 0 0   Moving or speaking so slowly that other people could have noticed.  Or the opposite - being so fidgety or restless that you have been moving around a lot more than usual 2 0 0 0 2 1 2   Thoughts that you would be better off dead, or of hurting yourself in some way 1 1 0 1 2 0 1   PHQ-2 Score 6 - 3 4 4 2 3   PHQ-9 Total Score 22 15 13 17 18 6 13   If you checked off any problems, how difficult have these problems made it for you to do your work, take care of things at home, or get along with other people? 3 3 3 3 2 1 1     Interpretation of Total Score Total Score Depression Severity: 1-4 = Minimal depression, 5-9 = Mild depression, 10-14 = Moderate depression, 15-19 = Moderately severe depression, 20-27 = Severe depression           An electronic signature was used to authenticate this note.     --Ronald Sharma MD

## 2022-12-30 NOTE — LETTER
[facility]  [address] [id number]  Phone: [phone]  Fax: [phone]    Marlys Black MD        December 30, 2022     Patient: Belen Leslie   YOB: 1975   Date of Visit: 12/30/2022       To Whom It May Concern:    Tomy Perez is under my care for his mood and chronic daytime somnolence. He was recently diagnosed with severe obstructive sleep apnea and will be starting cpap therapy. After 6 - 8 weeks of cpap therapy, his symptoms should improve and he should be able to return to his regular duties. It is my medical opinion that Talita Limon may return to work on January 9, 2023 on light duty. He should be able to return to full duty following 6-8 weeks of cpap therapy. If you have any questions or concerns, please don't hesitate to call.     Sincerely,        Jocelyn Smallwood MD, MSc, [facility], [address] Internal [facility]-Pediatrics  [address] [address].

## 2023-01-05 ENCOUNTER — OFFICE VISIT (OUTPATIENT)
Dept: PSYCHOLOGY | Age: 48
End: 2023-01-05
Payer: COMMERCIAL

## 2023-01-05 ENCOUNTER — TELEPHONE (OUTPATIENT)
Dept: PULMONOLOGY | Age: 48
End: 2023-01-05

## 2023-01-05 DIAGNOSIS — F32.1 MODERATE MAJOR DEPRESSION (HCC): Primary | ICD-10-CM

## 2023-01-05 PROCEDURE — 90832 PSYTX W PT 30 MINUTES: CPT | Performed by: PSYCHOLOGIST

## 2023-01-05 PROCEDURE — 1036F TOBACCO NON-USER: CPT | Performed by: PSYCHOLOGIST

## 2023-01-05 RX ORDER — PREDNISONE 50 MG/1
50 TABLET ORAL DAILY
Qty: 5 TABLET | Refills: 0 | Status: SHIPPED | OUTPATIENT
Start: 2023-01-05 | End: 2023-01-10

## 2023-01-05 NOTE — PROGRESS NOTES
Behavioral Health Consultation  Cari Cowan, Ph.D.  Psychologist  1/5/2023   4:26 PM EST       Time spent with Patient: 30 minutes      Reason for Consult:    Chief Complaint   Patient presents with    Depression           Pt provided informed consent for the behavioral health program. Discussed with patient model of service to include the limits of confidentiality (i.e. abuse reporting, suicide  intervention, etc.) and short-term intervention focused approach. Pt indicated understanding. Feedback given to PCP. S:  Pt seen per PCP re: anxiety    Pt seen for f/u. Pt saw his sleep medicine doctor and will be trying the CPAP. Pt will be starting work on Monday. Pt noted that the has been feeling unwell. Pt stated that his holiday was enjoyable. Pt continues to struggle with fatigue. Pt took Nyquil midday today to try to get sleep. Pt is looking forward to spending time with his son today.  Focused visit on discussion of sleep and MI for bx engagement    O:  MSE:    Appearance: good hygiene   Attitude: cooperative and friendly  Consciousness: drowsy  Orientation: oriented to person, place, time, general circumstance  Memory: recent and remote memory intact  Attention/Concentration: lost train of thought while speaking  Psychomotor Activity:normal  Eye Contact: normal  Speech: normal rate and volume, well-articulated  Mood: depressed  Affect: congruent  Perception: within normal limits  Thought Content: within normal limits  Thought Process: logical, coherent  Insight: good  Judgment: intact  Ability to understand instructions: Yes  Morbid Ideation: no   Suicide Assessment: no suicidal ideation, plan, or intent  Homicidal Ideation: no     History:    Social History:   Social History     Socioeconomic History    Marital status: Single     Spouse name: Not on file    Number of children: 2    Years of education: Not on file    Highest education level: Not on file   Occupational History    Occupation: [employer]    Smoking status: Former     Packs/day: 0.50     Years: 25.00     Pack years: 12.50     Types: Cigarettes     Start date: 1989     Quit date: 2015     Years since quittin.3    Smokeless tobacco: Never    Tobacco comments:     started to smoke at 14 / smoked up to 1 to 1.5 p.p.d / quit smoking 2015   Vaping Use    Vaping Use: Former    Substances: Always   Substance and Sexual Activity    Alcohol use: Yes     Alcohol/week: 2.0 standard drinks     Types: 2 Cans of beer per week     Comment: socially    Drug use: No    Sexual activity: Yes     Partners: Female     Comment: 1 child   Other Topics Concern    Not on file   Social History Narrative    Lives with mother-December 10, 2020      Social Determinants of Health     Financial Resource Strain: Low Risk     Difficulty of Paying Living Expenses: Not hard at all   Food Insecurity: No Food Insecurity    Worried About Running Out of Food in the Last Year: Never true    Ran Out of Food in the Last Year: Never true   Transportation Needs: Not on file   Physical Activity: Not on file   Stress: Not on file   Social Connections: Not on file   Intimate Partner Violence: Not on file   Housing Stability: Not on file     TOBACCO:   reports that he quit smoking about 7 years ago. His smoking use included cigarettes. He started smoking about 34 years ago. He has a 12.50 pack-year smoking history. He has never used smokeless tobacco.  ETOH:   reports current alcohol use of about 2.0 standard drinks per week. A:  Patient engaged and cooperative. denies SI. Insight and motivation are good. Diagnosis:    1. Moderate major depression ([facility]              Plan:    There are no Patient Instructions on file for this visit. Pt interventions:    See above  No follow-ups on file. Documentation was done using voice recognition dragon software.   Every effort was made to ensure accuracy; however, inadvertent, unintentional computerized transcription errors may be present.

## 2023-01-05 NOTE — TELEPHONE ENCOUNTER
Pt left two messages stating he was returning our call and to r/s his appt he missed today. I tried calling him but his phone went straight to voicemail.     Ph. [phone]

## 2023-01-09 ENCOUNTER — TELEPHONE (OUTPATIENT)
Dept: INTERNAL MEDICINE CLINIC | Age: 48
End: 2023-01-09

## 2023-01-09 NOTE — TELEPHONE ENCOUNTER
I spoke to patient and he stated that his credit cards are maxed out due to his disability being stopped. He has been waiting 3 weeks for this paperwork. I do not see anything scanned in. Last MRO request was in October 2022. Please advise.  He is asking for notes faxed over to [employer] [facility].

## 2023-01-09 NOTE — TELEPHONE ENCOUNTER
Patient called in wanting to know if paperwork was filled out and sent to [address] [place] [address] that its been over 3 weeks and haven't heard anything.  Patient can be reached at [phone] (home)

## 2023-01-11 NOTE — TELEPHONE ENCOUNTER
I have not received any paperwork and this is the first time I'm hearing about any pending paperwork. Can you try reaching out to this patient again to make sure he brings any necessary paperwork in for me and Dr. Dinorah Leong.

## 2023-01-17 ENCOUNTER — TELEPHONE (OUTPATIENT)
Dept: FAMILY MEDICINE CLINIC | Age: 48
End: 2023-01-17

## 2023-01-18 NOTE — TELEPHONE ENCOUNTER
I spoke to patient and informed him that the [employer] forms were faxed to [facility].

## 2023-01-18 NOTE — PROGRESS NOTES
Tomy ELISE Chris         : 1975  [facility]    Diagnosis: [x] VU (G47.33) [] CSA (G47.31) [] Apnea (G47.30)   Length of Need: [x] 18 Months [] 99 Months [] Other:    Machine (GINA!): [] Respironics Dream Station   2   Auto [] ResMed AirSense     Auto S11 [] Other:     []  CPAP () [] Bilevel ()   Mode: [] Auto [] Spontaneous    Mode: [] Auto [] Spontaneous            Comfort Settings:   - Ramp Pressure: 5 cmH2O                                        - Ramp time: 15 min                                     -  Flex/EPR - 3 full time                                    - For ResMed Bilevel (TiMax-4 sec   TiMin- 0.2 sec)     Humidifier: [] Heated ()        [] Water chamber replacement ()/ 1 per 6 months        Mask:   [x] Nasal () /1 per 3 months [] Full Face () /1 per 3 months   [x] Patient choice -Size and fit mask [] Patient Choice - Size and fit mask   [] Dispense:  [] Dispense:    [x] Headgear () / 1 per 3 months [] Headgear () / 1 per 3 months   [x] Replacement Nasal Cushion ()/2 per month [] Interface Replacement ()/1 per month   [] Replacement Nasal Pillows ()/2 per month         Tubing: [] Heated ()/1 per 3 months    [] Standard ()/1 per 3 months [] Other:           Filters: [] Non-disposable ()/1 per 6 months     [] Ultra-Fine, Disposable ()/2 per month        Miscellaneous: [] Chin Strap ()/ 1 per 6 months [] O2 bleed-in:       LPM   [] Oximetry on CPAP/Bilevel []  Other:    [] Modem: ()         Start Order Date: 23    MEDICAL JUSTIFICATION:  I, the undersigned, certify that the above prescribed supplies are medically necessary for this patients wellbeing. In my opinion, the supplies are both reasonable and necessary in reference to accepted standards of medicalpractice in treatment of this patients condition.     Danielle Dinh MD      NPI: [id number]       Order Signed Date: 23    Electronically signed by Sally Griffin MD on 2023 at 10:17 AM    Tomy Perez  1975  [address] [facility]  [phone] (home)   [phone] (mobile)      Insurance Info (confirm with patient if correct):  Payer/Plan Subscr  Sex Relation Sub.  Ins. ID Effective Group Num

## 2023-02-01 ENCOUNTER — TELEPHONE (OUTPATIENT)
Dept: INTERNAL MEDICINE CLINIC | Age: 48
End: 2023-02-01

## 2023-02-01 NOTE — TELEPHONE ENCOUNTER
Please Advise     Patient states medical records were never faxed over to [facility] , [facility] also called as well stating the same thing. Medical records need to be faxed over to [phone]. Patient can be reached at [phone] (home) .  Surinder Cardona from [facility] can be reached at [phone]

## 2023-02-01 NOTE — TELEPHONE ENCOUNTER
[employer], the third party record release company that we use, has 30 days from the date the release was received which was on 1/12/2023. I called Madyson Carroll and informed her and the patient of that information.

## 2023-02-20 ENCOUNTER — TELEPHONE (OUTPATIENT)
Dept: INTERNAL MEDICINE CLINIC | Age: 48
End: 2023-02-20

## 2023-02-20 RX ORDER — PREDNISONE 50 MG/1
50 TABLET ORAL DAILY
Qty: 5 TABLET | Refills: 0 | Status: SHIPPED | OUTPATIENT
Start: 2023-02-20 | End: 2023-02-25

## 2023-02-20 NOTE — TELEPHONE ENCOUNTER
Patient informed however the reason he is asking for one is because his nose is congested and nothing is helping.

## 2023-02-20 NOTE — TELEPHONE ENCOUNTER
Please Advise     Patient is requesting a referral for allergy specialist.    Patient can be reached at [phone] (opjf)

## 2023-02-20 NOTE — TELEPHONE ENCOUNTER
Lets try some prednisone. I will send a prescription to the pharmacy. That should help with some of the congestion.

## 2023-03-06 ENCOUNTER — TELEPHONE (OUTPATIENT)
Dept: INTERNAL MEDICINE CLINIC | Age: 48
End: 2023-03-06

## 2023-03-06 NOTE — TELEPHONE ENCOUNTER
----- Message from April Nagle sent at 3/6/2023  9:48 AM EST -----  Subject: Referral Request    Reason for referral request? Patient would like a referral for a dentist,   whoever dr. New Pagan recommends. patient is having dental issues he would   like a referral for this. Provider patient wants to be referred to(if known):     Provider Phone Number(if known): Additional Information for Provider?  please call patient back to advise,   thank you   ---------------------------------------------------------------------------  --------------  Warden Hernandes INFO    [phone]; OK to leave message on voicemail  ---------------------------------------------------------------------------  --------------

## 2023-03-10 ENCOUNTER — TELEPHONE (OUTPATIENT)
Dept: INTERNAL MEDICINE CLINIC | Age: 48
End: 2023-03-10

## 2023-03-10 NOTE — TELEPHONE ENCOUNTER
Please Advise     Isai Presley would like a call back from  regarding peer to peer disability ,  can be reached at [phone].

## 2023-03-16 ENCOUNTER — OFFICE VISIT (OUTPATIENT)
Dept: INTERNAL MEDICINE CLINIC | Age: 48
End: 2023-03-16
Payer: COMMERCIAL

## 2023-03-16 VITALS
BODY MASS INDEX: 28.77 KG/M2 | OXYGEN SATURATION: 96 % | DIASTOLIC BLOOD PRESSURE: 60 MMHG | HEART RATE: 79 BPM | SYSTOLIC BLOOD PRESSURE: 118 MMHG | WEIGHT: 249 LBS

## 2023-03-16 DIAGNOSIS — J45.40 MODERATE PERSISTENT ASTHMA WITHOUT COMPLICATION: ICD-10-CM

## 2023-03-16 DIAGNOSIS — H65.112 ACUTE MUCOID OTITIS MEDIA OF LEFT EAR: Primary | ICD-10-CM

## 2023-03-16 PROCEDURE — G8417 CALC BMI ABV UP PARAM F/U: HCPCS | Performed by: INTERNAL MEDICINE

## 2023-03-16 PROCEDURE — 1036F TOBACCO NON-USER: CPT | Performed by: INTERNAL MEDICINE

## 2023-03-16 PROCEDURE — G8427 DOCREV CUR MEDS BY ELIG CLIN: HCPCS | Performed by: INTERNAL MEDICINE

## 2023-03-16 PROCEDURE — G8484 FLU IMMUNIZE NO ADMIN: HCPCS | Performed by: INTERNAL MEDICINE

## 2023-03-16 PROCEDURE — 99213 OFFICE O/P EST LOW 20 MIN: CPT | Performed by: INTERNAL MEDICINE

## 2023-03-16 RX ORDER — CEFDINIR 300 MG/1
300 CAPSULE ORAL 2 TIMES DAILY
Qty: 14 CAPSULE | Refills: 0 | Status: SHIPPED | OUTPATIENT
Start: 2023-03-16 | End: 2023-03-23

## 2023-03-16 SDOH — ECONOMIC STABILITY: INCOME INSECURITY: HOW HARD IS IT FOR YOU TO PAY FOR THE VERY BASICS LIKE FOOD, HOUSING, MEDICAL CARE, AND HEATING?: HARD

## 2023-03-16 SDOH — ECONOMIC STABILITY: FOOD INSECURITY: WITHIN THE PAST 12 MONTHS, YOU WORRIED THAT YOUR FOOD WOULD RUN OUT BEFORE YOU GOT MONEY TO BUY MORE.: OFTEN TRUE

## 2023-03-16 SDOH — ECONOMIC STABILITY: FOOD INSECURITY: WITHIN THE PAST 12 MONTHS, THE FOOD YOU BOUGHT JUST DIDN'T LAST AND YOU DIDN'T HAVE MONEY TO GET MORE.: OFTEN TRUE

## 2023-03-16 SDOH — ECONOMIC STABILITY: HOUSING INSECURITY
IN THE LAST 12 MONTHS, WAS THERE A TIME WHEN YOU DID NOT HAVE A STEADY PLACE TO SLEEP OR SLEPT IN A SHELTER (INCLUDING NOW)?: NO

## 2023-03-16 ASSESSMENT — ANXIETY QUESTIONNAIRES
3. WORRYING TOO MUCH ABOUT DIFFERENT THINGS: 2
1. FEELING NERVOUS, ANXIOUS, OR ON EDGE: 2
5. BEING SO RESTLESS THAT IT IS HARD TO SIT STILL: 2
4. TROUBLE RELAXING: 2
6. BECOMING EASILY ANNOYED OR IRRITABLE: 3
2. NOT BEING ABLE TO STOP OR CONTROL WORRYING: 2
7. FEELING AFRAID AS IF SOMETHING AWFUL MIGHT HAPPEN: 1
GAD7 TOTAL SCORE: 14
IF YOU CHECKED OFF ANY PROBLEMS ON THIS QUESTIONNAIRE, HOW DIFFICULT HAVE THESE PROBLEMS MADE IT FOR YOU TO DO YOUR WORK, TAKE CARE OF THINGS AT HOME, OR GET ALONG WITH OTHER PEOPLE: VERY DIFFICULT

## 2023-03-16 ASSESSMENT — PATIENT HEALTH QUESTIONNAIRE - PHQ9
SUM OF ALL RESPONSES TO PHQ QUESTIONS 1-9: 13
1. LITTLE INTEREST OR PLEASURE IN DOING THINGS: 2
10. IF YOU CHECKED OFF ANY PROBLEMS, HOW DIFFICULT HAVE THESE PROBLEMS MADE IT FOR YOU TO DO YOUR WORK, TAKE CARE OF THINGS AT HOME, OR GET ALONG WITH OTHER PEOPLE: 2
2. FEELING DOWN, DEPRESSED OR HOPELESS: 1
SUM OF ALL RESPONSES TO PHQ QUESTIONS 1-9: 14
9. THOUGHTS THAT YOU WOULD BE BETTER OFF DEAD, OR OF HURTING YOURSELF: 1
3. TROUBLE FALLING OR STAYING ASLEEP: 1
SUM OF ALL RESPONSES TO PHQ9 QUESTIONS 1 & 2: 3
4. FEELING TIRED OR HAVING LITTLE ENERGY: 2
SUM OF ALL RESPONSES TO PHQ QUESTIONS 1-9: 14
SUM OF ALL RESPONSES TO PHQ QUESTIONS 1-9: 14
5. POOR APPETITE OR OVEREATING: 1
7. TROUBLE CONCENTRATING ON THINGS, SUCH AS READING THE NEWSPAPER OR WATCHING TELEVISION: 2
8. MOVING OR SPEAKING SO SLOWLY THAT OTHER PEOPLE COULD HAVE NOTICED. OR THE OPPOSITE, BEING SO FIGETY OR RESTLESS THAT YOU HAVE BEEN MOVING AROUND A LOT MORE THAN USUAL: 2
6. FEELING BAD ABOUT YOURSELF - OR THAT YOU ARE A FAILURE OR HAVE LET YOURSELF OR YOUR FAMILY DOWN: 2

## 2023-03-16 NOTE — PROGRESS NOTES
- - - - - - 12     Interpretation of BIJAL-7 score: 5-9 = mild anxiety, 10-14 = moderate anxiety, 15+ = severe anxiety. Recommend referral to behavioral health for scores 10 or greater. An electronic signature was used to authenticate this note.     --Jane Leblanc MD

## 2023-03-20 ASSESSMENT — ENCOUNTER SYMPTOMS
RHINORRHEA: 1
COUGH: 1

## 2023-04-26 ENCOUNTER — OFFICE VISIT (OUTPATIENT)
Dept: ORTHOPEDIC SURGERY | Age: 48
End: 2023-04-26
Payer: COMMERCIAL

## 2023-04-26 VITALS — WEIGHT: 250 LBS | HEIGHT: 78 IN | BODY MASS INDEX: 28.93 KG/M2

## 2023-04-26 DIAGNOSIS — M79.644 PAIN OF RIGHT MIDDLE FINGER: Primary | ICD-10-CM

## 2023-04-26 PROCEDURE — 99213 OFFICE O/P EST LOW 20 MIN: CPT | Performed by: PHYSICIAN ASSISTANT

## 2023-04-26 PROCEDURE — G8417 CALC BMI ABV UP PARAM F/U: HCPCS | Performed by: PHYSICIAN ASSISTANT

## 2023-04-26 PROCEDURE — 1036F TOBACCO NON-USER: CPT | Performed by: PHYSICIAN ASSISTANT

## 2023-04-26 PROCEDURE — G8427 DOCREV CUR MEDS BY ELIG CLIN: HCPCS | Performed by: PHYSICIAN ASSISTANT

## 2023-04-27 NOTE — PROGRESS NOTES
This dictation was done with "[employer]on dictation and may contain mechanical errors related to translation. I have today reviewed with Tomy Perez the clinically relevant, past medical history, medications, allergies, family history, social history, and Review Of Systems form the patients most recent history form & I have documented any details relevant to today's presenting complaints in my history below. Mr. David Carvajal self-reported past medical history, medications, allergies, family history, social history, and Review Of Systems form has been scanned into the chart under the \"Media\" tab. Subjective:  Tomy Perez is a 49 y.o. who is here as an established patient at [place] [address] complaining of a inability to extend his middle right finger after jamming it cleaning his car. It appears to be a mallet finger. He was sent for x-rays including AP lateral and oblique. He currently has minimal pain with that. Its been almost 2 weeks      Patient Active Problem List   Diagnosis    Chronic tension headaches    Gastritis    Insomnia    Bilateral pulmonary embolism (HCC)    Chest pain varying with breathing    Pulmonary infarct (HCC)    Moderate episode of recurrent major depressive disorder (HCC)    BIJAL (generalized anxiety disorder)    Internal hemorrhoid    Obstructive sleep apnea syndrome    RLS (restless legs syndrome)    Left inguinal pain    Umbilical hernia without obstruction and without gangrene    Lumbar radiculopathy           Current Outpatient Medications on File Prior to Visit   Medication Sig Dispense Refill    citalopram (CELEXA) 20 MG tablet Take 2 tablets by mouth nightly 60 tablet 2    amphetamine-dextroamphetamine (ADDERALL XR) 20 MG extended release capsule Take 1 capsule by mouth every morning for 30 days.  Do not fill before 11/28/2022 30 capsule 0    valACYclovir (VALTREX) 500 MG tablet Take 1 tablet by mouth daily 30 tablet 5    pramipexole (MIRAPEX) 0.25 MG tablet 3 TABLETS BY

## 2023-05-02 ENCOUNTER — OFFICE VISIT (OUTPATIENT)
Dept: PSYCHIATRY | Age: 48
End: 2023-05-02
Payer: COMMERCIAL

## 2023-05-02 VITALS
SYSTOLIC BLOOD PRESSURE: 126 MMHG | HEIGHT: 78 IN | HEART RATE: 70 BPM | RESPIRATION RATE: 16 BRPM | BODY MASS INDEX: 29.78 KG/M2 | WEIGHT: 257.4 LBS | OXYGEN SATURATION: 96 % | DIASTOLIC BLOOD PRESSURE: 72 MMHG

## 2023-05-02 DIAGNOSIS — F90.2 ATTENTION DEFICIT HYPERACTIVITY DISORDER (ADHD), COMBINED TYPE: ICD-10-CM

## 2023-05-02 DIAGNOSIS — F32.1 MODERATE SINGLE CURRENT EPISODE OF MAJOR DEPRESSIVE DISORDER (HCC): Primary | ICD-10-CM

## 2023-05-02 PROCEDURE — G8417 CALC BMI ABV UP PARAM F/U: HCPCS | Performed by: PSYCHIATRY & NEUROLOGY

## 2023-05-02 PROCEDURE — 99214 OFFICE O/P EST MOD 30 MIN: CPT | Performed by: PSYCHIATRY & NEUROLOGY

## 2023-05-02 PROCEDURE — 1036F TOBACCO NON-USER: CPT | Performed by: PSYCHIATRY & NEUROLOGY

## 2023-05-02 PROCEDURE — G8427 DOCREV CUR MEDS BY ELIG CLIN: HCPCS | Performed by: PSYCHIATRY & NEUROLOGY

## 2023-05-02 RX ORDER — ATOMOXETINE 40 MG/1
40 CAPSULE ORAL DAILY
Qty: 30 CAPSULE | Refills: 0 | Status: SHIPPED | OUTPATIENT
Start: 2023-05-02

## 2023-05-02 RX ORDER — ATOMOXETINE 25 MG/1
25 CAPSULE ORAL DAILY
Qty: 15 CAPSULE | Refills: 0 | Status: SHIPPED | OUTPATIENT
Start: 2023-05-02 | End: 2023-05-17

## 2023-05-02 RX ORDER — CITALOPRAM 20 MG/1
40 TABLET ORAL NIGHTLY
Qty: 60 TABLET | Refills: 2 | Status: SHIPPED | OUTPATIENT
Start: 2023-05-02

## 2023-05-02 ASSESSMENT — PATIENT HEALTH QUESTIONNAIRE - PHQ9
9. THOUGHTS THAT YOU WOULD BE BETTER OFF DEAD, OR OF HURTING YOURSELF: 1
4. FEELING TIRED OR HAVING LITTLE ENERGY: 2
8. MOVING OR SPEAKING SO SLOWLY THAT OTHER PEOPLE COULD HAVE NOTICED. OR THE OPPOSITE, BEING SO FIGETY OR RESTLESS THAT YOU HAVE BEEN MOVING AROUND A LOT MORE THAN USUAL: 1
SUM OF ALL RESPONSES TO PHQ QUESTIONS 1-9: 15
SUM OF ALL RESPONSES TO PHQ9 QUESTIONS 1 & 2: 4
10. IF YOU CHECKED OFF ANY PROBLEMS, HOW DIFFICULT HAVE THESE PROBLEMS MADE IT FOR YOU TO DO YOUR WORK, TAKE CARE OF THINGS AT HOME, OR GET ALONG WITH OTHER PEOPLE: 3
SUM OF ALL RESPONSES TO PHQ QUESTIONS 1-9: 15
1. LITTLE INTEREST OR PLEASURE IN DOING THINGS: 2
2. FEELING DOWN, DEPRESSED OR HOPELESS: 2
7. TROUBLE CONCENTRATING ON THINGS, SUCH AS READING THE NEWSPAPER OR WATCHING TELEVISION: 2
3. TROUBLE FALLING OR STAYING ASLEEP: 1
6. FEELING BAD ABOUT YOURSELF - OR THAT YOU ARE A FAILURE OR HAVE LET YOURSELF OR YOUR FAMILY DOWN: 2
SUM OF ALL RESPONSES TO PHQ QUESTIONS 1-9: 14
SUM OF ALL RESPONSES TO PHQ QUESTIONS 1-9: 15
5. POOR APPETITE OR OVEREATING: 2

## 2023-05-02 ASSESSMENT — ANXIETY QUESTIONNAIRES
1. FEELING NERVOUS, ANXIOUS, OR ON EDGE: 2
4. TROUBLE RELAXING: 2
6. BECOMING EASILY ANNOYED OR IRRITABLE: 2
IF YOU CHECKED OFF ANY PROBLEMS ON THIS QUESTIONNAIRE, HOW DIFFICULT HAVE THESE PROBLEMS MADE IT FOR YOU TO DO YOUR WORK, TAKE CARE OF THINGS AT HOME, OR GET ALONG WITH OTHER PEOPLE: EXTREMELY DIFFICULT
3. WORRYING TOO MUCH ABOUT DIFFERENT THINGS: 2
2. NOT BEING ABLE TO STOP OR CONTROL WORRYING: 2
GAD7 TOTAL SCORE: 14
5. BEING SO RESTLESS THAT IT IS HARD TO SIT STILL: 2
7. FEELING AFRAID AS IF SOMETHING AWFUL MIGHT HAPPEN: 2

## 2023-05-02 NOTE — PROGRESS NOTES
PSYCHIATRY PROGRESS NOTE    Tomy Perez : 22  PCP: Hardeep Skinner MD    Chief Complaint   Patient presents with    Depression       S:   Pt was last seen in 2022. He continues to report depressed mood, anhedonia, difficulty with concentration, being easily distracted, initiating tasks, following through with tasks, prioritizing tasks, procrastination. At times he feels like giving up on his life or his life is not worth living, but denies any plans or intent to harm himself. He presents a somewhat hopeless about his struggles. He continues to take celexa 40mg qday. It didn't seem to help him at the higher dose. There are still sexual side effects and he feels maybe it has lowered his motivation more or dulled him out in a way. He never returned to work at [employer]. 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 He has not been adherent with CPAP, can't tolerate it. Hypoglossal nerve stimulator was mentioned by Dr. Magdaleno Pineda. Drinks about 4-5 drinks per week. ROS: no adverse physical symptoms noted    Current Psychiatric Medications:  Citalopram 20mg BID    O:  Vitals:    23 1033   BP: 126/72   Pulse: 70   Resp: 16   SpO2: 96%   Weight: 257 lb 6.4 oz (116.8 kg)     PHQ Scores 2023 3/16/2023 2022 2022 11/15/2022 2022 2022   PHQ2 Score 4 3 6 - 3 4 4   PHQ9 Score 15 14 22 15 13 17 18     BIJAL 7 SCORE 2023 3/16/2023 2022 2022 11/15/2022 2022 2022   BIJAL-7 Total Score 14 14 15 15 10 18 15   BIJAL-7 Total Score - - - - - - -     Mental Status Exam:   Appearance   casually dressed, appropriately groomed  Motor: No abnormal movements, tics or mannerisms.   Speech    normal rate, rhythm, and vol  Mood/Affect    Depressed / blunted  Thought Content no delusions, no suicidal ideation  Thought Process linear  Associations    logical connections  Attention/Concentration    intact  Memory

## 2023-05-03 ENCOUNTER — OFFICE VISIT (OUTPATIENT)
Dept: PSYCHOLOGY | Age: 48
End: 2023-05-03
Payer: COMMERCIAL

## 2023-05-03 ENCOUNTER — OFFICE VISIT (OUTPATIENT)
Dept: ORTHOPEDIC SURGERY | Age: 48
End: 2023-05-03
Payer: COMMERCIAL

## 2023-05-03 VITALS — HEIGHT: 78 IN | WEIGHT: 257 LBS | BODY MASS INDEX: 29.73 KG/M2

## 2023-05-03 DIAGNOSIS — F33.1 MODERATE EPISODE OF RECURRENT MAJOR DEPRESSIVE DISORDER (HCC): Primary | Chronic | ICD-10-CM

## 2023-05-03 DIAGNOSIS — M20.011 MALLET FINGER, ACQUIRED, RIGHT: Primary | ICD-10-CM

## 2023-05-03 PROCEDURE — G8417 CALC BMI ABV UP PARAM F/U: HCPCS | Performed by: ORTHOPAEDIC SURGERY

## 2023-05-03 PROCEDURE — 90832 PSYTX W PT 30 MINUTES: CPT | Performed by: PSYCHOLOGIST

## 2023-05-03 PROCEDURE — 1036F TOBACCO NON-USER: CPT | Performed by: PSYCHOLOGIST

## 2023-05-03 PROCEDURE — 1036F TOBACCO NON-USER: CPT | Performed by: ORTHOPAEDIC SURGERY

## 2023-05-03 PROCEDURE — 99204 OFFICE O/P NEW MOD 45 MIN: CPT | Performed by: ORTHOPAEDIC SURGERY

## 2023-05-03 PROCEDURE — G8427 DOCREV CUR MEDS BY ELIG CLIN: HCPCS | Performed by: ORTHOPAEDIC SURGERY

## 2023-05-03 NOTE — PROGRESS NOTES
This 49 y.o. left hand dominant  is seen in hand surgery consultation for KILO Sidhu with a chief complaint of an injury to the right middle finger. The injury occurred 2 weeks ago when the finger was injured when he jammed the tip of the finger cleaning his car seats. The patient noted mild pain, swelling and some deformity of the fingertip. They were evaluated at  the [facility]) after hours clinic. Xrays were obtained. The digit was splinted. The patient was referred for hand surgery evaluation. No other finger was injured. The symptoms have persisted since the time of injury but not worsened. The pain assessment has been reviewed and is correct as stated. The patient's social history, past medical history, family history, medications, allergies and review of systems, entered 4/26/23, have been reviewed, and dated and are recorded in the chart. On physical examination, the patient is Height: 6' 6\" (198.1 cm) tall and weighs Weight - Scale: 257 lb (116.6 kg). The patient is well nourished, is oriented to time and place, demonstrates appropriate mood and affect as well as normal gait and station. There is a typical mallet finger deformity of the right middle finger. There is mild dorsal swelling and tenderness on the dorsum of the DIP joint, which demonstrates a 30 degree extensor lag, following 2 weeks of continuous splinting. Range of motion of the other fingers is full and painless. Skin is intact, as is distal circulation and sensation. Gross muscle strength is normal bilaterally. Hand and wrist joints are stable. Deep tendon reflexes are present bilaterally. There are no subcutaneous nodules or enlarged epitrochlear lymph nodes. I have personally reviewed and interpreted all previous external imaging studies, laboratory tests(PSA, HbA1c, CMP, urinalysis), diagnostic procedures and medical encounters pertinent to this patient's visit today.     Xrays: Review and independent

## 2023-05-03 NOTE — PROGRESS NOTES
Curiosity: to be curious, open-minded and interested; to explore and discover  16. Encouragement: to encourage and reward behaviour that I value in myself or others  17. [place]: to treat others as equal to myself, and vice-versa  18. Excitement: to seek, create and engage in activities that are exciting, stimulating or  thrilling  19. Fairness: to be fair to myself or others  20. Fitness: to maintain or improve my fitness; to look after my physical and mental  health and wellbeing  21. Flexibility: to adjust and adapt readily to changing circumstances  22. Freedom: to live freely; to choose how I live and behave, or help others do likewise  23. Friendliness: to be friendly, companionable, or agreeable towards others  24. Forgiveness: to be forgiving towards myself or others  25. Fun: to be fun-loving; to seek, create, and engage in fun-filled activities  26. Generosity: to be generous, sharing and giving, to myself or others  27. Gratitude: to be grateful for and appreciative of the positive aspects of myself, others  and life  28. Honesty: to be honest, truthful, and sincere with myself and others  29. Humour: to see and appreciate the humorous side of life  30. Humility: to be humble or modest; to let my achievements speak for themselves  31. Industry: to be industrious, hard-working, dedicated  28. [place]: to be self-supportive, and choose my own way of doing things  33. Intimacy: to open up, reveal, and share myself -- emotionally or physically - in my  close personal relationships  29. Justice: to uphold justice and fairness  35. Kindness: to be kind, compassionate, considerate, nurturing or caring towards myself  or others  39. Love: to act lovingly or affectionately towards myself or others  40. Mindfulness: to be conscious of, open to, and curious about my here-and-now  experience  45.  Order: to be orderly and organized  44. Open-mindedness: to think things through, see things from others points of

## 2023-05-03 NOTE — PATIENT INSTRUCTIONS
1) Come up with a daily schedule    2) Look over the list of values. Choose the 5 most important to you. A Quick Look at Your Values   Values are your hearts deepest desires for how you want to behave as a human being. Values are not about what you want to get or achieve; they are about how you want to behave or act on an ongoing basis. There are literally hundreds of different values, but below youll find a list of the most common ones. Probably, not all of them will be relevant to you. Keep in mind there are no such things as right values or wrong values. Its a bit like our taste in pizzas. If you prefer ham and pineapple but I prefer salami and olives, that doesnt mean that my taste in pizzas is right and yours is wrong. It just means we have different tastes. And similarly, we may have different values. 1. Acceptance: to be open to and accepting of myself, others, life etc  2. Adventure: to be adventurous; to actively seek, create, or explore novel or stimulating  experiences  3. Assertiveness: to respectfully stand up for my rights and request what I want  4. Authenticity: to be authentic, genuine, real; to be true to myself  5. Beauty: to appreciate, create, nurture or cultivate beauty in myself, others, the  environment etc  6. Caring: to be caring towards myself, others, the environment etc  7. Challenge: to keep challenging myself to grow, learn, improve  8. Compassion: to act with kindness towards those who are suffering  9. Connection: to engage fully in whatever I am doing, and be fully present with others  10. Contribution: to contribute, help, assist, or make a positive difference to myself or  others  11. Conformity: to be respectful and obedient of rules and obligations  12. Cooperation: to be cooperative and collaborative with others  15. Courage: to be courageous or brave; to persist in the face of fear, threat, or difficulty  14. Creativity: to be creative or innovative  15.

## 2023-05-17 ENCOUNTER — TELEPHONE (OUTPATIENT)
Dept: INTERNAL MEDICINE CLINIC | Age: 48
End: 2023-05-17

## 2023-06-04 ENCOUNTER — HOSPITAL ENCOUNTER (EMERGENCY)
Age: 48
Discharge: HOME | End: 2023-06-04
Attending: EMERGENCY MEDICINE
Payer: COMMERCIAL

## 2023-06-04 ENCOUNTER — HOSPITAL ENCOUNTER (EMERGENCY)
Age: 48
Discharge: LEFT BEFORE DISCHARGE | End: 2023-06-04

## 2023-06-04 VITALS
TEMPERATURE: 98.4 F | HEART RATE: 75 BPM | SYSTOLIC BLOOD PRESSURE: 114 MMHG | DIASTOLIC BLOOD PRESSURE: 84 MMHG | RESPIRATION RATE: 14 BRPM | OXYGEN SATURATION: 99 %

## 2023-06-04 DIAGNOSIS — S41.119A LACERATION OF UPPER EXTREMITY, UNSPECIFIED LATERALITY, INITIAL ENCOUNTER: Primary | ICD-10-CM

## 2023-06-04 PROCEDURE — 99283 EMERGENCY DEPT VISIT LOW MDM: CPT

## 2023-06-04 RX ORDER — OXYCODONE HYDROCHLORIDE AND ACETAMINOPHEN 5; 325 MG/1; MG/1
1-2 TABLET ORAL EVERY 6 HOURS PRN
Qty: 7 TABLET | Refills: 0 | Status: SHIPPED | OUTPATIENT
Start: 2023-06-04 | End: 2023-06-11

## 2023-06-04 RX ORDER — BACITRACIN ZINC AND POLYMYXIN B SULFATE 500; 1000 [USP'U]/G; [USP'U]/G
OINTMENT TOPICAL
Qty: 15 G | Refills: 1 | Status: SHIPPED | OUTPATIENT
Start: 2023-06-04 | End: 2023-06-11

## 2023-06-04 RX ORDER — CEPHALEXIN 500 MG/1
500 CAPSULE ORAL 4 TIMES DAILY
Qty: 28 CAPSULE | Refills: 0 | Status: SHIPPED | OUTPATIENT
Start: 2023-06-04 | End: 2023-06-11

## 2023-06-04 RX ORDER — NAPROXEN 500 MG/1
500 TABLET ORAL 2 TIMES DAILY
Qty: 20 TABLET | Refills: 0 | Status: SHIPPED | OUTPATIENT
Start: 2023-06-04 | End: 2023-06-14

## 2023-06-05 NOTE — ED NOTES
Cleaned both arm lacerations with Hibiclens and saline. Then wrapped with non adherent gauze and kurlex. Pt tolerated well.      Carole Benson RCP  06/04/23 2004

## 2023-06-05 NOTE — ED NOTES
Pt dc/d with instructions, rx's and work note in stable condition, ambulatory to lobby. Home per ride.      Alfa Briceño RN  06/04/23 2009

## 2023-06-16 ENCOUNTER — OFFICE VISIT (OUTPATIENT)
Dept: INTERNAL MEDICINE CLINIC | Age: 48
End: 2023-06-16
Payer: COMMERCIAL

## 2023-06-16 VITALS
WEIGHT: 249 LBS | HEART RATE: 71 BPM | OXYGEN SATURATION: 97 % | BODY MASS INDEX: 28.77 KG/M2 | SYSTOLIC BLOOD PRESSURE: 118 MMHG | DIASTOLIC BLOOD PRESSURE: 76 MMHG

## 2023-06-16 DIAGNOSIS — R29.898 WEAKNESS OF LEFT HAND: Primary | ICD-10-CM

## 2023-06-16 DIAGNOSIS — R07.89 OTHER CHEST PAIN: ICD-10-CM

## 2023-06-16 DIAGNOSIS — M62.838 MUSCLE SPASM: ICD-10-CM

## 2023-06-16 PROCEDURE — 99214 OFFICE O/P EST MOD 30 MIN: CPT | Performed by: INTERNAL MEDICINE

## 2023-06-16 PROCEDURE — G8417 CALC BMI ABV UP PARAM F/U: HCPCS | Performed by: INTERNAL MEDICINE

## 2023-06-16 PROCEDURE — G8427 DOCREV CUR MEDS BY ELIG CLIN: HCPCS | Performed by: INTERNAL MEDICINE

## 2023-06-16 PROCEDURE — 1036F TOBACCO NON-USER: CPT | Performed by: INTERNAL MEDICINE

## 2023-06-16 RX ORDER — TIZANIDINE 4 MG/1
4 TABLET ORAL 3 TIMES DAILY PRN
Qty: 30 TABLET | Refills: 0 | Status: SHIPPED | OUTPATIENT
Start: 2023-06-16

## 2023-06-16 RX ORDER — OXYCODONE HYDROCHLORIDE 5 MG/1
5 TABLET ORAL 2 TIMES DAILY PRN
Qty: 14 TABLET | Refills: 0 | Status: SHIPPED | OUTPATIENT
Start: 2023-06-16 | End: 2023-06-23

## 2023-06-21 ENCOUNTER — OFFICE VISIT (OUTPATIENT)
Dept: INTERNAL MEDICINE CLINIC | Age: 48
End: 2023-06-21
Payer: COMMERCIAL

## 2023-06-21 VITALS
DIASTOLIC BLOOD PRESSURE: 60 MMHG | HEART RATE: 65 BPM | BODY MASS INDEX: 29.7 KG/M2 | WEIGHT: 257 LBS | SYSTOLIC BLOOD PRESSURE: 100 MMHG | OXYGEN SATURATION: 97 %

## 2023-06-21 DIAGNOSIS — S46.002A INJURY OF LEFT ROTATOR CUFF, INITIAL ENCOUNTER: Primary | ICD-10-CM

## 2023-06-21 PROCEDURE — G8417 CALC BMI ABV UP PARAM F/U: HCPCS | Performed by: INTERNAL MEDICINE

## 2023-06-21 PROCEDURE — 1036F TOBACCO NON-USER: CPT | Performed by: INTERNAL MEDICINE

## 2023-06-21 PROCEDURE — G8427 DOCREV CUR MEDS BY ELIG CLIN: HCPCS | Performed by: INTERNAL MEDICINE

## 2023-06-21 PROCEDURE — 99214 OFFICE O/P EST MOD 30 MIN: CPT | Performed by: INTERNAL MEDICINE

## 2023-06-21 ASSESSMENT — PATIENT HEALTH QUESTIONNAIRE - PHQ9
10. IF YOU CHECKED OFF ANY PROBLEMS, HOW DIFFICULT HAVE THESE PROBLEMS MADE IT FOR YOU TO DO YOUR WORK, TAKE CARE OF THINGS AT HOME, OR GET ALONG WITH OTHER PEOPLE: 2
4. FEELING TIRED OR HAVING LITTLE ENERGY: 2
SUM OF ALL RESPONSES TO PHQ9 QUESTIONS 1 & 2: 2
SUM OF ALL RESPONSES TO PHQ QUESTIONS 1-9: 13
2. FEELING DOWN, DEPRESSED OR HOPELESS: 1
SUM OF ALL RESPONSES TO PHQ QUESTIONS 1-9: 12
7. TROUBLE CONCENTRATING ON THINGS, SUCH AS READING THE NEWSPAPER OR WATCHING TELEVISION: 2
5. POOR APPETITE OR OVEREATING: 2
3. TROUBLE FALLING OR STAYING ASLEEP: 2
SUM OF ALL RESPONSES TO PHQ QUESTIONS 1-9: 13
SUM OF ALL RESPONSES TO PHQ QUESTIONS 1-9: 13
1. LITTLE INTEREST OR PLEASURE IN DOING THINGS: 1
8. MOVING OR SPEAKING SO SLOWLY THAT OTHER PEOPLE COULD HAVE NOTICED. OR THE OPPOSITE, BEING SO FIGETY OR RESTLESS THAT YOU HAVE BEEN MOVING AROUND A LOT MORE THAN USUAL: 1
9. THOUGHTS THAT YOU WOULD BE BETTER OFF DEAD, OR OF HURTING YOURSELF: 1
6. FEELING BAD ABOUT YOURSELF - OR THAT YOU ARE A FAILURE OR HAVE LET YOURSELF OR YOUR FAMILY DOWN: 1

## 2023-06-21 ASSESSMENT — ANXIETY QUESTIONNAIRES
2. NOT BEING ABLE TO STOP OR CONTROL WORRYING: 2
6. BECOMING EASILY ANNOYED OR IRRITABLE: 1
4. TROUBLE RELAXING: 2
GAD7 TOTAL SCORE: 12
1. FEELING NERVOUS, ANXIOUS, OR ON EDGE: 1
7. FEELING AFRAID AS IF SOMETHING AWFUL MIGHT HAPPEN: 2
3. WORRYING TOO MUCH ABOUT DIFFERENT THINGS: 2
5. BEING SO RESTLESS THAT IT IS HARD TO SIT STILL: 2
IF YOU CHECKED OFF ANY PROBLEMS ON THIS QUESTIONNAIRE, HOW DIFFICULT HAVE THESE PROBLEMS MADE IT FOR YOU TO DO YOUR WORK, TAKE CARE OF THINGS AT HOME, OR GET ALONG WITH OTHER PEOPLE: VERY DIFFICULT

## 2023-06-21 NOTE — PROGRESS NOTES
Tomy Perez (:  1975) is a 49 y.o. male,Established patient, here for evaluation of the following chief complaint(s):  Shoulder Pain (Left side) and Hand Pain (Weakness on left side)         ASSESSMENT/PLAN:  1. Injury of left rotator cuff, initial encounter  -     Kaity Lucio MD, Orthopedics and Sports Medicine (Knee; Shoulder), [place]      No follow-ups on file. Subjective   SUBJECTIVE/OBJECTIVE:  HPI patient comes in for follow-up of left arm pain and wrist pain. He had the sutures removed and notes some improvement in pain on his arm. However, he still has pain in his left shoulder. The patient did patient presented last window and continues to have the shoulder pain. He has difficulty with lifting his left arm. Review of Systems       Objective   Vitals:    23 1408   BP: 100/60   Pulse: 65   SpO2: 97%   Weight: 257 lb (116.6 kg)      Wt Readings from Last 3 Encounters:   23 257 lb (116.6 kg)   23 249 lb (112.9 kg)   23 257 lb (116.6 kg)     BP Readings from Last 3 Encounters:   23 100/60   23 118/76   23 114/84     Body mass index is 29.7 kg/m². Facility age limit for growth percentiles is 20 years. Physical Exam  Constitutional:       General: He is not in acute distress. Appearance: Normal appearance. HENT:      Right Ear: Tympanic membrane normal. There is no impacted cerumen. Left Ear: Tympanic membrane normal. There is no impacted cerumen. Nose: Nose normal. No congestion or rhinorrhea. Mouth/Throat:      Mouth: Mucous membranes are moist.      Pharynx: No oropharyngeal exudate or posterior oropharyngeal erythema. Musculoskeletal:      Left shoulder: Tenderness present. Decreased range of motion. Arms:    Neurological:      Mental Status: He is alert. An electronic signature was used to authenticate this note.     --Haile Hernadez MD

## 2023-06-27 NOTE — PROGRESS NOTES
2020     Tomy Perez (:  1975) is a 38 y.o. male, here for evaluation of the following medical concerns:    Back Pain   This is a new problem. The current episode started in the past 7 days. The problem has been rapidly worsening since onset. The pain is present in the gluteal. The pain does not radiate. The pain is severe. The pain is worse during the night. The symptoms are aggravated by twisting and bending. Risk factors include sedentary lifestyle. He has tried home exercises (physical therapy) for the symptoms. The treatment provided mild relief. Review of Systems   Musculoskeletal: Positive for back pain. Prior to Visit Medications    Medication Sig Taking? Authorizing Provider   predniSONE (DELTASONE) 10 MG tablet Take 4 tablets by mouth daily for 4 days, THEN 3 tablets daily for 4 days, THEN 2 tablets daily for 4 days, THEN 1 tablet daily for 4 days. Yes Juliane Wiseman MD   citalopram (CELEXA) 20 MG tablet Take 1 tablet by mouth daily Yes Juliane Wiseman MD   apixaban (ELIQUIS) 5 MG TABS tablet TAKE 1 TABLET BY MOUTH TWICE DAILY Yes Juliane Wiseman MD   valACYclovir (VALTREX) 1 g tablet Take 1 tablet by mouth daily Yes Juliane Wiseman MD   albuterol sulfate HFA (VENTOLIN HFA) 108 (90 Base) MCG/ACT inhaler Inhale 2 puffs into the lungs every 6 hours as needed for Wheezing Yes Juliane Wiseman MD   fluticasone-vilanterol (BREO ELLIPTA) 100-25 MCG/INH AEPB inhaler Inhale 1 puff into the lungs daily  Patient not taking: Reported on 2020  Juliane Wiseman MD        Social History     Tobacco Use    Smoking status: Former Smoker     Packs/day: 0.50     Years: 25.00     Pack years: 12.50     Types: Cigarettes     Last attempt to quit: 2015     Years since quittin.4    Smokeless tobacco: Never Used    Tobacco comment: started to smoke at 12 / smoked up to 1 to 1.5 p.p.d / quit smoking 2015   Substance Use Topics    Alcohol use:  Yes Alcohol/week: 0.0 standard drinks     Comment: socially        Vitals:    02/13/20 1500   BP: 124/82   Pulse: 87   SpO2: 96%   Weight: 246 lb (111.6 kg)     Estimated body mass index is 28.43 kg/m² as calculated from the following:    Height as of 2/25/19: 6' 6\" (1.981 m). Weight as of this encounter: 246 lb (111.6 kg). Physical Exam  HENT:      Mouth/Throat:      Mouth: Mucous membranes are moist.      Pharynx: Oropharynx is clear. No oropharyngeal exudate or posterior oropharyngeal erythema. Eyes:      General: No scleral icterus. Right eye: No discharge. Left eye: No discharge. Conjunctiva/sclera: Conjunctivae normal.      Pupils: Pupils are equal, round, and reactive to light. Neck:      Musculoskeletal: Neck supple. No neck rigidity. Cardiovascular:      Rate and Rhythm: Normal rate and regular rhythm. Heart sounds: No murmur. No friction rub. Pulmonary:      Effort: Pulmonary effort is normal. No respiratory distress. Breath sounds: No stridor. No wheezing or rhonchi. Musculoskeletal:      Lumbar back: He exhibits decreased range of motion, tenderness, bony tenderness, pain and spasm. Back:          ASSESSMENT/PLAN:  1. Degenerative disc disease, lumbar  worsening  - Inna - Amos Partida MD, Spine Surgery, [facility]    2. Acute right-sided low back pain without sciatica  - predniSONE (DELTASONE) 10 MG tablet; Take 4 tablets by mouth daily for 4 days, THEN 3 tablets daily for 4 days, THEN 2 tablets daily for 4 days, THEN 1 tablet daily for 4 days. Dispense: 40 tablet; Refill: 0      Return for Previously scheduled appt. An electronic signature was used to authenticate this note.     --Alessandra Bassett MD on 2/14/2020 at 9:01 AM Subsequent Stages Histo Method Verbiage: Using a similar technique to that described above, a thin layer of tissue was removed from all areas where tumor was visible on the previous stage.  The tissue was again oriented, mapped, dyed, and processed as above.

## 2023-06-28 ENCOUNTER — OFFICE VISIT (OUTPATIENT)
Dept: ORTHOPEDIC SURGERY | Age: 48
End: 2023-06-28

## 2023-06-28 VITALS — BODY MASS INDEX: 29.73 KG/M2 | WEIGHT: 257 LBS | HEIGHT: 78 IN | RESPIRATION RATE: 16 BRPM

## 2023-06-28 DIAGNOSIS — M79.631 PAIN IN BOTH FOREARMS: Primary | ICD-10-CM

## 2023-06-28 DIAGNOSIS — M79.632 PAIN IN BOTH FOREARMS: Primary | ICD-10-CM

## 2023-06-28 SDOH — HEALTH STABILITY: PHYSICAL HEALTH: ON AVERAGE, HOW MANY MINUTES DO YOU ENGAGE IN EXERCISE AT THIS LEVEL?: 0 MIN

## 2023-06-28 SDOH — HEALTH STABILITY: PHYSICAL HEALTH: ON AVERAGE, HOW MANY DAYS PER WEEK DO YOU ENGAGE IN MODERATE TO STRENUOUS EXERCISE (LIKE A BRISK WALK)?: 0 DAYS

## 2023-06-30 ENCOUNTER — OFFICE VISIT (OUTPATIENT)
Dept: ORTHOPEDIC SURGERY | Age: 48
End: 2023-06-30

## 2023-06-30 VITALS — BODY MASS INDEX: 29.73 KG/M2 | HEIGHT: 78 IN | WEIGHT: 257 LBS

## 2023-06-30 DIAGNOSIS — M25.512 LEFT SHOULDER PAIN, UNSPECIFIED CHRONICITY: Primary | ICD-10-CM

## 2023-06-30 SDOH — HEALTH STABILITY: PHYSICAL HEALTH: ON AVERAGE, HOW MANY DAYS PER WEEK DO YOU ENGAGE IN MODERATE TO STRENUOUS EXERCISE (LIKE A BRISK WALK)?: 0 DAYS

## 2023-06-30 SDOH — HEALTH STABILITY: PHYSICAL HEALTH: ON AVERAGE, HOW MANY MINUTES DO YOU ENGAGE IN EXERCISE AT THIS LEVEL?: 0 MIN

## 2023-06-30 ASSESSMENT — SOCIAL DETERMINANTS OF HEALTH (SDOH)

## 2023-07-01 DIAGNOSIS — F32.1 MODERATE SINGLE CURRENT EPISODE OF MAJOR DEPRESSIVE DISORDER (HCC): ICD-10-CM

## 2023-07-03 DIAGNOSIS — G25.81 RLS (RESTLESS LEGS SYNDROME): ICD-10-CM

## 2023-07-03 RX ORDER — CITALOPRAM 20 MG/1
TABLET ORAL
Qty: 60 TABLET | Refills: 2 | Status: SHIPPED | OUTPATIENT
Start: 2023-07-03

## 2023-07-03 RX ORDER — PRAMIPEXOLE DIHYDROCHLORIDE 0.25 MG/1
TABLET ORAL
Qty: 90 TABLET | Refills: 5 | Status: SHIPPED | OUTPATIENT
Start: 2023-07-03

## 2023-07-05 DIAGNOSIS — I26.99 BILATERAL PULMONARY EMBOLISM (HCC): ICD-10-CM

## 2023-07-06 RX ORDER — APIXABAN 5 MG/1
TABLET, FILM COATED ORAL
Qty: 180 TABLET | Refills: 2 | Status: SHIPPED | OUTPATIENT
Start: 2023-07-06

## 2023-07-06 NOTE — TELEPHONE ENCOUNTER
Recent Visits  Date Type Provider Dept   06/21/23 Office Visit Patrica Bonilla MD [facility]   06/16/23 Office Visit Patrica Bonilla MD [facility]   03/16/23 Office Visit Patrica Bonilla MD [facility]   12/30/22 Office Visit Patrica Bonilla MD [facility]   09/16/22 Office Visit Patrica Bonilla MD [facility]   08/10/22 Office Visit Patrica Bonilla MD [facility]   06/08/22 Office Visit Patrica Bonilla MD [facility]   05/24/22 Office Visit Patrica Bonilla MD [facility]   04/19/22 Office Visit Patrica Bonilla MD [facility]   03/21/22 Office Visit Patrica Bonilla MD [facility]   Showing recent visits within past 540 days with a meds authorizing provider and meeting all other requirements  Future Appointments  No visits were found meeting these conditions.   Showing future appointments within next 150 days with a meds authorizing provider and meeting all other requirements     6/21/2023

## 2023-07-17 ENCOUNTER — OFFICE VISIT (OUTPATIENT)
Dept: ORTHOPEDIC SURGERY | Age: 48
End: 2023-07-17

## 2023-07-17 VITALS — WEIGHT: 257 LBS | BODY MASS INDEX: 29.73 KG/M2 | HEIGHT: 78 IN

## 2023-07-17 DIAGNOSIS — S46.912A STRAIN OF LEFT SHOULDER, INITIAL ENCOUNTER: ICD-10-CM

## 2023-07-17 DIAGNOSIS — M75.82 ROTATOR CUFF TENDINITIS, LEFT: Primary | ICD-10-CM

## 2023-07-17 RX ORDER — METHYLPREDNISOLONE 4 MG/1
TABLET ORAL
Qty: 1 KIT | Refills: 0 | Status: SHIPPED | OUTPATIENT
Start: 2023-07-17

## 2023-07-21 NOTE — TELEPHONE ENCOUNTER
Letter is in the chart. Please print and provide to patient. Bexarotene Counseling:  I discussed with the patient the risks of bexarotene including but not limited to hair loss, dry lips/skin/eyes, liver abnormalities, hyperlipidemia, pancreatitis, depression/suicidal ideation, photosensitivity, drug rash/allergic reactions, hypothyroidism, anemia, leukopenia, infection, cataracts, and teratogenicity.  Patient understands that they will need regular blood tests to check lipid profile, liver function tests, white blood cell count, thyroid function tests and pregnancy test if applicable.

## 2023-07-27 ENCOUNTER — HOSPITAL ENCOUNTER (OUTPATIENT)
Dept: PHYSICAL THERAPY | Age: 48
Setting detail: THERAPIES SERIES
Discharge: HOME | End: 2023-07-27
Payer: COMMERCIAL

## 2023-07-27 PROCEDURE — 97161 PT EVAL LOW COMPLEX 20 MIN: CPT | Performed by: PHYSICAL THERAPIST

## 2023-07-27 PROCEDURE — 97140 MANUAL THERAPY 1/> REGIONS: CPT | Performed by: PHYSICAL THERAPIST

## 2023-07-30 NOTE — FLOWSHEET NOTE
[facility] and [address] [place] [address] [address],  [facility], [address] [place] [address]  [address]  Phone: [phone]  Fax: [phone]      Physical Therapy Daily Treatment Note  Date:  2023    Patient Name:  Lawrence Kim    :  1975  MRN: [id number]  Restrictions/Precautions:    Medical/Treatment Diagnosis Information:  Rotator cuff tendinitis, left [M75.82]  Strain of left shoulder, initial encounter [N13.830Q]     Insurance/Certification information:     Physician Information:   Chuyita Watts MD    Has the plan of care been signed (Y/N):        []  Yes  [x]  No     Date of Patient follow up with Physician:       Is this a Progress Report:     []  Yes  [x]  No        If Yes:  Date Range for reporting period:  Beginning  Ending    Progress report will be due (10 Rx or 30 days whichever is less):        Recertification will be due (POC Duration  / 90 days whichever is less):          Visit # Insurance Allowable Auth Required   1  []  Yes []  No        OUTCOME MEASURE DATE SCORE   UEFI              Latex Allergy:  [x]NO      []YES  Preferred Language for Healthcare:   [x]English       []other:    Pain level:  /10     SUBJECTIVE:  See eval    OBJECTIVE: See eval  Observation:   Test measurements:      RESTRICTIONS/PRECAUTIONS:     Exercises/Interventions:   Exercise/Equipment Resistance/Repetitions Other comments   Stretching/PROM     Wand     Table Slides     UE [place]     Pulleys     Pendulum     Wall stretch  4 x 10\"    Isometrics     Retraction          Weight shift     Flexion     Abduction     External Rotation     Internal Rotation     Biceps     Triceps          PRE's     Flexion     Abduction     External Rotation 20x sidelying     Internal Rotation     Shrugs     EXT     Reverse Flys     Serratus     Horizontal Abd with ER     Biceps     Triceps     Retraction     Therabands: rows, ir 30x green     [employer]

## 2023-08-03 ENCOUNTER — HOSPITAL ENCOUNTER (OUTPATIENT)
Dept: PHYSICAL THERAPY | Age: 48
Setting detail: THERAPIES SERIES
Discharge: HOME | End: 2023-08-03

## 2023-08-04 NOTE — FLOWSHEET NOTE
Column/Theraband     External Rotation     Internal Rotation     Shrugs     Lats     Ext     Flex     Scapular Retraction     BIC     TRIC     PNF          Dynamic Stability          [place]          Manual interventions     Prom/stm  17'                Therapeutic Exercise and NMR EXR  [] (43385) Provided verbal/tactile cueing for activities related to strengthening, flexibility, endurance, ROM  for improvements in scapular, scapulothoracic and UE control with self care, reaching, carrying, lifting, house/yardwork, driving/computer work.    [] (12062) Provided verbal/tactile cueing for activities related to improving balance, coordination, kinesthetic sense, posture, motor skill, proprioception  to assist with  scapular, scapulothoracic and UE control with self care, reaching, carrying, lifting, house/yardwork, driving/computer work. Therapeutic Activities:    [x] (25194 or 97969) Provided verbal/tactile cueing for activities related to improving balance, coordination, kinesthetic sense, posture, motor skill, proprioception and motor activation to allow for proper function of scapular, scapulothoracic and UE control with self care, carrying, lifting, driving/computer work.      Home Exercise Program:    [x] (02175) Reviewed/Progressed HEP activities related to strengthening, flexibility, endurance, ROM of scapular, scapulothoracic and UE control with self care, reaching, carrying, lifting, house/yardwork, driving/computer work  [] (64249) Reviewed/Progressed HEP activities related to improving balance, coordination, kinesthetic sense, posture, motor skill, proprioception of scapular, scapulothoracic and UE control with self care, reaching, carrying, lifting, house/yardwork, driving/computer work      Manual Treatments:  PROM / STM / Oscillations-Mobs:  G-I, II, III, IV (PA's, Inf., Post.)  [x] (85896) Provided manual therapy to mobilize soft tissue/joints of cervical/CT, scapular GHJ and UE for the purpose of

## 2023-08-23 ENCOUNTER — HOSPITAL ENCOUNTER (OUTPATIENT)
Dept: PHYSICAL THERAPY | Age: 48
Setting detail: THERAPIES SERIES
Discharge: HOME | End: 2023-08-23
Payer: COMMERCIAL

## 2023-08-23 PROCEDURE — 97110 THERAPEUTIC EXERCISES: CPT | Performed by: SPECIALIST/TECHNOLOGIST

## 2023-08-23 PROCEDURE — 97140 MANUAL THERAPY 1/> REGIONS: CPT | Performed by: SPECIALIST/TECHNOLOGIST

## 2023-08-23 NOTE — FLOWSHEET NOTE
[facility] and [address] [place] [address] [address],  [facility], [address] [place] [address]  200 [facility], [address] [place] [address] Avenue  Phone: [phone]  Fax: [phone]      Physical Therapy Daily Treatment Note  Date:  2023    Patient Name:  Susanne See    :  1975  MRN: [id number]  Restrictions/Precautions:    Medical/Treatment Diagnosis Information:  Rotator cuff tendinitis, left [M75.82]  Strain of left shoulder, initial encounter [A89.602O]     Insurance/Certification information:     Physician Information:   Thang Perez MD    Has the plan of care been signed (Y/N):        []  Yes  [x]  No     Date of Patient follow up with Physician:       Is this a Progress Report:     []  Yes  [x]  No        If Yes:  Date Range for reporting period:  Beginning  Ending    Progress report will be due (10 Rx or 30 days whichever is less):        Recertification will be due (POC Duration  / 90 days whichever is less):          Visit # Insurance Allowable Auth Required   2  []  Yes []  No        OUTCOME MEASURE DATE SCORE   UEFI            Pt. Did not attend PT 23 - 23. Latex Allergy:  [x]NO      []YES  Preferred Language for Healthcare:   [x]English       []other:    Pain level:  /10     SUBJECTIVE:  Pt. Reports he had a family emergency is why he has not been attending PT. Pt. Reports he has been non-compliant with his HEP.       OBJECTIVE: See eval  Observation:   Test measurements:      RESTRICTIONS/PRECAUTIONS:     Exercises/Interventions:   Exercise/Equipment Resistance/Repetitions Other comments   Stretching/PROM     Wand     Sleeper stretch 10 x 10\"     UE [place]     Pulleys     Posterior cap stretch 10 x 10\"     Wall stretch  4 x 10\"    Isometrics     Retraction          Weight shift     Flexion     Abduction     External Rotation     Internal Rotation     Biceps     Triceps          PRE's     Flexion     Abduction     External Rotation

## 2023-08-27 DIAGNOSIS — F33.1 MODERATE EPISODE OF RECURRENT MAJOR DEPRESSIVE DISORDER (HCC): ICD-10-CM

## 2023-08-28 ENCOUNTER — HOSPITAL ENCOUNTER (OUTPATIENT)
Dept: PHYSICAL THERAPY | Age: 48
Setting detail: THERAPIES SERIES
Discharge: HOME | End: 2023-08-28
Payer: COMMERCIAL

## 2023-08-28 RX ORDER — ARIPIPRAZOLE 5 MG/1
5 TABLET ORAL DAILY
Qty: 30 TABLET | Refills: 3 | OUTPATIENT
Start: 2023-08-28

## 2023-08-28 NOTE — FLOWSHEET NOTE
[facility] and [address] [place] [address] [address],  [facility], [address] [place] [address], [facility] [address]  Phone: [phone]  Fax: [phone]      Physical Therapy  Cancellation/No-show Note  Patient Name:  Nely Carreon  :  1975   Date:  2023  Cancelled visits to date: 0  No-shows to date: 1    For today's appointment patient:  []  Cancelled  []  Rescheduled appointment  [x]  No-show     Reason given by patient:  []  Patient ill  []  Conflicting appointment  []  No transportation    []  Conflict with work  []  No reason given  []  Other:     Comments:      Electronically signed by:  Lalita Vee, [address]

## 2023-08-30 ENCOUNTER — HOSPITAL ENCOUNTER (OUTPATIENT)
Dept: PHYSICAL THERAPY | Age: 48
Setting detail: THERAPIES SERIES
Discharge: HOME | End: 2023-08-30
Payer: COMMERCIAL

## 2023-08-30 DIAGNOSIS — U09.9 COVID-19 LONG HAULER: ICD-10-CM

## 2023-08-30 DIAGNOSIS — G25.81 RLS (RESTLESS LEGS SYNDROME): ICD-10-CM

## 2023-08-30 DIAGNOSIS — M62.838 MUSCLE SPASM: ICD-10-CM

## 2023-08-30 DIAGNOSIS — F33.1 MODERATE EPISODE OF RECURRENT MAJOR DEPRESSIVE DISORDER (HCC): ICD-10-CM

## 2023-08-30 DIAGNOSIS — B00.9 HSV INFECTION: ICD-10-CM

## 2023-08-30 NOTE — FLOWSHEET NOTE
[facility] and [address] [place] [address] [address],  [facility], [address] [place] [address], [address] [place] [address] Avenue  Phone: [phone]  Fax: [phone]      Physical Therapy  Cancellation/No-show Note  Patient Name:  Julianna Melton  :  1975   Date:  2023  Cancelled visits to date: 0  No-shows to date: 2    For today's appointment patient:  []  Cancelled  []  Rescheduled appointment  [x]  No-show     Reason given by patient:  []  Patient ill  []  Conflicting appointment  []  No transportation    []  Conflict with work  []  No reason given  []  Other:     Comments:      Electronically signed by:  Vaibhav Rodriguez, [address]

## 2023-08-31 RX ORDER — ALBUTEROL SULFATE 90 UG/1
2 AEROSOL, METERED RESPIRATORY (INHALATION) EVERY 6 HOURS PRN
Qty: 18 G | Refills: 5 | OUTPATIENT
Start: 2023-08-31 | End: 2024-08-30

## 2023-08-31 RX ORDER — VALACYCLOVIR HYDROCHLORIDE 500 MG/1
500 TABLET, FILM COATED ORAL DAILY
Qty: 30 TABLET | Refills: 5 | Status: SHIPPED | OUTPATIENT
Start: 2023-08-31

## 2023-08-31 RX ORDER — ALBUTEROL SULFATE 90 UG/1
2 AEROSOL, METERED RESPIRATORY (INHALATION) EVERY 4 HOURS PRN
Qty: 1 EACH | Refills: 11 | OUTPATIENT
Start: 2023-08-31 | End: 2024-08-30

## 2023-08-31 RX ORDER — PRAMIPEXOLE DIHYDROCHLORIDE 0.25 MG/1
TABLET ORAL
Qty: 90 TABLET | Refills: 5 | Status: SHIPPED | OUTPATIENT
Start: 2023-08-31

## 2023-08-31 RX ORDER — BUDESONIDE, GLYCOPYRROLATE, AND FORMOTEROL FUMARATE 160; 9; 4.8 UG/1; UG/1; UG/1
2 AEROSOL, METERED RESPIRATORY (INHALATION) 2 TIMES DAILY
Qty: 2 EACH | Refills: 0 | Status: SHIPPED | OUTPATIENT
Start: 2023-08-31

## 2023-08-31 RX ORDER — TIZANIDINE 4 MG/1
4 TABLET ORAL 3 TIMES DAILY PRN
Qty: 30 TABLET | Refills: 0 | Status: SHIPPED | OUTPATIENT
Start: 2023-08-31

## 2023-08-31 NOTE — TELEPHONE ENCOUNTER
If appropriate please refill pending medication ALBUTEROL INHALER    Last office visit : 1/2021  Next office visit : NO FUTURE APPTS  Thank you

## 2023-08-31 NOTE — TELEPHONE ENCOUNTER
Tomy has not been seen since Jan 2021 and was a no show 4/2021.  Will need to schedule OV prior to prescriptions

## 2023-09-18 ENCOUNTER — TELEPHONE (OUTPATIENT)
Dept: INTERNAL MEDICINE CLINIC | Age: 48
End: 2023-09-18

## 2023-09-19 NOTE — TELEPHONE ENCOUNTER
Advised pt the information was faxed over 09/08/2023 and that the information goes directly to the company pt was also given the number to [address] [place] [address] who will process these records

## 2023-10-02 ENCOUNTER — OFFICE VISIT (OUTPATIENT)
Dept: INTERNAL MEDICINE CLINIC | Age: 48
End: 2023-10-02
Payer: COMMERCIAL

## 2023-10-02 VITALS
SYSTOLIC BLOOD PRESSURE: 108 MMHG | HEART RATE: 57 BPM | BODY MASS INDEX: 29.12 KG/M2 | OXYGEN SATURATION: 96 % | DIASTOLIC BLOOD PRESSURE: 60 MMHG | WEIGHT: 252 LBS

## 2023-10-02 DIAGNOSIS — M62.838 MUSCLE SPASM: ICD-10-CM

## 2023-10-02 DIAGNOSIS — J30.89 ENVIRONMENTAL AND SEASONAL ALLERGIES: ICD-10-CM

## 2023-10-02 DIAGNOSIS — F33.1 MODERATE EPISODE OF RECURRENT MAJOR DEPRESSIVE DISORDER (HCC): ICD-10-CM

## 2023-10-02 DIAGNOSIS — S29.012A STRAIN OF LATISSIMUS DORSI MUSCLE, INITIAL ENCOUNTER: Primary | ICD-10-CM

## 2023-10-02 PROCEDURE — 99204 OFFICE O/P NEW MOD 45 MIN: CPT | Performed by: STUDENT IN AN ORGANIZED HEALTH CARE EDUCATION/TRAINING PROGRAM

## 2023-10-02 RX ORDER — LIDOCAINE 4 G/G
1 PATCH TOPICAL DAILY
Qty: 30 PATCH | Refills: 0 | Status: SHIPPED | OUTPATIENT
Start: 2023-10-02 | End: 2023-11-01

## 2023-10-02 RX ORDER — CITALOPRAM 40 MG/1
40 TABLET ORAL DAILY
Qty: 30 TABLET | Refills: 2 | Status: SHIPPED | OUTPATIENT
Start: 2023-10-02 | End: 2023-12-31

## 2023-10-02 RX ORDER — TIZANIDINE 4 MG/1
4 TABLET ORAL 3 TIMES DAILY PRN
Qty: 30 TABLET | Refills: 0 | Status: SHIPPED | OUTPATIENT
Start: 2023-10-02

## 2023-10-02 RX ORDER — LEVOCETIRIZINE DIHYDROCHLORIDE 5 MG/1
5 TABLET, FILM COATED ORAL PRN
Qty: 30 TABLET | Refills: 2 | Status: SHIPPED | OUTPATIENT
Start: 2023-10-02 | End: 2023-12-31

## 2023-10-02 ASSESSMENT — PATIENT HEALTH QUESTIONNAIRE - PHQ9
7. TROUBLE CONCENTRATING ON THINGS, SUCH AS READING THE NEWSPAPER OR WATCHING TELEVISION: 3
10. IF YOU CHECKED OFF ANY PROBLEMS, HOW DIFFICULT HAVE THESE PROBLEMS MADE IT FOR YOU TO DO YOUR WORK, TAKE CARE OF THINGS AT HOME, OR GET ALONG WITH OTHER PEOPLE: 2
4. FEELING TIRED OR HAVING LITTLE ENERGY: 3
8. MOVING OR SPEAKING SO SLOWLY THAT OTHER PEOPLE COULD HAVE NOTICED. OR THE OPPOSITE, BEING SO FIGETY OR RESTLESS THAT YOU HAVE BEEN MOVING AROUND A LOT MORE THAN USUAL: 3
2. FEELING DOWN, DEPRESSED OR HOPELESS: 1
5. POOR APPETITE OR OVEREATING: 3
SUM OF ALL RESPONSES TO PHQ QUESTIONS 1-9: 22
SUM OF ALL RESPONSES TO PHQ9 QUESTIONS 1 & 2: 4
SUM OF ALL RESPONSES TO PHQ QUESTIONS 1-9: 22
SUM OF ALL RESPONSES TO PHQ QUESTIONS 1-9: 20
3. TROUBLE FALLING OR STAYING ASLEEP: 3
6. FEELING BAD ABOUT YOURSELF - OR THAT YOU ARE A FAILURE OR HAVE LET YOURSELF OR YOUR FAMILY DOWN: 1
1. LITTLE INTEREST OR PLEASURE IN DOING THINGS: 3
9. THOUGHTS THAT YOU WOULD BE BETTER OFF DEAD, OR OF HURTING YOURSELF: 2
SUM OF ALL RESPONSES TO PHQ QUESTIONS 1-9: 22

## 2023-10-02 NOTE — PATIENT INSTRUCTIONS
Thank you for allowing me to care for you!     Patient instructions:  Ibuprofen or tylenol as needed  Lidocaine patch   Get involved in hobbies, community programs   Consider Orthodoxy  Stretches for latissimus dorsi

## 2023-10-02 NOTE — PROGRESS NOTES
2015     Years since quittin.0    Smokeless tobacco: Never    Tobacco comments:     started to smoke at 14 / smoked up to 1 to 1.5 p.p.d / quit smoking 2015   Vaping Use    Vaping Use: Former    Substances: Always   Substance and Sexual Activity    Alcohol use: Yes     Alcohol/week: 2.0 standard drinks of alcohol     Types: 2 Cans of beer per week     Comment: socially    Drug use: No    Sexual activity: Yes     Partners: Female     Comment: 1 child   Other Topics Concern    Not on file   Social History Narrative    Lives with mother-December 10, 2020      Social Determinants of Health     Financial Resource Strain: High Risk (3/16/2023)    Overall Financial Resource Strain (CARDIA)     Difficulty of Paying Living Expenses: Hard   Food Insecurity: Food Insecurity Present (3/16/2023)    Hunger Vital Sign     Worried About Running Out of Food in the Last Year: Often true     Ran Out of Food in the Last Year: Often true   Transportation Needs: Unknown (3/16/2023)    PRAPARE - Transportation     Lack of Transportation (Medical): Not on file     Lack of Transportation (Non-Medical):  No   Physical Activity: Inactive (2023)    Exercise Vital Sign     Days of Exercise per Week: 0 days     Minutes of Exercise per Session: 0 min   Stress: Not on file   Social Connections: Not on file   Intimate Partner Violence: Not At Risk (2023)    Humiliation, Afraid, Rape, and Kick questionnaire     Fear of Current or Ex-Partner: No     Emotionally Abused: No     Physically Abused: No     Sexually Abused: No   Housing Stability: Unknown (3/16/2023)    Housing Stability Vital Sign     Unable to Pay for Housing in the Last Year: Not on file     Number of [address] in the Last Year: Not on file     Unstable Housing in the Last Year: No     Past Surgical History:   Procedure Laterality Date    APPENDECTOMY  2019    COLONOSCOPY N/A 3/8/2021    COLONOSCOPY WITH ANESTHESIA -SLEEP APNEA- performed by Elissa Leos MD at

## 2023-10-07 ENCOUNTER — TELEPHONE (OUTPATIENT)
Dept: ORTHOPEDIC SURGERY | Age: 48
End: 2023-10-07

## 2023-10-08 ENCOUNTER — TELEPHONE (OUTPATIENT)
Dept: INTERNAL MEDICINE CLINIC | Age: 48
End: 2023-10-08

## 2023-10-09 NOTE — TELEPHONE ENCOUNTER
Patient brought in some Long Term Disability Forms. The were completed by Dr. Demario Durand. Therefore, we will not complete our set of forms. Feel free to place in the HIPAA box.

## 2023-10-16 NOTE — TELEPHONE ENCOUNTER
I attempted to reach patient however, VM box is full if he calls back please have him fax or bring in paperwork to the office. Detail Level: Detailed Add 18875 Cpt? (Important Note: In 2017 The Use Of 86360 Is Being Tracked By Cms To Determine Future Global Period Reimbursement For Global Periods): yes

## 2023-10-26 ENCOUNTER — OFFICE VISIT (OUTPATIENT)
Dept: PSYCHIATRY | Age: 48
End: 2023-10-26
Payer: COMMERCIAL

## 2023-10-26 VITALS
HEIGHT: 78 IN | BODY MASS INDEX: 29.39 KG/M2 | DIASTOLIC BLOOD PRESSURE: 78 MMHG | OXYGEN SATURATION: 96 % | SYSTOLIC BLOOD PRESSURE: 122 MMHG | WEIGHT: 254 LBS | RESPIRATION RATE: 16 BRPM | HEART RATE: 64 BPM

## 2023-10-26 DIAGNOSIS — F90.2 ATTENTION DEFICIT HYPERACTIVITY DISORDER (ADHD), COMBINED TYPE: Primary | ICD-10-CM

## 2023-10-26 PROCEDURE — 99214 OFFICE O/P EST MOD 30 MIN: CPT | Performed by: PSYCHIATRY & NEUROLOGY

## 2023-10-26 PROCEDURE — G8484 FLU IMMUNIZE NO ADMIN: HCPCS | Performed by: PSYCHIATRY & NEUROLOGY

## 2023-10-26 PROCEDURE — G8427 DOCREV CUR MEDS BY ELIG CLIN: HCPCS | Performed by: PSYCHIATRY & NEUROLOGY

## 2023-10-26 PROCEDURE — G8417 CALC BMI ABV UP PARAM F/U: HCPCS | Performed by: PSYCHIATRY & NEUROLOGY

## 2023-10-26 PROCEDURE — 1036F TOBACCO NON-USER: CPT | Performed by: PSYCHIATRY & NEUROLOGY

## 2023-10-26 RX ORDER — BUPROPION HYDROCHLORIDE 150 MG/1
150 TABLET ORAL EVERY MORNING
Qty: 30 TABLET | Refills: 1 | Status: SHIPPED | OUTPATIENT
Start: 2023-10-26

## 2023-10-26 ASSESSMENT — PATIENT HEALTH QUESTIONNAIRE - PHQ9
10. IF YOU CHECKED OFF ANY PROBLEMS, HOW DIFFICULT HAVE THESE PROBLEMS MADE IT FOR YOU TO DO YOUR WORK, TAKE CARE OF THINGS AT HOME, OR GET ALONG WITH OTHER PEOPLE: 2
SUM OF ALL RESPONSES TO PHQ QUESTIONS 1-9: 16
7. TROUBLE CONCENTRATING ON THINGS, SUCH AS READING THE NEWSPAPER OR WATCHING TELEVISION: 2
2. FEELING DOWN, DEPRESSED OR HOPELESS: 2
8. MOVING OR SPEAKING SO SLOWLY THAT OTHER PEOPLE COULD HAVE NOTICED. OR THE OPPOSITE, BEING SO FIGETY OR RESTLESS THAT YOU HAVE BEEN MOVING AROUND A LOT MORE THAN USUAL: 2
SUM OF ALL RESPONSES TO PHQ QUESTIONS 1-9: 17
9. THOUGHTS THAT YOU WOULD BE BETTER OFF DEAD, OR OF HURTING YOURSELF: 1
SUM OF ALL RESPONSES TO PHQ QUESTIONS 1-9: 17
SUM OF ALL RESPONSES TO PHQ QUESTIONS 1-9: 17
1. LITTLE INTEREST OR PLEASURE IN DOING THINGS: 2
4. FEELING TIRED OR HAVING LITTLE ENERGY: 2
SUM OF ALL RESPONSES TO PHQ9 QUESTIONS 1 & 2: 4
6. FEELING BAD ABOUT YOURSELF - OR THAT YOU ARE A FAILURE OR HAVE LET YOURSELF OR YOUR FAMILY DOWN: 2
3. TROUBLE FALLING OR STAYING ASLEEP: 2
5. POOR APPETITE OR OVEREATING: 2

## 2023-10-26 ASSESSMENT — ANXIETY QUESTIONNAIRES
5. BEING SO RESTLESS THAT IT IS HARD TO SIT STILL: 2
IF YOU CHECKED OFF ANY PROBLEMS ON THIS QUESTIONNAIRE, HOW DIFFICULT HAVE THESE PROBLEMS MADE IT FOR YOU TO DO YOUR WORK, TAKE CARE OF THINGS AT HOME, OR GET ALONG WITH OTHER PEOPLE: VERY DIFFICULT
4. TROUBLE RELAXING: 3
GAD7 TOTAL SCORE: 12
1. FEELING NERVOUS, ANXIOUS, OR ON EDGE: 2
2. NOT BEING ABLE TO STOP OR CONTROL WORRYING: 1
7. FEELING AFRAID AS IF SOMETHING AWFUL MIGHT HAPPEN: 1
3. WORRYING TOO MUCH ABOUT DIFFERENT THINGS: 1
6. BECOMING EASILY ANNOYED OR IRRITABLE: 2

## 2023-10-26 NOTE — PROGRESS NOTES
PSYCHIATRY PROGRESS NOTE    Tomy Perez : 1975  10/26/23  PCP: Martín Jenkins MD    Chief Complaint   Patient presents with    Depression       S:   Pt was last seen on 2023, at which time we initiated him on atomoxetine for ADHD and recommended he return in 4-6 weeks. He has not gotten refills of this medication since then and did not follow up. He presents today with a request for his long term disability forms to be completed for his mental health diagnosis and status. He has not been seeing a therapist or other mental health provider since our last visit, but is scheduled to see a new therapist this after Yas Guzman at [facility]). Pt reports ongoing problems with depression that have been a chronic struggle. Symptoms include anhedonia, depressed mood, some sense of hopelessness, fatigue, poor concentration (reports being easily distracted, difficulty following through, forgetfulness, delaying initiation of tasks), feeling of guilt, and occasional thoughts about being better off not alive. 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 He continues to not be on treatment for his severe VU. He has not follow up with sleep medicine. He doesn't think he wants to get the hypoglossal nerve stimulator due to the invasive nature. Stressors include his son being in the home now, daughter being in the home and pregnant, and having to care for his mother who had neck surgery and needs a lot of physical support. With these demands he finds it hard to have time to himself. He has poor sleep at night then tends to sleep well during the day. Has a girlfriend which can sometimes be a source of stress. Deals with RLS which can make sleep difficult, but pramipexole helps. Does use MJ, but very infrequently. ETOH use about 4-5 drinks per week.      ROS: no adverse physical symptoms

## 2023-11-01 ENCOUNTER — OFFICE VISIT (OUTPATIENT)
Dept: INTERNAL MEDICINE CLINIC | Age: 48
End: 2023-11-01
Payer: COMMERCIAL

## 2023-11-01 VITALS
BODY MASS INDEX: 30.04 KG/M2 | WEIGHT: 254.4 LBS | OXYGEN SATURATION: 98 % | HEART RATE: 70 BPM | DIASTOLIC BLOOD PRESSURE: 78 MMHG | RESPIRATION RATE: 18 BRPM | SYSTOLIC BLOOD PRESSURE: 118 MMHG | HEIGHT: 77 IN | TEMPERATURE: 97.1 F

## 2023-11-01 DIAGNOSIS — G62.9 NEUROPATHY: ICD-10-CM

## 2023-11-01 DIAGNOSIS — F32.5 MAJOR DEPRESSIVE DISORDER WITH SINGLE EPISODE, IN FULL REMISSION (HCC): ICD-10-CM

## 2023-11-01 DIAGNOSIS — F41.1 GAD (GENERALIZED ANXIETY DISORDER): Primary | Chronic | ICD-10-CM

## 2023-11-01 LAB
ALBUMIN SERPL-MCNC: 5 G/DL (ref 3.4–5)
ALBUMIN/GLOB SERPL: 2 {RATIO} (ref 1.1–2.2)
ALP SERPL-CCNC: 74 U/L (ref 40–129)
ALT SERPL-CCNC: 35 U/L (ref 10–40)
ANION GAP SERPL CALCULATED.3IONS-SCNC: 8 MMOL/L (ref 3–16)
AST SERPL-CCNC: 22 U/L (ref 15–37)
BASOPHILS # BLD: 0 K/UL (ref 0–0.2)
BASOPHILS NFR BLD: 0.3 %
BILIRUB SERPL-MCNC: 0.3 MG/DL (ref 0–1)
BUN SERPL-MCNC: 15 MG/DL (ref 7–20)
CALCIUM SERPL-MCNC: 9.8 MG/DL (ref 8.3–10.6)
CHLORIDE SERPL-SCNC: 104 MMOL/L (ref 99–110)
CO2 SERPL-SCNC: 29 MMOL/L (ref 21–32)
CREAT SERPL-MCNC: 1.3 MG/DL (ref 0.9–1.3)
CRP SERPL-MCNC: <3 MG/L (ref 0–5.1)
DEPRECATED RDW RBC AUTO: 14 % (ref 12.4–15.4)
EOSINOPHIL # BLD: 0.1 K/UL (ref 0–0.6)
EOSINOPHIL NFR BLD: 1.5 %
ERYTHROCYTE [SEDIMENTATION RATE] IN BLOOD BY WESTERGREN METHOD: 9 MM/HR (ref 0–15)
FOLATE SERPL-MCNC: 14.42 NG/ML (ref 4.78–24.2)
GFR SERPLBLD CREATININE-BSD FMLA CKD-EPI: >60 ML/MIN/{1.73_M2}
GLUCOSE SERPL-MCNC: 101 MG/DL (ref 70–99)
HCT VFR BLD AUTO: 46.3 % (ref 40.5–52.5)
HGB BLD-MCNC: 15.6 G/DL (ref 13.5–17.5)
LYMPHOCYTES # BLD: 1.6 K/UL (ref 1–5.1)
LYMPHOCYTES NFR BLD: 38.4 %
MCH RBC QN AUTO: 29.3 PG (ref 26–34)
MCHC RBC AUTO-ENTMCNC: 33.7 G/DL (ref 31–36)
MCV RBC AUTO: 87 FL (ref 80–100)
MONOCYTES # BLD: 0.4 K/UL (ref 0–1.3)
MONOCYTES NFR BLD: 9.5 %
NEUTROPHILS # BLD: 2.1 K/UL (ref 1.7–7.7)
NEUTROPHILS NFR BLD: 50.3 %
PLATELET # BLD AUTO: 164 K/UL (ref 135–450)
PMV BLD AUTO: 8.9 FL (ref 5–10.5)
POTASSIUM SERPL-SCNC: 4.5 MMOL/L (ref 3.5–5.1)
PROT SERPL-MCNC: 7.5 G/DL (ref 6.4–8.2)
RBC # BLD AUTO: 5.32 M/UL (ref 4.2–5.9)
SODIUM SERPL-SCNC: 141 MMOL/L (ref 136–145)
VIT B12 SERPL-MCNC: 469 PG/ML (ref 211–911)
WBC # BLD AUTO: 4.2 K/UL (ref 4–11)

## 2023-11-01 PROCEDURE — G8417 CALC BMI ABV UP PARAM F/U: HCPCS | Performed by: INTERNAL MEDICINE

## 2023-11-01 PROCEDURE — G8427 DOCREV CUR MEDS BY ELIG CLIN: HCPCS | Performed by: INTERNAL MEDICINE

## 2023-11-01 PROCEDURE — 1036F TOBACCO NON-USER: CPT | Performed by: INTERNAL MEDICINE

## 2023-11-01 PROCEDURE — G8484 FLU IMMUNIZE NO ADMIN: HCPCS | Performed by: INTERNAL MEDICINE

## 2023-11-01 PROCEDURE — 99214 OFFICE O/P EST MOD 30 MIN: CPT | Performed by: INTERNAL MEDICINE

## 2023-11-01 RX ORDER — FAMOTIDINE 40 MG/1
40 TABLET, FILM COATED ORAL 2 TIMES DAILY
COMMUNITY
Start: 2023-08-31

## 2023-11-01 ASSESSMENT — ANXIETY QUESTIONNAIRES
IF YOU CHECKED OFF ANY PROBLEMS ON THIS QUESTIONNAIRE, HOW DIFFICULT HAVE THESE PROBLEMS MADE IT FOR YOU TO DO YOUR WORK, TAKE CARE OF THINGS AT HOME, OR GET ALONG WITH OTHER PEOPLE: VERY DIFFICULT
2. NOT BEING ABLE TO STOP OR CONTROL WORRYING: 2
GAD7 TOTAL SCORE: 13
3. WORRYING TOO MUCH ABOUT DIFFERENT THINGS: 2
7. FEELING AFRAID AS IF SOMETHING AWFUL MIGHT HAPPEN: 1
6. BECOMING EASILY ANNOYED OR IRRITABLE: 2
1. FEELING NERVOUS, ANXIOUS, OR ON EDGE: 2
5. BEING SO RESTLESS THAT IT IS HARD TO SIT STILL: 2
4. TROUBLE RELAXING: 2

## 2023-11-01 ASSESSMENT — PATIENT HEALTH QUESTIONNAIRE - PHQ9
4. FEELING TIRED OR HAVING LITTLE ENERGY: 2
2. FEELING DOWN, DEPRESSED OR HOPELESS: 2
SUM OF ALL RESPONSES TO PHQ QUESTIONS 1-9: 15
8. MOVING OR SPEAKING SO SLOWLY THAT OTHER PEOPLE COULD HAVE NOTICED. OR THE OPPOSITE, BEING SO FIGETY OR RESTLESS THAT YOU HAVE BEEN MOVING AROUND A LOT MORE THAN USUAL: 1
SUM OF ALL RESPONSES TO PHQ QUESTIONS 1-9: 16
9. THOUGHTS THAT YOU WOULD BE BETTER OFF DEAD, OR OF HURTING YOURSELF: 1
SUM OF ALL RESPONSES TO PHQ QUESTIONS 1-9: 16
5. POOR APPETITE OR OVEREATING: 2
3. TROUBLE FALLING OR STAYING ASLEEP: 2
7. TROUBLE CONCENTRATING ON THINGS, SUCH AS READING THE NEWSPAPER OR WATCHING TELEVISION: 2
1. LITTLE INTEREST OR PLEASURE IN DOING THINGS: 2
SUM OF ALL RESPONSES TO PHQ9 QUESTIONS 1 & 2: 4
6. FEELING BAD ABOUT YOURSELF - OR THAT YOU ARE A FAILURE OR HAVE LET YOURSELF OR YOUR FAMILY DOWN: 2
10. IF YOU CHECKED OFF ANY PROBLEMS, HOW DIFFICULT HAVE THESE PROBLEMS MADE IT FOR YOU TO DO YOUR WORK, TAKE CARE OF THINGS AT HOME, OR GET ALONG WITH OTHER PEOPLE: 2
SUM OF ALL RESPONSES TO PHQ QUESTIONS 1-9: 16

## 2023-11-01 ASSESSMENT — COLUMBIA-SUICIDE SEVERITY RATING SCALE - C-SSRS
BASED ON RESPONSES TO C-SSRS QS 1-6, WHAT IS THE PATIENT'S OVERALL RISK RATING FOR SUICIDE: MODERATE RISK
4. HAVE YOU HAD THESE THOUGHTS AND HAD SOME INTENTION OF ACTING ON THEM?: NO
2. HAVE YOU ACTUALLY HAD ANY THOUGHTS OF KILLING YOURSELF?: YES
6. HAVE YOU EVER DONE ANYTHING, STARTED TO DO ANYTHING, OR PREPARED TO DO ANYTHING TO END YOUR LIFE?: NO
7. DID THIS OCCUR IN THE LAST THREE MONTHS: NO
3. HAVE YOU BEEN THINKING ABOUT HOW YOU MIGHT KILL YOURSELF?: YES
5. HAVE YOU STARTED TO WORK OUT OR WORKED OUT THE DETAILS OF HOW TO KILL YOURSELF? DO YOU INTEND TO CARRY OUT THIS PLAN?: NO
1. WITHIN THE PAST MONTH, HAVE YOU WISHED YOU WERE DEAD OR WISHED YOU COULD GO TO SLEEP AND NOT WAKE UP?: NO

## 2023-11-02 LAB
EST. AVERAGE GLUCOSE BLD GHB EST-MCNC: 122.6 MG/DL
HBA1C MFR BLD: 5.9 %

## 2023-11-06 ENCOUNTER — OFFICE VISIT (OUTPATIENT)
Dept: ORTHOPEDIC SURGERY | Age: 48
End: 2023-11-06

## 2023-11-06 VITALS — BODY MASS INDEX: 29.16 KG/M2 | WEIGHT: 252 LBS | HEIGHT: 78 IN

## 2023-11-06 DIAGNOSIS — M75.82 ROTATOR CUFF TENDINITIS, LEFT: Primary | ICD-10-CM

## 2023-11-06 DIAGNOSIS — S46.912A STRAIN OF LEFT SHOULDER, INITIAL ENCOUNTER: ICD-10-CM

## 2023-11-06 RX ORDER — METHYLPREDNISOLONE ACETATE 40 MG/ML
40 INJECTION, SUSPENSION INTRA-ARTICULAR; INTRALESIONAL; INTRAMUSCULAR; SOFT TISSUE ONCE
Status: COMPLETED | OUTPATIENT
Start: 2023-11-06 | End: 2023-11-06

## 2023-11-06 RX ADMIN — METHYLPREDNISOLONE ACETATE 40 MG: 40 INJECTION, SUSPENSION INTRA-ARTICULAR; INTRALESIONAL; INTRAMUSCULAR; SOFT TISSUE at 16:12

## 2023-12-05 ENCOUNTER — OFFICE VISIT (OUTPATIENT)
Dept: PSYCHIATRY | Age: 48
End: 2023-12-05
Payer: COMMERCIAL

## 2023-12-05 VITALS
BODY MASS INDEX: 28.93 KG/M2 | WEIGHT: 250 LBS | OXYGEN SATURATION: 96 % | HEIGHT: 78 IN | SYSTOLIC BLOOD PRESSURE: 124 MMHG | HEART RATE: 74 BPM | DIASTOLIC BLOOD PRESSURE: 74 MMHG | RESPIRATION RATE: 16 BRPM

## 2023-12-05 DIAGNOSIS — F90.2 ATTENTION DEFICIT HYPERACTIVITY DISORDER (ADHD), COMBINED TYPE: ICD-10-CM

## 2023-12-05 DIAGNOSIS — F33.2 SEVERE EPISODE OF RECURRENT MAJOR DEPRESSIVE DISORDER, WITHOUT PSYCHOTIC FEATURES (HCC): Primary | ICD-10-CM

## 2023-12-05 PROCEDURE — 99214 OFFICE O/P EST MOD 30 MIN: CPT | Performed by: PSYCHIATRY & NEUROLOGY

## 2023-12-05 PROCEDURE — G8417 CALC BMI ABV UP PARAM F/U: HCPCS | Performed by: PSYCHIATRY & NEUROLOGY

## 2023-12-05 PROCEDURE — G8484 FLU IMMUNIZE NO ADMIN: HCPCS | Performed by: PSYCHIATRY & NEUROLOGY

## 2023-12-05 PROCEDURE — 1036F TOBACCO NON-USER: CPT | Performed by: PSYCHIATRY & NEUROLOGY

## 2023-12-05 PROCEDURE — G8427 DOCREV CUR MEDS BY ELIG CLIN: HCPCS | Performed by: PSYCHIATRY & NEUROLOGY

## 2023-12-05 RX ORDER — LISDEXAMFETAMINE DIMESYLATE CAPSULES 30 MG/1
30 CAPSULE ORAL DAILY
Qty: 10 CAPSULE | Refills: 0 | Status: SHIPPED | OUTPATIENT
Start: 2023-12-05 | End: 2023-12-15

## 2023-12-05 NOTE — PROGRESS NOTES
PSYCHIATRY PROGRESS NOTE    Tomy Perez : [birth year]  PCP: Guevara Estes MD    Chief Complaint   Patient presents with    ADHD    Depression       S:   Pt is not doing well with his mood, mood seems to be worsening. He is feeling increasingly hopeless, having more suicidal thoughts - some days these are more intense and much more difficult to control. They aren't as present today and there is some hope. Symptoms include poor focus, poor ability to follow through with tasks, difficulty with time management, being easily irritated and agitated. Having a hard time going to sleep, feels the wellbutrin may be making it harder to sleep. Wellbutrin doesn't seem to be doing much for him. Doesn't necessarily feel more focused or better mood with it. He states his MJ use is pretty infrequent at this point. His main stress now is being unemployed, needing to find work, not knowing how he will pay for $3000/month worth of bills. He is applying for jobs. 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 Pt feels the citalopram does help him. Without it he feels he wouldn't be able to cope at all. Days he misses it, which is not often, he can feel a marked difference in his mood and anxiety. Saw Liliana Madrigal once, but didn't go back, he felt a lot worse talking about his problems after the visit and hasn't wanted to return.      ROS: no adverse physical symptoms noted    Current Psychiatric Medications:  Citalopram 40mg daily  Wellbutrin XL 150mg    O:  Vitals:    23 1103   BP: 124/74   Pulse: 74   Resp: 16   SpO2: 96%   Weight - Scale: 113.4 kg (250 lb)         2023    11:36 AM 10/26/2023    11:16 AM 10/2/2023    11:12 AM 2023     2:14 PM 2023    10:37 AM 3/16/2023     2:13 PM 2022     1:09 PM   PHQ Scores   PHQ2 Score 4 4 4 2 4 3 6   PHQ9 Score 16 17 22 13 15 14 22         2023    11:39 AM

## 2023-12-05 NOTE — PATIENT INSTRUCTIONS
Partial Hospitalization Program:  [facility]:   [phone]    You can do any of the following for psychiatric emergencies:   Call the [facility] psychiatric emergency hotline at 513-281-CARE   2. Access the [facility] Emergency Psychiatry Services:     - Go to the [facility] Psychiatric Emergency Services (PES) at [address] [facility] [address] [facility], [address]   - Call the [address] at [phone].    - Call the [facility] Mobile Crisis Team at [phone]   3. Go to any emergency room or call 511  4. Visit or call [facility] at [address]  [facility], [address] [phone].

## 2023-12-14 ENCOUNTER — PATIENT MESSAGE (OUTPATIENT)
Dept: PSYCHIATRY | Age: 48
End: 2023-12-14

## 2023-12-14 DIAGNOSIS — F90.2 ATTENTION DEFICIT HYPERACTIVITY DISORDER (ADHD), COMBINED TYPE: ICD-10-CM

## 2023-12-15 RX ORDER — LISDEXAMFETAMINE DIMESYLATE CAPSULES 30 MG/1
30 CAPSULE ORAL DAILY
Qty: 30 CAPSULE | Refills: 0 | Status: SHIPPED | OUTPATIENT
Start: 2023-12-15 | End: 2024-01-14

## 2024-03-06 ENCOUNTER — OFFICE VISIT (OUTPATIENT)
Dept: INTERNAL MEDICINE CLINIC | Age: 49
End: 2024-03-06
Payer: COMMERCIAL

## 2024-03-06 VITALS
RESPIRATION RATE: 18 BRPM | DIASTOLIC BLOOD PRESSURE: 84 MMHG | HEIGHT: 77 IN | BODY MASS INDEX: 30.7 KG/M2 | HEART RATE: 83 BPM | SYSTOLIC BLOOD PRESSURE: 134 MMHG | OXYGEN SATURATION: 98 % | WEIGHT: 260 LBS | TEMPERATURE: 98.2 F

## 2024-03-06 DIAGNOSIS — J30.89 NON-SEASONAL ALLERGIC RHINITIS, UNSPECIFIED TRIGGER: ICD-10-CM

## 2024-03-06 DIAGNOSIS — J45.50 SEVERE PERSISTENT ASTHMA WITHOUT COMPLICATION: ICD-10-CM

## 2024-03-06 DIAGNOSIS — J45.50 SEVERE PERSISTENT ASTHMA WITHOUT COMPLICATION: Primary | ICD-10-CM

## 2024-03-06 LAB
BASOPHILS # BLD: 0 K/UL (ref 0–0.2)
BASOPHILS NFR BLD: 0.2 %
DEPRECATED RDW RBC AUTO: 12.7 % (ref 12.4–15.4)
EOSINOPHIL # BLD: 0.1 K/UL (ref 0–0.6)
EOSINOPHIL NFR BLD: 1.4 %
HCT VFR BLD AUTO: 40.1 % (ref 40.5–52.5)
HGB BLD-MCNC: 14.2 G/DL (ref 13.5–17.5)
LYMPHOCYTES # BLD: 1.9 K/UL (ref 1–5.1)
LYMPHOCYTES NFR BLD: 48.9 %
MCH RBC QN AUTO: 30.8 PG (ref 26–34)
MCHC RBC AUTO-ENTMCNC: 35.3 G/DL (ref 31–36)
MCV RBC AUTO: 87.1 FL (ref 80–100)
MONOCYTES # BLD: 0.4 K/UL (ref 0–1.3)
MONOCYTES NFR BLD: 10.2 %
NEUTROPHILS # BLD: 1.5 K/UL (ref 1.7–7.7)
NEUTROPHILS NFR BLD: 39.3 %
PLATELET # BLD AUTO: 171 K/UL (ref 135–450)
PMV BLD AUTO: 8.5 FL (ref 5–10.5)
RBC # BLD AUTO: 4.6 M/UL (ref 4.2–5.9)
WBC # BLD AUTO: 3.9 K/UL (ref 4–11)

## 2024-03-06 PROCEDURE — 1036F TOBACCO NON-USER: CPT | Performed by: INTERNAL MEDICINE

## 2024-03-06 PROCEDURE — G8427 DOCREV CUR MEDS BY ELIG CLIN: HCPCS | Performed by: INTERNAL MEDICINE

## 2024-03-06 PROCEDURE — G8417 CALC BMI ABV UP PARAM F/U: HCPCS | Performed by: INTERNAL MEDICINE

## 2024-03-06 PROCEDURE — G8484 FLU IMMUNIZE NO ADMIN: HCPCS | Performed by: INTERNAL MEDICINE

## 2024-03-06 PROCEDURE — 99214 OFFICE O/P EST MOD 30 MIN: CPT | Performed by: INTERNAL MEDICINE

## 2024-03-06 ASSESSMENT — ANXIETY QUESTIONNAIRES
IF YOU CHECKED OFF ANY PROBLEMS ON THIS QUESTIONNAIRE, HOW DIFFICULT HAVE THESE PROBLEMS MADE IT FOR YOU TO DO YOUR WORK, TAKE CARE OF THINGS AT HOME, OR GET ALONG WITH OTHER PEOPLE: NOT DIFFICULT AT ALL
GAD7 TOTAL SCORE: 3
4. TROUBLE RELAXING: 0
2. NOT BEING ABLE TO STOP OR CONTROL WORRYING: 0
6. BECOMING EASILY ANNOYED OR IRRITABLE: 0
1. FEELING NERVOUS, ANXIOUS, OR ON EDGE: 2
3. WORRYING TOO MUCH ABOUT DIFFERENT THINGS: 1
5. BEING SO RESTLESS THAT IT IS HARD TO SIT STILL: 0
7. FEELING AFRAID AS IF SOMETHING AWFUL MIGHT HAPPEN: 0

## 2024-03-06 ASSESSMENT — PATIENT HEALTH QUESTIONNAIRE - PHQ9
SUM OF ALL RESPONSES TO PHQ QUESTIONS 1-9: 4
4. FEELING TIRED OR HAVING LITTLE ENERGY: 1
3. TROUBLE FALLING OR STAYING ASLEEP: 1
9. THOUGHTS THAT YOU WOULD BE BETTER OFF DEAD, OR OF HURTING YOURSELF: 0
6. FEELING BAD ABOUT YOURSELF - OR THAT YOU ARE A FAILURE OR HAVE LET YOURSELF OR YOUR FAMILY DOWN: 0
SUM OF ALL RESPONSES TO PHQ9 QUESTIONS 1 & 2: 0
SUM OF ALL RESPONSES TO PHQ QUESTIONS 1-9: 4
SUM OF ALL RESPONSES TO PHQ QUESTIONS 1-9: 4
1. LITTLE INTEREST OR PLEASURE IN DOING THINGS: 0
10. IF YOU CHECKED OFF ANY PROBLEMS, HOW DIFFICULT HAVE THESE PROBLEMS MADE IT FOR YOU TO DO YOUR WORK, TAKE CARE OF THINGS AT HOME, OR GET ALONG WITH OTHER PEOPLE: 2
SUM OF ALL RESPONSES TO PHQ QUESTIONS 1-9: 4
8. MOVING OR SPEAKING SO SLOWLY THAT OTHER PEOPLE COULD HAVE NOTICED. OR THE OPPOSITE, BEING SO FIGETY OR RESTLESS THAT YOU HAVE BEEN MOVING AROUND A LOT MORE THAN USUAL: 1
2. FEELING DOWN, DEPRESSED OR HOPELESS: 0
7. TROUBLE CONCENTRATING ON THINGS, SUCH AS READING THE NEWSPAPER OR WATCHING TELEVISION: 0
5. POOR APPETITE OR OVEREATING: 1

## 2024-03-06 NOTE — PROGRESS NOTES
Tomy Perez (:  1975) is a 48 y.o. male,Established patient, here for evaluation of the following chief complaint(s):  Dizziness, Sinus Problem, Headache, and Other (Maybe have been Exposed to mold)         ASSESSMENT/PLAN:  1. Severe persistent asthma without complication  -     CBC with Auto Differential; Future  -     PEDIATRIC ALLERGEN PROFILE; Future  -     [facility]  2. Non-seasonal allergic rhinitis, unspecified trigger  -     CBC with Auto Differential; Future  -     PEDIATRIC ALLERGEN PROFILE; Future  -     Navneet English MD, Otolaryngology, [facility]      Return in about 2 months (around 2024) for severe asthma 30 min.         Subjective   SUBJECTIVE/OBJECTIVE:  HPI patient comes in for worsening asthma and sinusitis. He is concerned about some  possible allergens (like mold) in his house.  He would like to see an ENT physician to be evaluated for sinusitis.     Review of Systems       Objective   Vitals:    24 0844   BP: 134/84   Pulse: 83   Resp: 18   Temp: 98.2 °F (36.8 °C)   SpO2: 98%   Weight: 117.9 kg (260 lb)   Height: 1.956 m (6' 5\")      Wt Readings from Last 3 Encounters:   24 117.9 kg (260 lb)   23 113.4 kg (250 lb)   23 114.3 kg (252 lb)     BP Readings from Last 3 Encounters:   24 134/84   23 124/74   23 118/78     Body mass index is 30.83 kg/m². Facility age limit for growth %veronique is 20 years.   Physical Exam  Constitutional:       Appearance: Normal appearance.   HENT:      Nose: Nasal tenderness, mucosal edema, congestion and rhinorrhea present.      Right Turbinates: Not enlarged or swollen.      Left Turbinates: Not enlarged or swollen.   Eyes:      General:         Right eye: No discharge.         Left eye: No discharge.      Extraocular Movements: Extraocular movements intact.      Pupils: Pupils are equal, round, and reactive to light.   Cardiovascular:      Rate and

## 2024-03-08 LAB
A ALTERNATA IGE QN: <0.1 KU/L
CAT DANDER IGE QN: 0.65 KU/L
CODFISH IGE QN: <0.1 KU/L
COW MILK IGE QN: 0.38 KU/L
D FARINAE IGE QN: 0.88 KU/L
D PTERONYSS IGE QN: 0.66 KU/L
DEPRECATED MISC ALLERGEN IGE RAST QL: ABNORMAL
DOG DANDER IGE QN: 0.73 KU/L
EGG WHITE IGE QN: <0.1 KU/L
IGE SERPL-ACNC: 56 KU/L
PEANUT IGE QN: <0.1 KU/L
ROACH IGE QN: <0.1 KU/L
SOYBEAN IGE QN: <0.1 KU/L
WHEAT IGE QN: <0.1 KU/L

## 2024-04-24 DIAGNOSIS — F32.1 MODERATE SINGLE CURRENT EPISODE OF MAJOR DEPRESSIVE DISORDER (HCC): ICD-10-CM

## 2024-04-24 RX ORDER — CITALOPRAM 20 MG/1
40 TABLET ORAL NIGHTLY PRN
Qty: 180 TABLET | OUTPATIENT
Start: 2024-04-24

## 2024-05-10 ENCOUNTER — TELEPHONE (OUTPATIENT)
Dept: INTERNAL MEDICINE CLINIC | Age: 49
End: 2024-05-10

## 2024-05-10 NOTE — TELEPHONE ENCOUNTER
Rose Tavarez, an occupational health nurse from [employer] called on behalf of the patient.    One March 31 st, Dr. Najera filled out a return to work form that has restrictions.     The form dated 04/29/24, states no restrictions, and return to work on 05/07/24    They are now concerned about the safety of the patient, and would like to know the specifics of the patients restrictions. And would like to know if the PCP is aware of the patient's occupation.    Callback: [phone]  Fax: [phone]

## 2024-05-13 NOTE — TELEPHONE ENCOUNTER
Left pt a vm to return the call to the office,   Pt states that he is able to do the job without restrictions. Please have him come in for an appointment to complete the paperwork.

## 2024-05-13 NOTE — TELEPHONE ENCOUNTER
Please states that he is able to do the job without restrictions. Please have him come in for an appointment to complete the paperwork.

## 2024-05-24 ENCOUNTER — TELEPHONE (OUTPATIENT)
Dept: INTERNAL MEDICINE CLINIC | Age: 49
End: 2024-05-24

## 2024-05-24 NOTE — TELEPHONE ENCOUNTER
His job wont let him do position if he has restrictions however he feels like the nurse is trying to catch him in a catch 22 and get him fired. He would like no restrictions for his paperwork.

## 2024-05-24 NOTE — TELEPHONE ENCOUNTER
Please tell patient we need some clarity on his work restrictions.  The patient stated he wanted to return to full duty.  However, the nurse at his office is concerned about his ability to do full duty.  I just need some clarification before I complete the paperwork.

## 2024-05-24 NOTE — TELEPHONE ENCOUNTER
Patient inquired about work restriction paperwork submitted by employer, [employer]. He was unaware of message left (5/13/24) to schedule a follow up appointment. Asks if paperwork has been received and if appointment is still needed.

## 2024-07-17 ENCOUNTER — TELEPHONE (OUTPATIENT)
Dept: PULMONOLOGY | Age: 49
End: 2024-07-17

## 2024-07-17 NOTE — TELEPHONE ENCOUNTER
Pt called stating the pressure on his machine is too high. Pt stated he feels like his lungs are full of air and is having chest pain. Can we pull data to see if an adjustment can be made. Please advise.        PH:[phone]

## 2024-07-22 NOTE — PROGRESS NOTES
Diagnosis: [x] VU (G47.33) [] CSA (G47.31) [] Apnea (G47.30)   Length of Need: [x] 18 Months(supplies) [x] 99 Months(machine) [] Other:   Machine (GINA!): [] Respironics Dream Station   2   Auto [x] ResMed AirSense/Curve Auto 11 with  modem for remote monitoring [] Other:   ** Pt needs to be switched to a bilevel machine due to aerophagia and difficulty breathing against pressure despite pressure adjustment. He has severe sleep apnea and the machine is medically necessary.   []  CPAP () [x] Bilevel ()   Mode: [] Auto [] Spontaneous    Mode: [x] Auto [] Spontaneous        Auto Bipap settings:  IPAP max 25 cmH2O  EPAP min 6 cmH2O  PS 3 cmH2O     Comfort Settings: Ramp Pressure: 4 cmH2O  Ramp time: 15 min  Flex/EPR - 3 full time                                  For ResMed Bileve (TiMax-4 sec   TiMin- 0.2 sec)     Humidifier: [x] Heated ()        [x] Water chamber replacement ()/ 1 per 6 months        Mask:   [] Nasal () /1 per 3 months [x] Full Face () /1 per 3 months   [] Patient choice -Size and fit mask [x] Patient Choice - Size and fit mask   [] Dispense: [] Dispense:   [] Headgear () / 1 per 3 months [x] Headgear () / 1 per 3 months   [] Replacement Nasal Cushion ()/2 per month [x] Interface Replacement ()/1 per month   [] Replacement Nasal Pillows ()/2 per month         Tubing: [x] Heated ()/1 per 3 months    [] Standard ()/1 per 3 months [] Other:           Filters: [x] Non-disposable ()/1 per 6 months     [x] Ultra-Fine, Disposable ()/2 per month        Miscellaneous: [] Chin Strap ()/ 1 per 6 months [] O2 bleed-in:        LPM   [] Oxymetry on CPAP/Bilevel [x]  Other: Modem: ()         Start Order Date: 07/22/24    MEDICAL JUSTIFICATION:  I, the undersigned, certify that the above prescribed supplies are medically necessary for this patient’s wellbeing.  In my opinion, the supplies are both reasonable and necessary in reference

## 2024-07-31 ENCOUNTER — TELEPHONE (OUTPATIENT)
Dept: INTERNAL MEDICINE CLINIC | Age: 49
End: 2024-07-31

## 2024-07-31 DIAGNOSIS — N45.4 TESTICULAR ABSCESS: Primary | ICD-10-CM

## 2024-07-31 RX ORDER — ACETAMINOPHEN AND CODEINE PHOSPHATE 300; 30 MG/1; MG/1
1 TABLET ORAL EVERY 12 HOURS PRN
Qty: 30 TABLET | Refills: 0 | Status: SHIPPED | OUTPATIENT
Start: 2024-07-31 | End: 2024-08-15

## 2024-07-31 NOTE — TELEPHONE ENCOUNTER
Pt reports recent hx of cyst on L testicle. Pt endorses worsening testicular pain over prior 2-3 days.  Groin/scrotum is not red, not swollen that he notices, not warm to touch, but is constantly extremely painful (6 out of 10 at best, worse last night and when waiting to urinate).    Informed patient that office would get back with him as soon as PCP Reinaldo responded. Pt verbalized understanding and is agreeable.

## 2024-07-31 NOTE — TELEPHONE ENCOUNTER
Spoke with PCP Reinaldo who recommended PCP appt, may prescribe something in the meantime. PCP advised pt may try aleve and/or local ice before going to bed.    Patient informed of above, verbalized understanding and agreement. Scheduled appt on Wed 8/1 with PCP.

## 2024-07-31 NOTE — TELEPHONE ENCOUNTER
Patient is on Eliquis so we probably will have to use another medication.  I will send in some Tylenol 3 to his pharmacy.

## 2024-08-16 DIAGNOSIS — F32.1 MODERATE SINGLE CURRENT EPISODE OF MAJOR DEPRESSIVE DISORDER (HCC): ICD-10-CM

## 2024-08-16 DIAGNOSIS — M62.838 MUSCLE SPASM: ICD-10-CM

## 2024-08-16 DIAGNOSIS — S29.012A STRAIN OF LATISSIMUS DORSI MUSCLE, INITIAL ENCOUNTER: ICD-10-CM

## 2024-08-16 DIAGNOSIS — F90.2 ATTENTION DEFICIT HYPERACTIVITY DISORDER (ADHD), COMBINED TYPE: ICD-10-CM

## 2024-08-16 DIAGNOSIS — I26.99 BILATERAL PULMONARY EMBOLISM (HCC): ICD-10-CM

## 2024-08-16 RX ORDER — TIZANIDINE 4 MG/1
4 TABLET ORAL 3 TIMES DAILY PRN
Qty: 30 TABLET | Refills: 0 | Status: SHIPPED | OUTPATIENT
Start: 2024-08-16

## 2024-08-16 NOTE — TELEPHONE ENCOUNTER
Recent Visits  Date Type Provider Dept   03/06/24 Office Visit Emmanuel Najera MD [facility]   11/01/23 Office Visit Emmanuel Najera MD [facility]   10/02/23 Office Visit Nikki Webber DO [facility]   06/21/23 Office Visit Emmanuel Najera MD [facility]   06/16/23 Office Visit Emmanuel Najera MD [facility]   03/16/23 Office Visit Emmanuel Najera MD [facility]   Showing recent visits within past 540 days with a meds authorizing provider and meeting all other requirements  Future Appointments  Date Type Provider Dept   10/31/24 Appointment Emmanuel Najera MD [facility]   Showing future appointments within next 150 days with a meds authorizing provider and meeting all other requirements     3/6/2024     Requested Prescriptions     Pending Prescriptions Disp Refills    tiZANidine (ZANAFLEX) 4 MG tablet 30 tablet 0     Sig: Take 1 tablet by mouth 3 times daily as needed (muscle pain)

## 2024-08-18 RX ORDER — CITALOPRAM 20 MG/1
40 TABLET ORAL NIGHTLY PRN
Qty: 180 TABLET | Refills: 1 | Status: SHIPPED | OUTPATIENT
Start: 2024-08-18

## 2024-08-18 RX ORDER — APIXABAN 5 MG/1
TABLET, FILM COATED ORAL
Qty: 180 TABLET | Refills: 1 | Status: SHIPPED | OUTPATIENT
Start: 2024-08-18

## 2024-08-19 RX ORDER — LISDEXAMFETAMINE DIMESYLATE 30 MG/1
30 CAPSULE ORAL DAILY
Qty: 30 CAPSULE | Refills: 0 | OUTPATIENT
Start: 2024-08-19 | End: 2024-09-18

## 2024-08-28 ENCOUNTER — OFFICE VISIT (OUTPATIENT)
Dept: INTERNAL MEDICINE CLINIC | Age: 49
End: 2024-08-28
Payer: COMMERCIAL

## 2024-08-28 VITALS
HEART RATE: 61 BPM | DIASTOLIC BLOOD PRESSURE: 66 MMHG | WEIGHT: 253 LBS | OXYGEN SATURATION: 94 % | BODY MASS INDEX: 30 KG/M2 | SYSTOLIC BLOOD PRESSURE: 124 MMHG

## 2024-08-28 DIAGNOSIS — K59.01 SLOW TRANSIT CONSTIPATION: Primary | ICD-10-CM

## 2024-08-28 PROCEDURE — 99213 OFFICE O/P EST LOW 20 MIN: CPT | Performed by: NURSE PRACTITIONER

## 2024-08-28 RX ORDER — POLYETHYLENE GLYCOL 3350 17 G/17G
17 POWDER, FOR SOLUTION ORAL DAILY
Qty: 578 G | Refills: 0 | Status: SHIPPED | OUTPATIENT
Start: 2024-08-28 | End: 2024-09-27

## 2024-08-28 SDOH — ECONOMIC STABILITY: FOOD INSECURITY: WITHIN THE PAST 12 MONTHS, YOU WORRIED THAT YOUR FOOD WOULD RUN OUT BEFORE YOU GOT MONEY TO BUY MORE.: NEVER TRUE

## 2024-08-28 SDOH — ECONOMIC STABILITY: FOOD INSECURITY: WITHIN THE PAST 12 MONTHS, THE FOOD YOU BOUGHT JUST DIDN'T LAST AND YOU DIDN'T HAVE MONEY TO GET MORE.: NEVER TRUE

## 2024-08-28 SDOH — ECONOMIC STABILITY: INCOME INSECURITY: HOW HARD IS IT FOR YOU TO PAY FOR THE VERY BASICS LIKE FOOD, HOUSING, MEDICAL CARE, AND HEATING?: NOT HARD AT ALL

## 2024-08-28 ASSESSMENT — ENCOUNTER SYMPTOMS
ALLERGIC/IMMUNOLOGIC NEGATIVE: 1
EYES NEGATIVE: 1
RESPIRATORY NEGATIVE: 1
BLOOD IN STOOL: 1

## 2024-08-28 ASSESSMENT — PATIENT HEALTH QUESTIONNAIRE - PHQ9
2. FEELING DOWN, DEPRESSED OR HOPELESS: NOT AT ALL
4. FEELING TIRED OR HAVING LITTLE ENERGY: NEARLY EVERY DAY
SUM OF ALL RESPONSES TO PHQ QUESTIONS 1-9: 13
3. TROUBLE FALLING OR STAYING ASLEEP: NEARLY EVERY DAY
5. POOR APPETITE OR OVEREATING: NEARLY EVERY DAY
9. THOUGHTS THAT YOU WOULD BE BETTER OFF DEAD, OR OF HURTING YOURSELF: NOT AT ALL
SUM OF ALL RESPONSES TO PHQ QUESTIONS 1-9: 13
SUM OF ALL RESPONSES TO PHQ QUESTIONS 1-9: 13
7. TROUBLE CONCENTRATING ON THINGS, SUCH AS READING THE NEWSPAPER OR WATCHING TELEVISION: SEVERAL DAYS
8. MOVING OR SPEAKING SO SLOWLY THAT OTHER PEOPLE COULD HAVE NOTICED. OR THE OPPOSITE, BEING SO FIGETY OR RESTLESS THAT YOU HAVE BEEN MOVING AROUND A LOT MORE THAN USUAL: SEVERAL DAYS
SUM OF ALL RESPONSES TO PHQ9 QUESTIONS 1 & 2: 1
SUM OF ALL RESPONSES TO PHQ QUESTIONS 1-9: 13
6. FEELING BAD ABOUT YOURSELF - OR THAT YOU ARE A FAILURE OR HAVE LET YOURSELF OR YOUR FAMILY DOWN: SEVERAL DAYS
1. LITTLE INTEREST OR PLEASURE IN DOING THINGS: SEVERAL DAYS

## 2024-08-28 NOTE — PROGRESS NOTES
Tomy Perez (:  1975) is a 49 y.o. male,Established patient, here for evaluation of the following chief complaint(s):  Rectal Bleeding (X 1 week)         Assessment & Plan  Slow transit constipation       Orders:    polyethylene glycol (GLYCOLAX) 17 GM/SCOOP powder; Take 17 g by mouth daily      No follow-ups on file.       Subjective   HPI Presents today for complaints of blood in stool for 1.5-2 weeks. Lack of appetite, negative for abdominal pain    Review of Systems   Constitutional: Negative.    HENT: Negative.     Eyes: Negative.    Respiratory: Negative.     Cardiovascular: Negative.    Gastrointestinal:  Positive for blood in stool.   Endocrine: Negative.    Genitourinary: Negative.    Musculoskeletal: Negative.    Skin: Negative.    Allergic/Immunologic: Negative.    Neurological: Negative.    Hematological: Negative.    Psychiatric/Behavioral: Negative.       Vitals:    24 1702   BP: 124/66   Pulse: 61   SpO2: 94%      BP Readings from Last 3 Encounters:   24 124/66   24 110/70   24 134/84      Wt Readings from Last 3 Encounters:   24 114.8 kg (253 lb)   24 117.5 kg (259 lb)   24 117.9 kg (260 lb)           Objective   Physical Exam  Constitutional:       Appearance: Normal appearance.   Cardiovascular:      Rate and Rhythm: Normal rate and regular rhythm.   Pulmonary:      Effort: Pulmonary effort is normal.      Breath sounds: Normal breath sounds.   Abdominal:      General: Abdomen is protuberant. Bowel sounds are normal.      Hernia: No hernia is present. There is no hernia in the umbilical area, ventral area, left inguinal area, right femoral area, left femoral area or right inguinal area.   Skin:     General: Skin is warm and dry.   Neurological:      Mental Status: He is alert.                  An electronic signature was used to authenticate this note.    --Darren Harrison, RUPERT - CNP

## 2024-09-17 ENCOUNTER — PATIENT MESSAGE (OUTPATIENT)
Dept: INTERNAL MEDICINE CLINIC | Age: 49
End: 2024-09-17

## 2024-09-17 DIAGNOSIS — F90.2 ATTENTION DEFICIT HYPERACTIVITY DISORDER (ADHD), COMBINED TYPE: ICD-10-CM

## 2024-09-17 DIAGNOSIS — M62.838 MUSCLE SPASM: ICD-10-CM

## 2024-09-17 DIAGNOSIS — S29.012A STRAIN OF LATISSIMUS DORSI MUSCLE, INITIAL ENCOUNTER: ICD-10-CM

## 2024-09-17 RX ORDER — LISDEXAMFETAMINE DIMESYLATE 30 MG/1
30 CAPSULE ORAL DAILY
Qty: 30 CAPSULE | Refills: 0 | OUTPATIENT
Start: 2024-09-17 | End: 2024-10-17

## 2024-10-08 ENCOUNTER — OFFICE VISIT (OUTPATIENT)
Dept: PSYCHIATRY | Age: 49
End: 2024-10-08

## 2024-10-08 DIAGNOSIS — F32.1 MODERATE SINGLE CURRENT EPISODE OF MAJOR DEPRESSIVE DISORDER (HCC): Primary | ICD-10-CM

## 2024-10-08 DIAGNOSIS — F90.2 ATTENTION DEFICIT HYPERACTIVITY DISORDER (ADHD), COMBINED TYPE: ICD-10-CM

## 2024-10-08 PROCEDURE — 99214 OFFICE O/P EST MOD 30 MIN: CPT | Performed by: PSYCHIATRY & NEUROLOGY

## 2024-10-08 RX ORDER — ATOMOXETINE 80 MG/1
80 CAPSULE ORAL DAILY
Qty: 30 CAPSULE | Refills: 1 | Status: SHIPPED | OUTPATIENT
Start: 2024-10-08

## 2024-10-08 RX ORDER — ATOMOXETINE 40 MG/1
40 CAPSULE ORAL DAILY
Qty: 14 CAPSULE | Refills: 0 | Status: SHIPPED | OUTPATIENT
Start: 2024-10-08 | End: 2024-11-07

## 2024-10-08 ASSESSMENT — ANXIETY QUESTIONNAIRES
6. BECOMING EASILY ANNOYED OR IRRITABLE: SEVERAL DAYS
7. FEELING AFRAID AS IF SOMETHING AWFUL MIGHT HAPPEN: NEARLY EVERY DAY
2. NOT BEING ABLE TO STOP OR CONTROL WORRYING: SEVERAL DAYS
IF YOU CHECKED OFF ANY PROBLEMS ON THIS QUESTIONNAIRE, HOW DIFFICULT HAVE THESE PROBLEMS MADE IT FOR YOU TO DO YOUR WORK, TAKE CARE OF THINGS AT HOME, OR GET ALONG WITH OTHER PEOPLE: VERY DIFFICULT
5. BEING SO RESTLESS THAT IT IS HARD TO SIT STILL: NEARLY EVERY DAY
3. WORRYING TOO MUCH ABOUT DIFFERENT THINGS: SEVERAL DAYS
1. FEELING NERVOUS, ANXIOUS, OR ON EDGE: MORE THAN HALF THE DAYS
GAD7 TOTAL SCORE: 14
4. TROUBLE RELAXING: NEARLY EVERY DAY

## 2024-10-08 ASSESSMENT — PATIENT HEALTH QUESTIONNAIRE - PHQ9
SUM OF ALL RESPONSES TO PHQ QUESTIONS 1-9: 19
4. FEELING TIRED OR HAVING LITTLE ENERGY: NEARLY EVERY DAY
10. IF YOU CHECKED OFF ANY PROBLEMS, HOW DIFFICULT HAVE THESE PROBLEMS MADE IT FOR YOU TO DO YOUR WORK, TAKE CARE OF THINGS AT HOME, OR GET ALONG WITH OTHER PEOPLE: VERY DIFFICULT
SUM OF ALL RESPONSES TO PHQ9 QUESTIONS 1 & 2: 4
6. FEELING BAD ABOUT YOURSELF - OR THAT YOU ARE A FAILURE OR HAVE LET YOURSELF OR YOUR FAMILY DOWN: MORE THAN HALF THE DAYS
9. THOUGHTS THAT YOU WOULD BE BETTER OFF DEAD, OR OF HURTING YOURSELF: NOT AT ALL
3. TROUBLE FALLING OR STAYING ASLEEP: NEARLY EVERY DAY
SUM OF ALL RESPONSES TO PHQ QUESTIONS 1-9: 19
1. LITTLE INTEREST OR PLEASURE IN DOING THINGS: NEARLY EVERY DAY
SUM OF ALL RESPONSES TO PHQ QUESTIONS 1-9: 19
SUM OF ALL RESPONSES TO PHQ QUESTIONS 1-9: 19
7. TROUBLE CONCENTRATING ON THINGS, SUCH AS READING THE NEWSPAPER OR WATCHING TELEVISION: SEVERAL DAYS
5. POOR APPETITE OR OVEREATING: NEARLY EVERY DAY
8. MOVING OR SPEAKING SO SLOWLY THAT OTHER PEOPLE COULD HAVE NOTICED. OR THE OPPOSITE, BEING SO FIGETY OR RESTLESS THAT YOU HAVE BEEN MOVING AROUND A LOT MORE THAN USUAL: NEARLY EVERY DAY
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
## 2024-10-17 ENCOUNTER — TELEPHONE (OUTPATIENT)
Dept: PULMONOLOGY | Age: 49
End: 2024-10-17

## 2024-10-17 NOTE — TELEPHONE ENCOUNTER
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 Pt will like report sent to email at [email or web address]. Please notify pt when sent.      PH:[phone]

## 2024-11-10 DIAGNOSIS — G25.81 RLS (RESTLESS LEGS SYNDROME): ICD-10-CM

## 2024-11-11 RX ORDER — ATOMOXETINE 80 MG/1
80 CAPSULE ORAL DAILY
Qty: 90 CAPSULE | Refills: 0 | Status: SHIPPED | OUTPATIENT
Start: 2024-11-11

## 2024-11-11 RX ORDER — PRAMIPEXOLE DIHYDROCHLORIDE 0.25 MG/1
TABLET ORAL
Qty: 270 TABLET | Refills: 1 | Status: SHIPPED | OUTPATIENT
Start: 2024-11-11

## 2024-11-11 NOTE — TELEPHONE ENCOUNTER
Welcome back !!      Recent Visits  Date Type Provider Dept   10/08/24 Office Visit Hieu Fowler MD [facility]   08/28/24 Office Visit Darren Harrison APRN - CNP [facility]   03/06/24 Office Visit Emmanuel Najera MD [facility]   12/05/23 Office Visit Hieu Fowler MD [facility]   11/01/23 Office Visit Emmanuel Najera MD [facility]   10/26/23 Office Visit Hieu Fowler MD [facility]   10/02/23 Office Visit Nikki Webber DO [facility]   06/21/23 Office Visit Emmanuel Najera MD [facility]   06/16/23 Office Visit Emmanuel Najera MD [facility]   Showing recent visits within past 540 days with a meds authorizing provider and meeting all other requirements  Future Appointments  Date Type Provider Dept   11/12/24 Appointment Hieu Fowler MD [facility]   Showing future appointments within next 150 days with a meds authorizing provider and meeting all other requirements     10/8/2024

## 2024-11-18 ENCOUNTER — TELEPHONE (OUTPATIENT)
Dept: INTERNAL MEDICINE CLINIC | Age: 49
End: 2024-11-18

## 2024-11-18 NOTE — TELEPHONE ENCOUNTER
----- Message from Juan DIA sent at 11/15/2024  3:52 PM EST -----  Regarding: [facility] Referral Request  [facility] Referral Request    Reason for referral request: Specialty Provider    Specialist/Lab/Test patient is requesting (if known): Urologist    Specialist Phone Number (if applicable):    Additional Information Patient called it to make an appointment with urologist  but the patient doesn't even know how to that is why I suggested to make a referral first  --------------------------------------------------------------------------------------------------------------------------    Relationship to Patient: Self     Call Back Information: Do not leave any message, patient will call back for answer  Preferred Call Back Number: Phone [phone]     And it is okay to received a text messages

## 2024-11-18 NOTE — TELEPHONE ENCOUNTER
Please ask why he would like to be referred to urology. We need to add a diagnosis to the referral.

## 2024-11-18 NOTE — TELEPHONE ENCOUNTER
Left pt a vm to return the call to the office,   Please ask why he would like to be referred to urology. We need to add a diagnosis to the referral.

## 2024-11-19 ENCOUNTER — TELEPHONE (OUTPATIENT)
Dept: INTERNAL MEDICINE CLINIC | Age: 49
End: 2024-11-19

## 2024-11-19 DIAGNOSIS — N50.811 RIGHT TESTICULAR PAIN: Primary | ICD-10-CM

## 2024-11-19 NOTE — TELEPHONE ENCOUNTER
Patient was transferred as a Red Flag for testicle pain and groin pain for awhile. Patient was offered an appointment for tomorrow with CNP however he would prefer a referral to Urology instead.

## 2024-11-20 NOTE — TELEPHONE ENCOUNTER
The patient is having severe pain on his testicle, he should go to the emergency department to be evaluated.  I will place a referral though for him to see urology.  We will refer him to Dr. Triana.

## 2024-11-20 NOTE — TELEPHONE ENCOUNTER
Advised pt if he is having severe testicle pain he should go to the ER, also a referral has been placed in his chart also pt has been given the referral information

## 2025-01-01 DIAGNOSIS — S29.012A STRAIN OF LATISSIMUS DORSI MUSCLE, INITIAL ENCOUNTER: ICD-10-CM

## 2025-01-01 DIAGNOSIS — M62.838 MUSCLE SPASM: ICD-10-CM

## 2025-01-02 NOTE — TELEPHONE ENCOUNTER
Recent Visits  Date Type Provider Dept   08/28/24 Office Visit Darren Harrison, APRN - CNP [facility]   03/06/24 Office Visit Emmanuel Najera MD [facility]   11/01/23 Office Visit Emmanuel Najera MD [facility]   10/02/23 Office Visit Nikki Webber DO [facility]   Showing recent visits within past 540 days with a meds authorizing provider and meeting all other requirements  Future Appointments  No visits were found meeting these conditions.  Showing future appointments within next 150 days with a meds authorizing provider and meeting all other requirements     8/28/2024

## 2025-01-15 ENCOUNTER — APPOINTMENT (OUTPATIENT)
Dept: GENERAL RADIOLOGY | Age: 50
End: 2025-01-15
Payer: OTHER MISCELLANEOUS

## 2025-01-15 ENCOUNTER — APPOINTMENT (OUTPATIENT)
Dept: CT IMAGING | Age: 50
End: 2025-01-15
Payer: OTHER MISCELLANEOUS

## 2025-01-15 ENCOUNTER — HOSPITAL ENCOUNTER (EMERGENCY)
Age: 50
Discharge: HOME | End: 2025-01-15
Attending: EMERGENCY MEDICINE
Payer: OTHER MISCELLANEOUS

## 2025-01-15 VITALS
SYSTOLIC BLOOD PRESSURE: 181 MMHG | DIASTOLIC BLOOD PRESSURE: 112 MMHG | OXYGEN SATURATION: 95 % | RESPIRATION RATE: 16 BRPM | BODY MASS INDEX: 30.64 KG/M2 | TEMPERATURE: 98.5 F | WEIGHT: 259.5 LBS | HEART RATE: 83 BPM | HEIGHT: 77 IN

## 2025-01-15 DIAGNOSIS — S20.212A CONTUSION OF RIB ON LEFT SIDE, INITIAL ENCOUNTER: ICD-10-CM

## 2025-01-15 DIAGNOSIS — S16.1XXA STRAIN OF NECK MUSCLE, INITIAL ENCOUNTER: Primary | ICD-10-CM

## 2025-01-15 DIAGNOSIS — S70.02XA CONTUSION OF LEFT HIP, INITIAL ENCOUNTER: ICD-10-CM

## 2025-01-15 DIAGNOSIS — S80.00XA CONTUSION OF KNEE, UNSPECIFIED LATERALITY, INITIAL ENCOUNTER: ICD-10-CM

## 2025-01-15 PROCEDURE — 6370000000 HC RX 637 (ALT 250 FOR IP): Performed by: EMERGENCY MEDICINE

## 2025-01-15 PROCEDURE — 73562 X-RAY EXAM OF KNEE 3: CPT

## 2025-01-15 PROCEDURE — 99284 EMERGENCY DEPT VISIT MOD MDM: CPT

## 2025-01-15 PROCEDURE — 73502 X-RAY EXAM HIP UNI 2-3 VIEWS: CPT

## 2025-01-15 PROCEDURE — 72125 CT NECK SPINE W/O DYE: CPT

## 2025-01-15 PROCEDURE — 71101 X-RAY EXAM UNILAT RIBS/CHEST: CPT

## 2025-01-15 RX ORDER — NAPROXEN 250 MG/1
500 TABLET ORAL ONCE
Status: COMPLETED | OUTPATIENT
Start: 2025-01-15 | End: 2025-01-15

## 2025-01-15 RX ORDER — NAPROXEN 500 MG/1
500 TABLET ORAL 2 TIMES DAILY
Qty: 20 TABLET | Refills: 0 | Status: SHIPPED | OUTPATIENT
Start: 2025-01-15 | End: 2025-01-25

## 2025-01-15 RX ORDER — LIDOCAINE 50 MG/G
1 PATCH TOPICAL DAILY
Qty: 30 PATCH | Refills: 0 | Status: SHIPPED | OUTPATIENT
Start: 2025-01-15

## 2025-01-15 RX ORDER — CYCLOBENZAPRINE HCL 10 MG
10 TABLET ORAL 3 TIMES DAILY PRN
Qty: 15 TABLET | Refills: 0 | Status: SHIPPED | OUTPATIENT
Start: 2025-01-15 | End: 2025-01-25

## 2025-01-15 RX ADMIN — NAPROXEN 500 MG: 250 TABLET ORAL at 21:42

## 2025-01-15 ASSESSMENT — LIFESTYLE VARIABLES
HOW OFTEN DO YOU HAVE A DRINK CONTAINING ALCOHOL: NEVER
HOW MANY STANDARD DRINKS CONTAINING ALCOHOL DO YOU HAVE ON A TYPICAL DAY: PATIENT DOES NOT DRINK

## 2025-01-15 ASSESSMENT — PAIN DESCRIPTION - LOCATION
LOCATION: SHOULDER
LOCATION: KNEE
LOCATION: SHOULDER

## 2025-01-15 ASSESSMENT — PAIN SCALES - GENERAL
PAINLEVEL_OUTOF10: 5
PAINLEVEL_OUTOF10: 7

## 2025-01-15 ASSESSMENT — PAIN DESCRIPTION - DESCRIPTORS: DESCRIPTORS: ACHING

## 2025-01-15 ASSESSMENT — PAIN DESCRIPTION - ORIENTATION
ORIENTATION: LEFT;RIGHT
ORIENTATION: LEFT

## 2025-01-15 ASSESSMENT — PAIN - FUNCTIONAL ASSESSMENT: PAIN_FUNCTIONAL_ASSESSMENT: 0-10
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
## 2025-01-16 NOTE — DISCHARGE INSTRUCTIONS
Discharge home  Ice to the affected areas  Naprosyn  Flexeril  Lidoderm patch  Follow-up with your family doctor   No

## 2025-02-03 ENCOUNTER — OFFICE VISIT (OUTPATIENT)
Dept: INTERNAL MEDICINE CLINIC | Age: 50
End: 2025-02-03
Payer: MEDICAID

## 2025-02-03 VITALS
TEMPERATURE: 97.9 F | RESPIRATION RATE: 18 BRPM | HEART RATE: 81 BPM | WEIGHT: 266.6 LBS | SYSTOLIC BLOOD PRESSURE: 124 MMHG | OXYGEN SATURATION: 98 % | BODY MASS INDEX: 31.48 KG/M2 | DIASTOLIC BLOOD PRESSURE: 86 MMHG | HEIGHT: 77 IN

## 2025-02-03 DIAGNOSIS — M48.02 CERVICAL STENOSIS OF SPINAL CANAL: ICD-10-CM

## 2025-02-03 DIAGNOSIS — M47.812 CERVICAL SPONDYLOSIS: Primary | ICD-10-CM

## 2025-02-03 PROCEDURE — 99213 OFFICE O/P EST LOW 20 MIN: CPT | Performed by: INTERNAL MEDICINE

## 2025-02-03 RX ORDER — DICLOFENAC SODIUM 75 MG/1
75 TABLET, DELAYED RELEASE ORAL 2 TIMES DAILY
Qty: 60 TABLET | Refills: 3 | Status: SHIPPED | OUTPATIENT
Start: 2025-02-03

## 2025-02-03 SDOH — ECONOMIC STABILITY: FOOD INSECURITY: WITHIN THE PAST 12 MONTHS, YOU WORRIED THAT YOUR FOOD WOULD RUN OUT BEFORE YOU GOT MONEY TO BUY MORE.: OFTEN TRUE

## 2025-02-03 SDOH — ECONOMIC STABILITY: FOOD INSECURITY: WITHIN THE PAST 12 MONTHS, THE FOOD YOU BOUGHT JUST DIDN'T LAST AND YOU DIDN'T HAVE MONEY TO GET MORE.: OFTEN TRUE

## 2025-02-03 ASSESSMENT — PATIENT HEALTH QUESTIONNAIRE - PHQ9
SUM OF ALL RESPONSES TO PHQ QUESTIONS 1-9: 10
4. FEELING TIRED OR HAVING LITTLE ENERGY: NEARLY EVERY DAY
5. POOR APPETITE OR OVEREATING: SEVERAL DAYS
SUM OF ALL RESPONSES TO PHQ QUESTIONS 1-9: 10
SUM OF ALL RESPONSES TO PHQ QUESTIONS 1-9: 10
2. FEELING DOWN, DEPRESSED OR HOPELESS: NOT AT ALL
SUM OF ALL RESPONSES TO PHQ9 QUESTIONS 1 & 2: 1
9. THOUGHTS THAT YOU WOULD BE BETTER OFF DEAD, OR OF HURTING YOURSELF: NOT AT ALL
6. FEELING BAD ABOUT YOURSELF - OR THAT YOU ARE A FAILURE OR HAVE LET YOURSELF OR YOUR FAMILY DOWN: SEVERAL DAYS
8. MOVING OR SPEAKING SO SLOWLY THAT OTHER PEOPLE COULD HAVE NOTICED. OR THE OPPOSITE, BEING SO FIGETY OR RESTLESS THAT YOU HAVE BEEN MOVING AROUND A LOT MORE THAN USUAL: SEVERAL DAYS
3. TROUBLE FALLING OR STAYING ASLEEP: MORE THAN HALF THE DAYS
SUM OF ALL RESPONSES TO PHQ QUESTIONS 1-9: 10
7. TROUBLE CONCENTRATING ON THINGS, SUCH AS READING THE NEWSPAPER OR WATCHING TELEVISION: SEVERAL DAYS
10. IF YOU CHECKED OFF ANY PROBLEMS, HOW DIFFICULT HAVE THESE PROBLEMS MADE IT FOR YOU TO DO YOUR WORK, TAKE CARE OF THINGS AT HOME, OR GET ALONG WITH OTHER PEOPLE: SOMEWHAT DIFFICULT
1. LITTLE INTEREST OR PLEASURE IN DOING THINGS: SEVERAL DAYS

## 2025-02-03 ASSESSMENT — ANXIETY QUESTIONNAIRES
5. BEING SO RESTLESS THAT IT IS HARD TO SIT STILL: SEVERAL DAYS
IF YOU CHECKED OFF ANY PROBLEMS ON THIS QUESTIONNAIRE, HOW DIFFICULT HAVE THESE PROBLEMS MADE IT FOR YOU TO DO YOUR WORK, TAKE CARE OF THINGS AT HOME, OR GET ALONG WITH OTHER PEOPLE: SOMEWHAT DIFFICULT
6. BECOMING EASILY ANNOYED OR IRRITABLE: SEVERAL DAYS
1. FEELING NERVOUS, ANXIOUS, OR ON EDGE: NOT AT ALL
3. WORRYING TOO MUCH ABOUT DIFFERENT THINGS: SEVERAL DAYS
4. TROUBLE RELAXING: MORE THAN HALF THE DAYS
GAD7 TOTAL SCORE: 7
2. NOT BEING ABLE TO STOP OR CONTROL WORRYING: SEVERAL DAYS
7. FEELING AFRAID AS IF SOMETHING AWFUL MIGHT HAPPEN: SEVERAL DAYS
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
## 2025-02-09 NOTE — TELEPHONE ENCOUNTER
Recent Visits  Date Type Provider Dept   02/03/25 Office Visit Emmanuel Najera MD [facility]   10/08/24 Office Visit Hieu Fowler MD [facility]   08/28/24 Office Visit Darren Harrison, APRN - CNP [facility]   03/06/24 Office Visit Emmanuel Najera MD [facility]   12/05/23 Office Visit Hieu Fowler MD [facility]   11/01/23 Office Visit Emmanuel Najera MD [facility]   10/26/23 Office Visit Hieu Fowler MD [facility]   10/02/23 Office Visit Nikki Webber DO [facility]   Showing recent visits within past 540 days with a meds authorizing provider and meeting all other requirements  Future Appointments  Date Type Provider Dept   05/07/25 Appointment Emmanuel Najera MD [facility]   Showing future appointments within next 150 days with a meds authorizing provider and meeting all other requirements     10/8/2024

## 2025-02-10 RX ORDER — ATOMOXETINE 80 MG/1
80 CAPSULE ORAL DAILY
Qty: 90 CAPSULE | Refills: 0 | Status: SHIPPED | OUTPATIENT
Start: 2025-02-10

## 2025-02-13 ENCOUNTER — OFFICE VISIT (OUTPATIENT)
Dept: ORTHOPEDIC SURGERY | Age: 50
End: 2025-02-13
Payer: COMMERCIAL

## 2025-02-13 VITALS — BODY MASS INDEX: 31.41 KG/M2 | WEIGHT: 266 LBS | HEIGHT: 77 IN

## 2025-02-13 DIAGNOSIS — S16.1XXA CERVICAL STRAIN, ACUTE, INITIAL ENCOUNTER: ICD-10-CM

## 2025-02-13 DIAGNOSIS — M48.02 CERVICAL STENOSIS OF SPINAL CANAL: Primary | ICD-10-CM

## 2025-02-13 DIAGNOSIS — M50.30 DDD (DEGENERATIVE DISC DISEASE), CERVICAL: ICD-10-CM

## 2025-02-13 PROCEDURE — 99214 OFFICE O/P EST MOD 30 MIN: CPT | Performed by: PHYSICIAN ASSISTANT

## 2025-02-13 NOTE — PROGRESS NOTES
Disp: 30 tablet, Rfl: 5    Elastic Bandages & Supports (ACE ANKLE BRACE LARGE) MISC, Please wear brace on Left ankle while awake. (Patient not taking: Reported on 8/28/2024), Disp: 1 each, Rfl: 0    lactobacillus (CULTURELLE) CAPS capsule, Take 1 capsule by mouth daily (Patient not taking: Reported on 8/28/2024), Disp: 30 capsule, Rfl: 3    albuterol sulfate HFA (PROVENTIL HFA) 108 (90 Base) MCG/ACT inhaler, Inhale 2 puffs into the lungs every 4 hours as needed for Wheezing or Shortness of Breath, Disp: 1 Inhaler, Rfl: 11    Allergies:  Patient has no known allergies.    Social History:    reports that he quit smoking about 9 years ago. His smoking use included cigarettes. He started smoking about 36 years ago. He has a 13.3 pack-year smoking history. He has never used smokeless tobacco. He reports current alcohol use of about 2.0 standard drinks of alcohol per week. He reports that he does not use drugs.    Family History:   Family History   Problem Relation Age of Onset    Diabetes Mother     Diabetes Sister     Clotting Disorder Maternal Grandfather     Diabetes Sister     Clotting Disorder Father        REVIEW OF SYSTEMS: Full ROS noted & scanned   CONSTITUTIONAL: Denies unexplained weight loss, fevers, chills or fatigue  NEUROLOGICAL: Denies unsteady gait or progressive weakness  MUSCULOSKELETAL: Denies joint swelling or redness  PSYCHOLOGICAL: Patient has a history of generalized anxiety disorder  SKIN: Denies skin changes, delayed healing, rash, itching   HEMATOLOGIC: Denies easy bleeding or bruising  ENDOCRINE: Denies excessive thirst, urination, heat/cold  RESPIRATORY: Denies current dyspnea, cough  GI: Denies nausea, vomiting, diarrhea   : Denies bowel or bladder issues       PHYSICAL EXAM:    Vitals: Height 1.956 m (6' 5.01\"), weight 120.7 kg (266 lb).    GENERAL EXAM:  General Apparence: Patient is adequately groomed with no evidence of malnutrition.  Orientation: The patient is oriented to time, place

## 2025-02-14 ENCOUNTER — TELEPHONE (OUTPATIENT)
Dept: ORTHOPEDIC SURGERY | Age: 50
End: 2025-02-14

## 2025-02-14 NOTE — TELEPHONE ENCOUNTER
General Question     Subject: PATIENT IS HAVING THE MAJORITY OF THE PAIN IS ON THE RIGHT SIDE.  Patient and /or Facility Request: Tomy Perez   Contact Number: [phone]

## 2025-03-05 ENCOUNTER — TELEPHONE (OUTPATIENT)
Dept: INTERNAL MEDICINE CLINIC | Age: 50
End: 2025-03-05

## 2025-03-05 NOTE — TELEPHONE ENCOUNTER
Pt called needing a new referral for a follow up appointment with Dr. Issac Juan PA-C for his neck pain.

## 2025-03-06 NOTE — TELEPHONE ENCOUNTER
Left message to call office at [phone] to schedule an appointment, or use [employer] to request with preferred time/date.

## 2025-03-07 DIAGNOSIS — F32.1 MODERATE SINGLE CURRENT EPISODE OF MAJOR DEPRESSIVE DISORDER (HCC): ICD-10-CM

## 2025-03-07 RX ORDER — CITALOPRAM HYDROBROMIDE 20 MG/1
40 TABLET ORAL NIGHTLY PRN
Qty: 180 TABLET | Refills: 1 | Status: SHIPPED | OUTPATIENT
Start: 2025-03-07

## 2025-03-24 ENCOUNTER — OFFICE VISIT (OUTPATIENT)
Dept: ORTHOPEDIC SURGERY | Age: 50
End: 2025-03-24
Payer: COMMERCIAL

## 2025-03-24 VITALS — BODY MASS INDEX: 31.41 KG/M2 | HEIGHT: 77 IN | WEIGHT: 266 LBS

## 2025-03-24 DIAGNOSIS — M50.30 DDD (DEGENERATIVE DISC DISEASE), CERVICAL: ICD-10-CM

## 2025-03-24 DIAGNOSIS — M48.02 CERVICAL STENOSIS OF SPINAL CANAL: Primary | ICD-10-CM

## 2025-03-24 DIAGNOSIS — M48.02 FORAMINAL STENOSIS OF CERVICAL REGION: ICD-10-CM

## 2025-03-24 DIAGNOSIS — S16.1XXA CERVICAL STRAIN, ACUTE, INITIAL ENCOUNTER: ICD-10-CM

## 2025-03-24 PROCEDURE — 99214 OFFICE O/P EST MOD 30 MIN: CPT | Performed by: PHYSICIAN ASSISTANT
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
## 2025-04-11 ENCOUNTER — TELEPHONE (OUTPATIENT)
Dept: ORTHOPEDIC SURGERY | Age: 50
End: 2025-04-11

## 2025-04-11 NOTE — TELEPHONE ENCOUNTER
----- Message from Valentina LU sent at 4/11/2025  8:30 AM EDT -----  Specialty Message to Provider    Relationship to Patient: Self     Patient Message: REFERRAL FOR CERVICAL NECK  --------------------------------------------------------------------------------------------------------------------------    Call Back Information: OK to leave message on voicemail  Preferred Call Back Number: Phone     PATIENT CALLING REGARDING SURJIT GAVE HIM A REFERRAL FOR HIS CERVICAL NECK TO SEE ANOTHER DOCTOR.    PATIENT HAS MISPLACED THE NAME OF THE DOCTOR.     PATIENT REQUESTING A CALL BACK WITH THE NAME AND THE PHONE NUMBER OF THE DOCTOR HE WAS REFERRED TO.    PLEASE CALL BACK AT THE ABOVE NUMBER.

## 2025-04-14 DIAGNOSIS — G25.81 RLS (RESTLESS LEGS SYNDROME): ICD-10-CM

## 2025-04-15 RX ORDER — PRAMIPEXOLE DIHYDROCHLORIDE 0.25 MG/1
TABLET ORAL
Qty: 270 TABLET | Refills: 1 | Status: SHIPPED | OUTPATIENT
Start: 2025-04-15

## 2025-05-07 ENCOUNTER — OFFICE VISIT (OUTPATIENT)
Dept: INTERNAL MEDICINE CLINIC | Age: 50
End: 2025-05-07
Payer: COMMERCIAL

## 2025-05-07 VITALS
WEIGHT: 257 LBS | TEMPERATURE: 97.2 F | DIASTOLIC BLOOD PRESSURE: 82 MMHG | RESPIRATION RATE: 18 BRPM | HEART RATE: 70 BPM | HEIGHT: 77 IN | OXYGEN SATURATION: 98 % | BODY MASS INDEX: 30.34 KG/M2 | SYSTOLIC BLOOD PRESSURE: 122 MMHG

## 2025-05-07 DIAGNOSIS — S29.012A STRAIN OF LATISSIMUS DORSI MUSCLE, INITIAL ENCOUNTER: ICD-10-CM

## 2025-05-07 DIAGNOSIS — J45.50 SEVERE PERSISTENT ASTHMA WITHOUT COMPLICATION (HCC): ICD-10-CM

## 2025-05-07 DIAGNOSIS — R53.83 OTHER FATIGUE: Primary | ICD-10-CM

## 2025-05-07 DIAGNOSIS — E29.1 HYPOGONADISM IN MALE: ICD-10-CM

## 2025-05-07 DIAGNOSIS — U09.9 COVID-19 LONG HAULER: ICD-10-CM

## 2025-05-07 DIAGNOSIS — M62.838 MUSCLE SPASM: ICD-10-CM

## 2025-05-07 DIAGNOSIS — F33.1 MODERATE EPISODE OF RECURRENT MAJOR DEPRESSIVE DISORDER (HCC): ICD-10-CM

## 2025-05-07 PROCEDURE — 99214 OFFICE O/P EST MOD 30 MIN: CPT | Performed by: INTERNAL MEDICINE

## 2025-05-07 PROCEDURE — G2211 COMPLEX E/M VISIT ADD ON: HCPCS | Performed by: INTERNAL MEDICINE

## 2025-05-07 RX ORDER — BUDESONIDE, GLYCOPYRROLATE, AND FORMOTEROL FUMARATE 160; 9; 4.8 UG/1; UG/1; UG/1
2 AEROSOL, METERED RESPIRATORY (INHALATION) 2 TIMES DAILY
Qty: 10.7 G | Refills: 5 | Status: SHIPPED | OUTPATIENT
Start: 2025-05-07

## 2025-05-07 ASSESSMENT — ANXIETY QUESTIONNAIRES
IF YOU CHECKED OFF ANY PROBLEMS ON THIS QUESTIONNAIRE, HOW DIFFICULT HAVE THESE PROBLEMS MADE IT FOR YOU TO DO YOUR WORK, TAKE CARE OF THINGS AT HOME, OR GET ALONG WITH OTHER PEOPLE: VERY DIFFICULT
2. NOT BEING ABLE TO STOP OR CONTROL WORRYING: SEVERAL DAYS
6. BECOMING EASILY ANNOYED OR IRRITABLE: SEVERAL DAYS
3. WORRYING TOO MUCH ABOUT DIFFERENT THINGS: SEVERAL DAYS
GAD7 TOTAL SCORE: 6
7. FEELING AFRAID AS IF SOMETHING AWFUL MIGHT HAPPEN: NOT AT ALL
1. FEELING NERVOUS, ANXIOUS, OR ON EDGE: SEVERAL DAYS
4. TROUBLE RELAXING: SEVERAL DAYS
5. BEING SO RESTLESS THAT IT IS HARD TO SIT STILL: SEVERAL DAYS

## 2025-05-07 ASSESSMENT — PATIENT HEALTH QUESTIONNAIRE - PHQ9
4. FEELING TIRED OR HAVING LITTLE ENERGY: SEVERAL DAYS
SUM OF ALL RESPONSES TO PHQ QUESTIONS 1-9: 6
SUM OF ALL RESPONSES TO PHQ QUESTIONS 1-9: 6
9. THOUGHTS THAT YOU WOULD BE BETTER OFF DEAD, OR OF HURTING YOURSELF: NOT AT ALL
1. LITTLE INTEREST OR PLEASURE IN DOING THINGS: SEVERAL DAYS
SUM OF ALL RESPONSES TO PHQ QUESTIONS 1-9: 6
5. POOR APPETITE OR OVEREATING: NOT AT ALL
10. IF YOU CHECKED OFF ANY PROBLEMS, HOW DIFFICULT HAVE THESE PROBLEMS MADE IT FOR YOU TO DO YOUR WORK, TAKE CARE OF THINGS AT HOME, OR GET ALONG WITH OTHER PEOPLE: VERY DIFFICULT
2. FEELING DOWN, DEPRESSED OR HOPELESS: SEVERAL DAYS
8. MOVING OR SPEAKING SO SLOWLY THAT OTHER PEOPLE COULD HAVE NOTICED. OR THE OPPOSITE, BEING SO FIGETY OR RESTLESS THAT YOU HAVE BEEN MOVING AROUND A LOT MORE THAN USUAL: NOT AT ALL
3. TROUBLE FALLING OR STAYING ASLEEP: SEVERAL DAYS
7. TROUBLE CONCENTRATING ON THINGS, SUCH AS READING THE NEWSPAPER OR WATCHING TELEVISION: SEVERAL DAYS
6. FEELING BAD ABOUT YOURSELF - OR THAT YOU ARE A FAILURE OR HAVE LET YOURSELF OR YOUR FAMILY DOWN: SEVERAL DAYS
SUM OF ALL RESPONSES TO PHQ QUESTIONS 1-9: 6

## 2025-05-07 ASSESSMENT — ASTHMA QUESTIONNAIRES
QUESTION_5 LAST FOUR WEEKS HOW WOULD YOU RATE YOUR ASTHMA CONTROL: WELL CONTROLLED
QUESTION_3 LAST FOUR WEEKS HOW OFTEN DID YOUR ASTHMA SYMPTOMS (WHEEZING, COUGHING, SHORTNESS OF BREATH, CHEST TIGHTNESS OR PAIN) WAKE YOU UP AT NIGHT OR EARLIER THAN USUAL IN THE MORNING: NOT AT ALL
QUESTION_1 LAST FOUR WEEKS HOW MUCH OF THE TIME DID YOUR ASTHMA KEEP YOU FROM GETTING AS MUCH DONE AT WORK, SCHOOL OR AT HOME: A LITTLE OF THE TIME
QUESTION_2 LAST FOUR WEEKS HOW OFTEN HAVE YOU HAD SHORTNESS OF BREATH: ONCE OR TWICE A WEEK
QUESTION_4 LAST FOUR WEEKS HOW OFTEN HAVE YOU USED YOUR RESCUE INHALER OR NEBULIZER MEDICATION (SUCH AS ALBUTEROL): ONCE A WEEK OR LESS
ACT_TOTALSCORE: 21

## 2025-05-07 NOTE — PROGRESS NOTES
Tomy Perez (:  1975) is a 49 y.o. male,Established patient, here for evaluation of the following chief complaint(s):  3 Month Follow-Up and Asthma         Assessment & Plan  Other fatigue    Likely sleep apnea, but will check some labs    Orders:  •  Comprehensive Metabolic Panel; Future  •  Hemoglobin A1C; Future  •  CBC with Auto Differential; Future  •  Vitamin B12 & Folate; Future  •  TSH reflex to FT4,FT3 ([place] Only); Future    Severe persistent asthma without complication (HCC)    Stable, recommend consistent use of preventative medication         Moderate episode of recurrent major depressive disorder (HCC)   Chronic, at goal (stable), stabe    Orders:  •  Budeson-Glycopyrrol-Formoterol (BREZTRI AEROSPHERE) 160-9-4.8 MCG/ACT AERO; Inhale 2 puffs into the lungs 2 times daily    COVID-19 long hauler   Chronic, at goal (stable), continue current treatment plan    Orders:  •  Budeson-Glycopyrrol-Formoterol (BREZTRI AEROSPHERE) 160-9-4.8 MCG/ACT AERO; Inhale 2 puffs into the lungs 2 times daily    Muscle spasm    worse    Orders:  •  tiZANidine (ZANAFLEX) 4 MG tablet; Take 1 tablet by mouth 3 times daily as needed (muscle pain)    Strain of latissimus dorsi muscle, initial encounter    worse    Orders:  •  tiZANidine (ZANAFLEX) 4 MG tablet; Take 1 tablet by mouth 3 times daily as needed (muscle pain)      Return in about 3 months (around 2025) for asthma 30 min.       Subjective   HPI patient comes in for follow-up of asthma.  He has been taking the Breztri intermittently.  He thinks that things are going well.  He also uses albuterol maybe twice a month.  He believes that his asthma is well-controlled.  He is starting to have some allergic rhinitis.  H which could exacerbate his asthma symptoms.  We discussed that there was a connection between sinuses and the lungs.  I recommended that he take the controller medication regularly instead of intermittently.  He was agreeable to trying

## 2025-05-08 DIAGNOSIS — R53.83 OTHER FATIGUE: ICD-10-CM

## 2025-05-08 DIAGNOSIS — E29.1 HYPOGONADISM IN MALE: ICD-10-CM

## 2025-05-09 ENCOUNTER — RESULTS FOLLOW-UP (OUTPATIENT)
Dept: INTERNAL MEDICINE CLINIC | Age: 50
End: 2025-05-09

## 2025-05-09 LAB
ALBUMIN SERPL-MCNC: 4.8 G/DL (ref 3.4–5)
ALBUMIN/GLOB SERPL: 1.8 {RATIO} (ref 1.1–2.2)
ALP SERPL-CCNC: 68 U/L (ref 40–129)
ALT SERPL-CCNC: 45 U/L (ref 10–40)
ANION GAP SERPL CALCULATED.3IONS-SCNC: 10 MMOL/L (ref 3–16)
AST SERPL-CCNC: 32 U/L (ref 15–37)
BASOPHILS # BLD: 0 K/UL (ref 0–0.2)
BASOPHILS NFR BLD: 0.5 %
BILIRUB SERPL-MCNC: 0.3 MG/DL (ref 0–1)
BUN SERPL-MCNC: 15 MG/DL (ref 7–20)
CALCIUM SERPL-MCNC: 9.6 MG/DL (ref 8.3–10.6)
CHLORIDE SERPL-SCNC: 105 MMOL/L (ref 99–110)
CO2 SERPL-SCNC: 27 MMOL/L (ref 21–32)
CREAT SERPL-MCNC: 1.3 MG/DL (ref 0.9–1.3)
DEPRECATED RDW RBC AUTO: 13.3 % (ref 12.4–15.4)
EOSINOPHIL # BLD: 0.1 K/UL (ref 0–0.6)
EOSINOPHIL NFR BLD: 1.1 %
EST. AVERAGE GLUCOSE BLD GHB EST-MCNC: 116.9 MG/DL
FOLATE SERPL-MCNC: 13.5 NG/ML (ref 4.78–24.2)
GFR SERPLBLD CREATININE-BSD FMLA CKD-EPI: 67 ML/MIN/{1.73_M2}
GLUCOSE SERPL-MCNC: 87 MG/DL (ref 70–99)
HBA1C MFR BLD: 5.7 %
HCT VFR BLD AUTO: 44.2 % (ref 40.5–52.5)
HGB BLD-MCNC: 15.3 G/DL (ref 13.5–17.5)
LYMPHOCYTES # BLD: 1.8 K/UL (ref 1–5.1)
LYMPHOCYTES NFR BLD: 34 %
MCH RBC QN AUTO: 30.2 PG (ref 26–34)
MCHC RBC AUTO-ENTMCNC: 34.7 G/DL (ref 31–36)
MCV RBC AUTO: 87.2 FL (ref 80–100)
MONOCYTES # BLD: 0.4 K/UL (ref 0–1.3)
MONOCYTES NFR BLD: 7.7 %
NEUTROPHILS # BLD: 3 K/UL (ref 1.7–7.7)
NEUTROPHILS NFR BLD: 56.7 %
PLATELET # BLD AUTO: 180 K/UL (ref 135–450)
PMV BLD AUTO: 9 FL (ref 5–10.5)
POTASSIUM SERPL-SCNC: 4.3 MMOL/L (ref 3.5–5.1)
PROT SERPL-MCNC: 7.5 G/DL (ref 6.4–8.2)
RBC # BLD AUTO: 5.07 M/UL (ref 4.2–5.9)
SODIUM SERPL-SCNC: 142 MMOL/L (ref 136–145)
TESTOST SERPL-MCNC: 215 NG/DL (ref 193–740)
TSH SERPL DL<=0.005 MIU/L-ACNC: 1.24 UIU/ML (ref 0.27–4.2)
VIT B12 SERPL-MCNC: 576 PG/ML (ref 211–911)
WBC # BLD AUTO: 5.2 K/UL (ref 4–11)

## 2025-05-21 ENCOUNTER — OFFICE VISIT (OUTPATIENT)
Dept: INTERNAL MEDICINE CLINIC | Age: 50
End: 2025-05-21
Payer: COMMERCIAL

## 2025-05-21 VITALS
DIASTOLIC BLOOD PRESSURE: 60 MMHG | HEART RATE: 77 BPM | WEIGHT: 256 LBS | SYSTOLIC BLOOD PRESSURE: 114 MMHG | OXYGEN SATURATION: 96 % | BODY MASS INDEX: 30.36 KG/M2

## 2025-05-21 DIAGNOSIS — M48.02 CERVICAL STENOSIS OF SPINAL CANAL: ICD-10-CM

## 2025-05-21 DIAGNOSIS — M77.9 INFLAMMATION AROUND JOINT: Primary | ICD-10-CM

## 2025-05-21 DIAGNOSIS — M47.812 CERVICAL SPONDYLOSIS: ICD-10-CM

## 2025-05-21 PROCEDURE — 99213 OFFICE O/P EST LOW 20 MIN: CPT | Performed by: NURSE PRACTITIONER

## 2025-05-21 RX ORDER — DICLOFENAC SODIUM 75 MG/1
75 TABLET, DELAYED RELEASE ORAL 2 TIMES DAILY
Qty: 60 TABLET | Refills: 3 | Status: SHIPPED | OUTPATIENT
Start: 2025-05-21

## 2025-05-21 RX ORDER — PREDNISONE 10 MG/1
10 TABLET ORAL 2 TIMES DAILY
Qty: 10 TABLET | Refills: 0 | Status: SHIPPED | OUTPATIENT
Start: 2025-05-21 | End: 2025-05-26

## 2025-05-21 ASSESSMENT — ENCOUNTER SYMPTOMS
EYES NEGATIVE: 1
RESPIRATORY NEGATIVE: 1

## 2025-05-21 NOTE — PATIENT INSTRUCTIONS
Cool compress to left side of neck  Take Diclofenac with food  Take prednisone  Drink at least 62 ounces of water a day
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
## 2025-05-22 ASSESSMENT — ENCOUNTER SYMPTOMS
ALLERGIC/IMMUNOLOGIC NEGATIVE: 1
GASTROINTESTINAL NEGATIVE: 1

## 2025-05-25 ENCOUNTER — TELEPHONE (OUTPATIENT)
Dept: INTERNAL MEDICINE CLINIC | Age: 50
End: 2025-05-25

## 2025-05-25 NOTE — TELEPHONE ENCOUNTER
I received disabilty papers for the patient.  I need some additional information from patient:     Which diagnosis does he want to use?      2. What are the dates that he would want for the disabilty?      3.  What restrictions does he want ( time off vs. Work restrictions)?      4.  If he is taking time off, when does he want to return to work?

## 2025-05-27 NOTE — TELEPHONE ENCOUNTER
Patient returned call with additional information. Patient says he loses strength in arms and hands and experiences pain in neck while looking up and driving for extended periods of time.    Question 4: Unable to give a date, unsure of when Surgeon will clear him to return to work. Dependant upon PT, or what is decided thereafter.

## 2025-05-27 NOTE — TELEPHONE ENCOUNTER
Pt would like to use his neck problem for the diagnosis,the date he wants for the disability starts on 05/21/2025, restrictions are no heavy lifting pt has limited movement in his neck, he is currently unsure when he will return he is doing PT for 6 weeks which started 05/06/2025 but wll see the after effect because if this does not work he may need surgery or a shot.

## 2025-05-29 ENCOUNTER — TELEPHONE (OUTPATIENT)
Dept: INTERNAL MEDICINE CLINIC | Age: 50
End: 2025-05-29

## 2025-05-29 NOTE — TELEPHONE ENCOUNTER
Patient needs to have a call regarding the disability forms.  He wants to have it read to him what Dr Najera fills out before it is faxed to [facility].  Please filled out asap     He has pain in neck still.    Please call him about the details on what needs to be on the form      Thank you

## 2025-06-01 PROBLEM — M48.02 CERVICAL STENOSIS OF SPINE: Status: ACTIVE | Noted: 2025-06-01

## 2025-06-02 NOTE — TELEPHONE ENCOUNTER
I completed the paperwork. Please call him to confirm our documentation.   Dapsone Pregnancy And Lactation Text: This medication is Pregnancy Category C and is not considered safe during pregnancy or breast feeding.

## 2025-08-18 ENCOUNTER — APPOINTMENT (OUTPATIENT)
Dept: GENERAL RADIOLOGY | Age: 50
End: 2025-08-18
Payer: COMMERCIAL

## 2025-08-18 ENCOUNTER — HOSPITAL ENCOUNTER (EMERGENCY)
Age: 50
Discharge: HOME | End: 2025-08-18
Attending: EMERGENCY MEDICINE
Payer: COMMERCIAL

## 2025-08-18 VITALS
RESPIRATION RATE: 16 BRPM | OXYGEN SATURATION: 96 % | WEIGHT: 256.4 LBS | BODY MASS INDEX: 30.4 KG/M2 | DIASTOLIC BLOOD PRESSURE: 95 MMHG | HEART RATE: 58 BPM | SYSTOLIC BLOOD PRESSURE: 163 MMHG | TEMPERATURE: 97.6 F

## 2025-08-18 DIAGNOSIS — M79.672 LEFT FOOT PAIN: ICD-10-CM

## 2025-08-18 DIAGNOSIS — M54.17 LUMBOSACRAL RADICULOPATHY: Primary | ICD-10-CM

## 2025-08-18 PROCEDURE — 6360000002 HC RX W HCPCS: Performed by: EMERGENCY MEDICINE

## 2025-08-18 PROCEDURE — 99284 EMERGENCY DEPT VISIT MOD MDM: CPT

## 2025-08-18 PROCEDURE — 72100 X-RAY EXAM L-S SPINE 2/3 VWS: CPT

## 2025-08-18 PROCEDURE — 96372 THER/PROPH/DIAG INJ SC/IM: CPT

## 2025-08-18 RX ORDER — LIDOCAINE 50 MG/G
1 PATCH TOPICAL EVERY 24 HOURS
Qty: 30 PATCH | Refills: 0 | Status: SHIPPED | OUTPATIENT
Start: 2025-08-18 | End: 2025-09-17

## 2025-08-18 RX ORDER — LIDOCAINE 4 G/G
1 PATCH TOPICAL EVERY 24 HOURS
Qty: 30 PATCH | Refills: 0 | Status: SHIPPED | OUTPATIENT
Start: 2025-08-18 | End: 2025-09-17

## 2025-08-18 RX ORDER — METHYLPREDNISOLONE 4 MG/1
TABLET ORAL
Qty: 1 KIT | Refills: 0 | Status: SHIPPED | OUTPATIENT
Start: 2025-08-18 | End: 2025-08-24

## 2025-08-18 RX ORDER — KETOROLAC TROMETHAMINE 30 MG/ML
30 INJECTION, SOLUTION INTRAMUSCULAR; INTRAVENOUS ONCE
Status: COMPLETED | OUTPATIENT
Start: 2025-08-18 | End: 2025-08-18

## 2025-08-18 RX ORDER — METHOCARBAMOL 500 MG/1
500 TABLET, FILM COATED ORAL 4 TIMES DAILY PRN
Qty: 20 TABLET | Refills: 0 | Status: SHIPPED | OUTPATIENT
Start: 2025-08-18 | End: 2025-08-23

## 2025-08-18 RX ADMIN — KETOROLAC TROMETHAMINE 30 MG: 30 INJECTION, SOLUTION INTRAMUSCULAR at 13:12

## 2025-08-18 ASSESSMENT — PAIN SCALES - GENERAL
PAINLEVEL_OUTOF10: 10
PAINLEVEL_OUTOF10: 10

## 2025-08-18 ASSESSMENT — PAIN DESCRIPTION - DESCRIPTORS: DESCRIPTORS: SHARP

## 2025-08-18 ASSESSMENT — PAIN - FUNCTIONAL ASSESSMENT
PAIN_FUNCTIONAL_ASSESSMENT: 0-10
PAIN_FUNCTIONAL_ASSESSMENT: 0-10

## 2025-08-18 ASSESSMENT — PAIN DESCRIPTION - LOCATION
LOCATION: BACK
LOCATION: BACK;LEG

## 2025-08-18 ASSESSMENT — PAIN DESCRIPTION - ORIENTATION: ORIENTATION: MID;LOWER

## 2025-08-25 ENCOUNTER — TELEPHONE (OUTPATIENT)
Dept: INTERNAL MEDICINE CLINIC | Age: 50
End: 2025-08-25

## 2025-09-03 ENCOUNTER — OFFICE VISIT (OUTPATIENT)
Dept: INTERNAL MEDICINE CLINIC | Age: 50
End: 2025-09-03

## 2025-09-03 VITALS
HEART RATE: 74 BPM | DIASTOLIC BLOOD PRESSURE: 84 MMHG | SYSTOLIC BLOOD PRESSURE: 124 MMHG | TEMPERATURE: 97.8 F | HEIGHT: 77 IN | WEIGHT: 246 LBS | RESPIRATION RATE: 18 BRPM | OXYGEN SATURATION: 98 % | BODY MASS INDEX: 29.05 KG/M2

## 2025-09-03 DIAGNOSIS — M48.02 CERVICAL STENOSIS OF SPINE: Primary | ICD-10-CM

## 2025-09-03 DIAGNOSIS — Z01.818 PRE-OP EXAM: ICD-10-CM

## 2025-09-03 DIAGNOSIS — I26.99 BILATERAL PULMONARY EMBOLISM (HCC): ICD-10-CM

## 2025-09-03 SDOH — ECONOMIC STABILITY: FOOD INSECURITY: WITHIN THE PAST 12 MONTHS, THE FOOD YOU BOUGHT JUST DIDN'T LAST AND YOU DIDN'T HAVE MONEY TO GET MORE.: NEVER TRUE

## 2025-09-03 SDOH — ECONOMIC STABILITY: FOOD INSECURITY: WITHIN THE PAST 12 MONTHS, YOU WORRIED THAT YOUR FOOD WOULD RUN OUT BEFORE YOU GOT MONEY TO BUY MORE.: NEVER TRUE

## 2025-09-03 ASSESSMENT — PATIENT HEALTH QUESTIONNAIRE - PHQ9
1. LITTLE INTEREST OR PLEASURE IN DOING THINGS: NOT AT ALL
5. POOR APPETITE OR OVEREATING: NOT AT ALL
SUM OF ALL RESPONSES TO PHQ QUESTIONS 1-9: 0
4. FEELING TIRED OR HAVING LITTLE ENERGY: NOT AT ALL
SUM OF ALL RESPONSES TO PHQ QUESTIONS 1-9: 0
3. TROUBLE FALLING OR STAYING ASLEEP: NOT AT ALL
SUM OF ALL RESPONSES TO PHQ QUESTIONS 1-9: 0
2. FEELING DOWN, DEPRESSED OR HOPELESS: NOT AT ALL
8. MOVING OR SPEAKING SO SLOWLY THAT OTHER PEOPLE COULD HAVE NOTICED. OR THE OPPOSITE, BEING SO FIGETY OR RESTLESS THAT YOU HAVE BEEN MOVING AROUND A LOT MORE THAN USUAL: NOT AT ALL
9. THOUGHTS THAT YOU WOULD BE BETTER OFF DEAD, OR OF HURTING YOURSELF: NOT AT ALL
6. FEELING BAD ABOUT YOURSELF - OR THAT YOU ARE A FAILURE OR HAVE LET YOURSELF OR YOUR FAMILY DOWN: NOT AT ALL
7. TROUBLE CONCENTRATING ON THINGS, SUCH AS READING THE NEWSPAPER OR WATCHING TELEVISION: NOT AT ALL
10. IF YOU CHECKED OFF ANY PROBLEMS, HOW DIFFICULT HAVE THESE PROBLEMS MADE IT FOR YOU TO DO YOUR WORK, TAKE CARE OF THINGS AT HOME, OR GET ALONG WITH OTHER PEOPLE: NOT DIFFICULT AT ALL
SUM OF ALL RESPONSES TO PHQ QUESTIONS 1-9: 0

## (undated) DEVICE — STERILE POLYISOPRENE POWDER-FREE SURGICAL GLOVES: Brand: PROTEXIS

## (undated) DEVICE — ROBOTIC PK

## (undated) DEVICE — CANNULA NSL AD L7FT DIV O2 CO2 W/ M LUERLOCK TRMPT CONN

## (undated) DEVICE — CANNULA SEAL

## (undated) DEVICE — 30977 SEE SHARP - ENHANCED INTRAOPERATIVE LAPAROSCOPE CLEANING & DEFOGGING: Brand: 30977 SEE SHARP - ENHANCED INTRAOPERATIVE LAPAROSCOPE CLEANING & DEFOGGING

## (undated) DEVICE — [HIGH FLOW INSUFFLATOR,  DO NOT USE IF PACKAGE IS DAMAGED,  KEEP DRY,  KEEP AWAY FROM SUNLIGHT,  PROTECT FROM HEAT AND RADIOACTIVE SOURCES.]: Brand: PNEUMOSURE

## (undated) DEVICE — SUTURE VCRL + SZ 3-0 L27IN ABSRB UD L26MM SH 1/2 CIR VCP416H

## (undated) DEVICE — ARM DRAPE

## (undated) DEVICE — ELECTRODE,RADIOTRANSLUCENT,FOAM,3PK: Brand: MEDLINE

## (undated) DEVICE — SUTURE VCRL + SZ 4-0 L18IN ABSRB UD L19MM PS-2 3/8 CIR PRIM VCP496H

## (undated) DEVICE — BINDER ABD HK  LOOP CLOSURE UNIV 30-45 IN 9 IN WHT PROCARE

## (undated) DEVICE — COVER LT HNDL BLU PLAS

## (undated) DEVICE — HYPODERMIC SAFETY NEEDLE: Brand: MAGELLAN

## (undated) DEVICE — SHEET,DRAPE,53X77,STERILE: Brand: MEDLINE

## (undated) DEVICE — BLADELESS OBTURATOR: Brand: WECK VISTA

## (undated) DEVICE — APPLICATOR MEDICATED 26 CC SOLUTION HI LT ORNG CHLORAPREP

## (undated) DEVICE — SHEET, T, LAPAROTOMY, STERILE: Brand: MEDLINE

## (undated) DEVICE — ENDOSCOPIC KIT 2 12 FT OP4 DE2 GWN SYR

## (undated) DEVICE — PAD TABLE RAMPED 1 IN TO 2 IN TRENDELENBURG

## (undated) DEVICE — MERCY FAIRFIELD TURNOVER KIT: Brand: MEDLINE INDUSTRIES, INC.

## (undated) DEVICE — SUTURE DEV SZ 2-0 WND CLSR ABSRB GS-22 VLOC COVIDIEN VLOCM2145

## (undated) DEVICE — PENCIL SMK EVAC TELSCP 3 M TBNG

## (undated) DEVICE — 3M™ IOBAN™ 2 ANTIMICROBIAL INCISE DRAPE 6650EZ: Brand: IOBAN™ 2

## (undated) DEVICE — SYRINGE MED 10ML TRNSLUC BRL PLUNG BLK MRK POLYPR CTRL

## (undated) DEVICE — BLADE ES ELASTOMERIC COAT INSUL DURABLE BEND UPTO 90DEG

## (undated) DEVICE — ADHESIVE SKIN CLSR 0.7ML TOP DERMBND ADV

## (undated) DEVICE — ELECTRODE PT RET AD L9FT HI MOIST COND ADH HYDRGEL CORDED

## (undated) DEVICE — GOWN,AURORA,NONREINF,RAGLAN,XXL,STERILE: Brand: MEDLINE

## (undated) DEVICE — SOLUTION IRRIG 1000ML 0.9% SOD CHL USP POUR PLAS BTL

## (undated) DEVICE — SUTURE ETHBND EXCEL SZ 0 L18IN NONABSORBABLE GRN L26MM MO-6 CX45D

## (undated) DEVICE — BLANKET WRM W29.9XL79.1IN UP BODY FORC AIR MISTRAL-AIR

## (undated) DEVICE — TIP COVER ACCESSORY

## (undated) DEVICE — TOTAL TRAY, DB, 100% SILI FOLEY, 16FR 10: Brand: MEDLINE